# Patient Record
Sex: FEMALE | Race: WHITE | NOT HISPANIC OR LATINO | Employment: FULL TIME | URBAN - METROPOLITAN AREA
[De-identification: names, ages, dates, MRNs, and addresses within clinical notes are randomized per-mention and may not be internally consistent; named-entity substitution may affect disease eponyms.]

---

## 2017-11-01 ENCOUNTER — HOSPITAL ENCOUNTER (OUTPATIENT)
Facility: MEDICAL CENTER | Age: 27
End: 2017-11-01
Attending: NURSE PRACTITIONER
Payer: COMMERCIAL

## 2017-11-01 ENCOUNTER — OFFICE VISIT (OUTPATIENT)
Dept: MEDICAL GROUP | Facility: MEDICAL CENTER | Age: 27
End: 2017-11-01
Payer: COMMERCIAL

## 2017-11-01 VITALS
BODY MASS INDEX: 28.75 KG/M2 | TEMPERATURE: 98.9 F | WEIGHT: 183.2 LBS | HEART RATE: 73 BPM | DIASTOLIC BLOOD PRESSURE: 80 MMHG | SYSTOLIC BLOOD PRESSURE: 118 MMHG | OXYGEN SATURATION: 97 % | HEIGHT: 67 IN

## 2017-11-01 DIAGNOSIS — Z12.4 ROUTINE CERVICAL SMEAR: ICD-10-CM

## 2017-11-01 DIAGNOSIS — E55.9 VITAMIN D DEFICIENCY: ICD-10-CM

## 2017-11-01 DIAGNOSIS — N89.8 VAGINAL DISCHARGE: ICD-10-CM

## 2017-11-01 DIAGNOSIS — Z01.419 PAP SMEAR, LOW-RISK: ICD-10-CM

## 2017-11-01 DIAGNOSIS — F41.9 ANXIETY: ICD-10-CM

## 2017-11-01 DIAGNOSIS — K59.01 SLOW TRANSIT CONSTIPATION: ICD-10-CM

## 2017-11-01 PROCEDURE — 87660 TRICHOMONAS VAGIN DIR PROBE: CPT

## 2017-11-01 PROCEDURE — 99395 PREV VISIT EST AGE 18-39: CPT | Performed by: NURSE PRACTITIONER

## 2017-11-01 PROCEDURE — 87591 N.GONORRHOEAE DNA AMP PROB: CPT

## 2017-11-01 PROCEDURE — 88175 CYTOPATH C/V AUTO FLUID REDO: CPT

## 2017-11-01 PROCEDURE — 87480 CANDIDA DNA DIR PROBE: CPT

## 2017-11-01 PROCEDURE — 87491 CHLMYD TRACH DNA AMP PROBE: CPT

## 2017-11-01 PROCEDURE — 87510 GARDNER VAG DNA DIR PROBE: CPT

## 2017-11-01 NOTE — PROGRESS NOTES
Subjective:      Lee Ann Webber is a 27 y.o. female who presents with Gynecologic Exam            HPI  Seen in f/u for pap smear.  Feeling well.   She has a hx of low vitamin d.  Not on supplement.  She had a anxiety attack last week.  Occurs rarely.  Tried a med in the past and it didn't help.  Feels that she can control w/o meds.   She is working out more.   When last seen she was having rt side pain.  All w/u neg.  Resolved with treating her constipation.   She has freq yeast infections.  So does her mother.  No sx currently.     Patient Active Problem List    Diagnosis Date Noted   • Vitamin D deficiency      Current Outpatient Prescriptions   Medication Sig Dispense Refill   • Levonorgestrel (MIRENA IU) by Intrauterine route.       No current facility-administered medications for this visit.      Allergies   Allergen Reactions   • Nkda [No Known Drug Allergy]          ROS  Review of Systems   Constitutional: Negative.  Negative for fever, chills, weight loss, malaise/fatigue and diaphoresis.   HENT: Negative.  Negative for hearing loss, ear pain, nosebleeds, congestion, sore throat, neck pain, tinnitus and ear discharge.    Eyes: Negative.  Negative for blurred vision, double vision, photophobia, pain, discharge and redness.   Respiratory: Negative.  Negative for cough, hemoptysis, sputum production, shortness of breath, wheezing and stridor.    Cardiovascular: Negative.  Negative for chest pain, palpitations, orthopnea, claudication, leg swelling and PND.   Gastrointestinal: Negative.  Negative for heartburn, nausea, vomiting, abdominal pain, diarrhea, blood in stool and melena. + chronic constipation.    Genitourinary: Negative.  Negative for dysuria, urgency, frequency, incontinence, hematuria and flank pain.   Musculoskeletal: Negative.  Negative for myalgias, back pain, joint pain and falls.   Skin: Negative.  Negative for itching and rash.   Neurological: Negative.  Negative for dizziness, tingling,  "tremors, sensory change, speech change, focal weakness, seizures, loss of consciousness, weakness and headaches.   Endo/Heme/Allergies: Negative.  Negative for environmental allergies and polydipsia. Does not bruise/bleed easily.   Psychiatric/Behavioral: Negative.  Negative for depression, suicidal ideas, hallucinations, memory loss and substance abuse. The patient does not have insomnia.    All other systems reviewed and are negative.           Objective:     /80   Pulse 73   Temp 37.2 °C (98.9 °F)   Ht 1.702 m (5' 7\")   Wt 83.1 kg (183 lb 3.2 oz)   SpO2 97%   BMI 28.69 kg/m²      Physical Exam      Physical Exam   Vitals reviewed.  Constitutional: oriented to person, place, and time. appears well-developed and well-nourished. No distress.   HENT: Head: Normocephalic and atraumatic. Bilateral tympanic membranes wnl w/o bulging.  Right Ear: External ear normal. Left Ear: External ear normal. Nose: Nose normal.  Mouth/Throat: Oropharynx is clear and moist. No oropharyngeal exudate. grace tm wnl. Eyes: Conjunctivae and EOM are normal. Pupils are equal, round, and reactive to light. Right eye exhibits no discharge. Left eye exhibits no discharge. No scleral icterus.    Neck: Normal range of motion. Neck supple. No JVD present.   Cardiovascular: Normal rate, regular rhythm, normal heart sounds and intact distal pulses.  Exam reveals no gallop and no friction rub.  No murmur heard.  No carotid bruits   Pulmonary/Chest: Effort normal and breath sounds normal. No stridor. No respiratory distress. no wheezes or rales. exhibits no tenderness.   Abdominal: Soft. Bowel sounds are normal. exhibits no distension and no mass. No tenderness. no rebound and no guarding.   Musculoskeletal: Normal range of motion. exhibits no edema or tenderness.  grace pedal pulses 2+.  Lymphadenopathy:  no cervical or supraclavicular adenopathy.   Neurological: alert and oriented to person, place, and time. has normal reflexes. displays " normal reflexes. No cranial nerve deficit. exhibits normal muscle tone. Coordination normal.   Skin: Skin is warm and dry. No rash noted. no diaphoresis. No erythema. No pallor.   Psychiatric: normal mood and affect. behavior is normal.   SUBJECTIVE: 27 y.o. female for annual routine pap and checkup.  Gyn History:   Last Pap:   Contraception: mirena  H/O Abnormal Pap none  H/O STI none  Current partner: monogamous  Lmp: 17  ROS:  Menses rarely d/t mirena.  no excessive cramping or bleeding.  No analgesics required during menses.  No pelvic pain, vaginal discharge or dyspareunia.  No breast tenderness, mass, nipple discharge, changes in size or contour, or abnormal cyclic discomfort.   Breast Exam:Performed with instruction during examination. Breasts equal size and shape.  No axillary lymphadenopathy, no skin changes, no dominant masses. No nipple retraction  Pelvic Exam - Sure Path Pap and affirm obtained and specimen sent to lab. Normal external genitalia with no lesions. Normal vaginal mucosa with normal rugation. Cervix has no visible lesions. No cervical motion tenderness. Uterus is normal sized with no masses. No adnexal tenderness or enlargement appreciated.  Mod amt white cottage cheese dg noted                      Assessment/Plan:     1. Pap smear, low-risk  THINPREP RFLX HPV ASCUS W/CTNG    f/u with pt with test results and yearly.  planning on trying to get pg in .  will f/u with gyn for dc IUD and start prenatal mvi   2. Routine cervical smear  THINPREP RFLX HPV ASCUS W/CTNG   3. Vaginal discharge  VAGINAL PATHOGENS DNA PANEL    affirm done.  hx of recurrent yeast infection   4. Vitamin D deficiency      take d3 2000 units daily   5. Anxiety      occas sx.  no tx needed   6. Slow transit constipation      controlled with otc products     7.  F/u yearly

## 2017-11-02 ENCOUNTER — TELEPHONE (OUTPATIENT)
Dept: MEDICAL GROUP | Facility: MEDICAL CENTER | Age: 27
End: 2017-11-02

## 2017-11-02 DIAGNOSIS — B37.9 CANDIDA INFECTION: ICD-10-CM

## 2017-11-02 DIAGNOSIS — N76.0 GARDNERELLA VAGINALIS INFECTION: ICD-10-CM

## 2017-11-02 DIAGNOSIS — B96.89 GARDNERELLA VAGINALIS INFECTION: ICD-10-CM

## 2017-11-02 LAB
C TRACH DNA GENITAL QL NAA+PROBE: NEGATIVE
CANDIDA DNA VAG QL PROBE+SIG AMP: POSITIVE
CYTOLOGY REG CYTOL: NORMAL
G VAGINALIS DNA VAG QL PROBE+SIG AMP: POSITIVE
N GONORRHOEA DNA GENITAL QL NAA+PROBE: NEGATIVE
SPECIMEN SOURCE: NORMAL
T VAGINALIS DNA VAG QL PROBE+SIG AMP: NEGATIVE

## 2017-11-02 RX ORDER — METRONIDAZOLE 500 MG/1
500 TABLET ORAL 3 TIMES DAILY
Qty: 30 TAB | Refills: 0 | Status: SHIPPED | OUTPATIENT
Start: 2017-11-02 | End: 2018-01-17

## 2017-11-03 NOTE — TELEPHONE ENCOUNTER
Please let pt know that the affirm shows both yeast infection and gardnerella.  i will send in 1 week of flagyl.  That should treat both issues.

## 2017-11-06 ENCOUNTER — TELEPHONE (OUTPATIENT)
Dept: MEDICAL GROUP | Facility: MEDICAL CENTER | Age: 27
End: 2017-11-06

## 2017-11-06 DIAGNOSIS — R87.619 ABNORMAL CERVICAL PAPANICOLAOU SMEAR, UNSPECIFIED ABNORMAL PAP FINDING: ICD-10-CM

## 2017-11-06 NOTE — TELEPHONE ENCOUNTER
----- Message from Tyler Macario M.D. sent at 11/4/2017  2:21 PM PDT -----  Please notify meg's patient that her Pap smear does show some abnormal cells, I would recommend that she follow-up with a gynecologist.  Let us know if she needs a referral  She does also have evidence of a yeast infection and bacterial infection.  We will need to send antibiotics to her pharmacy.  If she is in agreement let me know

## 2018-01-17 ENCOUNTER — OFFICE VISIT (OUTPATIENT)
Dept: MEDICAL GROUP | Facility: CLINIC | Age: 28
End: 2018-01-17
Payer: COMMERCIAL

## 2018-01-17 VITALS
RESPIRATION RATE: 16 BRPM | SYSTOLIC BLOOD PRESSURE: 128 MMHG | HEART RATE: 62 BPM | TEMPERATURE: 97.9 F | DIASTOLIC BLOOD PRESSURE: 64 MMHG | OXYGEN SATURATION: 96 %

## 2018-01-17 DIAGNOSIS — J02.9 SORE THROAT: ICD-10-CM

## 2018-01-17 DIAGNOSIS — J40 BRONCHITIS: ICD-10-CM

## 2018-01-17 LAB
INT CON NEG: NEGATIVE
INT CON POS: POSITIVE
S PYO AG THROAT QL: NEGATIVE

## 2018-01-17 PROCEDURE — 87880 STREP A ASSAY W/OPTIC: CPT | Performed by: NURSE PRACTITIONER

## 2018-01-17 PROCEDURE — 99212 OFFICE O/P EST SF 10 MIN: CPT | Performed by: NURSE PRACTITIONER

## 2018-01-17 RX ORDER — CODEINE PHOSPHATE AND GUAIFENESIN 10; 100 MG/5ML; MG/5ML
5-10 SOLUTION ORAL NIGHTLY PRN
Qty: 100 ML | Refills: 0 | Status: SHIPPED | OUTPATIENT
Start: 2018-01-17 | End: 2018-01-27

## 2018-01-17 ASSESSMENT — ENCOUNTER SYMPTOMS
SINUS PAIN: 0
SORE THROAT: 1
FEVER: 0
SHORTNESS OF BREATH: 0
COUGH: 1
CHILLS: 0
SPUTUM PRODUCTION: 0
WHEEZING: 0
MYALGIAS: 0

## 2018-01-17 NOTE — PROGRESS NOTES
Chief Complaint   Patient presents with   • URI     dry cough/ congestion x 4 days        HISTORY OF PRESENT ILLNESS: Patient is a 27 y.o. female established patient who presents today due to 4 days hx of dry coughing but chest congested. Pt starts with sore throat and she feels that has resolved. However, she is taking OTC cold medicine and not sure if that has tylenol in there or not. Coughing is worsening at night time and she is unable to sleep.     Pt denied fever, chills, no trouble breathing, wheezing, no trouble swallowing. No earache or sinus pressure. No muscle soreness.    Non smoker. No known hx of lung disease or immuno compromised.    Patient Active Problem List    Diagnosis Date Noted   • Vitamin D deficiency        Allergies:Nkda [no known drug allergy]    Current Outpatient Prescriptions   Medication Sig Dispense Refill   • guaifenesin-codeine (ROBITUSSIN AC) Solution oral solution Take 5-10 mL by mouth at bedtime as needed for up to 10 days. 100 mL 0   • Levonorgestrel (MIRENA IU) by Intrauterine route.       No current facility-administered medications for this visit.        Social History   Substance Use Topics   • Smoking status: Never Smoker   • Smokeless tobacco: Never Used   • Alcohol use 0.6 oz/week     1 Glasses of wine per week       Family Status   Relation Status   • Mother Alive   • Father Alive   • Maternal Grandmother Alive   • Maternal Grandfather Alive   • Paternal Grandmother    • Paternal Grandfather      Family History   Problem Relation Age of Onset   • Thyroid Mother    • Cancer Father      skin   • Cancer Maternal Grandmother      breast   • Heart Disease Maternal Grandfather 70     mi   • Cancer Paternal Grandmother      lung in smoker         ROS:  Review of Systems   Constitutional: Negative for chills and fever.   HENT: Positive for congestion and sore throat (mild). Negative for sinus pain.    Respiratory: Positive for cough. Negative for sputum  production, shortness of breath and wheezing.    Musculoskeletal: Negative for myalgias.        Objective:     Blood pressure 128/64, pulse 62, temperature 36.6 °C (97.9 °F), resp. rate 16, SpO2 96 %.     Physical Exam:  Physical Exam   Constitutional: She is oriented to person, place, and time and well-developed, well-nourished, and in no distress.   HENT:   Head: Normocephalic and atraumatic.   Mouth/Throat: Posterior oropharyngeal edema (tonsil is a little bit swelling and red but no drainage or white patches) and posterior oropharyngeal erythema present. No oropharyngeal exudate or tonsillar abscesses.   Eyes: Conjunctivae are normal.   Neck: Neck supple. No JVD present.   Cardiovascular: Normal rate and regular rhythm.    Pulmonary/Chest: Effort normal and breath sounds normal. No respiratory distress. She has no wheezes. She has no rales.   Musculoskeletal: Normal range of motion. She exhibits no edema.   Neurological: She is alert and oriented to person, place, and time.   Skin: Skin is warm. No erythema.   Vitals reviewed.        Assessment/Plan:  Less likely strep throat but cannot totally exclude out without doing strep test since pt might have two things/infections going on at the same time.     1. Sore throat  - POCT Rapid Strep A: negative    2. Bronchitis  Most likely virus, supportive care for now. Pt to call us back on Friday if she is still not feeling better. Will consider to cover with abx.   - guaifenesin-codeine (ROBITUSSIN AC) Solution oral solution; Take 5-10 mL by mouth at bedtime as needed for up to 10 days.  Dispense: 100 mL; Refill: 0    Differential diagnoses and indications for immediate follow-up discussed with patient.    Instructed to return to clinic or nearest emergency department for any change in condition, further concerns, or worsening of symptoms.    The patient demonstrated a good understanding and agreed with the treatment plan.

## 2018-01-18 NOTE — PATIENT INSTRUCTIONS

## 2018-01-19 ENCOUNTER — TELEPHONE (OUTPATIENT)
Dept: MEDICAL GROUP | Facility: CLINIC | Age: 28
End: 2018-01-19

## 2018-01-19 DIAGNOSIS — J40 BRONCHITIS: ICD-10-CM

## 2018-01-19 RX ORDER — AZITHROMYCIN 250 MG/1
TABLET, FILM COATED ORAL
Qty: 6 TAB | Refills: 0 | Status: SHIPPED | OUTPATIENT
Start: 2018-01-19 | End: 2019-08-21

## 2018-01-19 NOTE — TELEPHONE ENCOUNTER
1. Caller Name: Lee Ann Webber                      Call Back Number: [phone] (home)     2. Message: pt called she states not getting better wants to get antibiotics, please send to [facility] on [address] thank you     3. Patient approves office to leave a detailed MyChart message: yes

## 2018-02-04 ENCOUNTER — TELEPHONE (OUTPATIENT)
Dept: MEDICAL GROUP | Facility: MEDICAL CENTER | Age: 28
End: 2018-02-04

## 2018-02-05 NOTE — TELEPHONE ENCOUNTER
Gave pt info. Pt states she has not made apt with gyn. Pt thinks she was only referred due to an infection that she states is under control.

## 2018-02-05 NOTE — TELEPHONE ENCOUNTER
She did have an infection so that is ok however the pap smear was very abn.  i am  Not sure that it was d/t the infection.  She needs to see gyn.

## 2018-04-04 ENCOUNTER — GYNECOLOGY VISIT (OUTPATIENT)
Dept: OBGYN | Facility: MEDICAL CENTER | Age: 28
End: 2018-04-04
Payer: COMMERCIAL

## 2018-04-04 VITALS
WEIGHT: 180 LBS | HEIGHT: 67 IN | BODY MASS INDEX: 28.25 KG/M2 | SYSTOLIC BLOOD PRESSURE: 120 MMHG | DIASTOLIC BLOOD PRESSURE: 80 MMHG

## 2018-04-04 DIAGNOSIS — R87.613 HGSIL ON PAP SMEAR OF CERVIX: ICD-10-CM

## 2018-04-04 DIAGNOSIS — L70.9 ACNE, UNSPECIFIED ACNE TYPE: ICD-10-CM

## 2018-04-04 DIAGNOSIS — E28.8 HYPERANDROGENISM: ICD-10-CM

## 2018-04-04 PROCEDURE — 99203 OFFICE O/P NEW LOW 30 MIN: CPT | Performed by: OBSTETRICS & GYNECOLOGY

## 2018-04-05 ENCOUNTER — TELEPHONE (OUTPATIENT)
Dept: OBGYN | Facility: MEDICAL CENTER | Age: 28
End: 2018-04-05

## 2018-04-05 DIAGNOSIS — R87.613 HGSIL (HIGH GRADE SQUAMOUS INTRAEPITHELIAL LESION) ON PAP SMEAR OF CERVIX: ICD-10-CM

## 2018-04-05 NOTE — PROGRESS NOTES
"Subjective:      Lee Ann Webber is a 28 y.o. female who presents with New Patient (Abnormal pap/HGSIL REF BY PCP)        CC: abnormal pap smear    HPI: 29 yo  using Mirena IUD for contraception presents for consultation regarding abnormal pap smear.  Pap returned HGSIL on .  No hx of previous cervical dysplasia, only \"infections on pap smear.  Using Mirena for contraception, no menses.  Thinking about trying to conceive by IVF, because she want to choose the gender.  She has lost 50 lbs by working out twice a day.  She complains of severe cystic acne which started 6 months ago.  Denies abnormal hair growth.      ROS      Objective:     /80   Ht 1.702 m (5' 7\")   Wt 81.6 kg (180 lb)   Breastfeeding? No   BMI 28.19 kg/m²      Physical Exam  GENERAL: Alert, in no apparent distress  PSYCHIATRIC: Appropriate affect, intact insight and judgement.  NECK:  Nontender, no masses.  No Thyromegaly or nodules. No lymphadenopathy.  RESPIRATORY: Normal respiratory effort.  Lungs clear to auscultation.   CARDIOVASCULAR: RRR, no murmur, gallop, or rub.  ABDOMEN: Soft, nontender, nondistended.  No palpable masses.  No rebound or guarding.  No inguinal lymphadenopathy.  No hepatosplenomegaly.  No hernias.  BACK: No CVA tenderness  EXTREMITIES: No edema  SKIN: No rash. Severe cystic acne on face.       Pap smear  - HGSIL     Assessment/Plan:     1. HGSIL on Pap smear of cervix  Discussed need for colposcopy to r/o cervical cancer, and natural progression of dysplasia to cervical cancer.  She will likely need treatment with LEEP procedure. Referral placed for colposcopy.    2. Acne, unspecified acne type  Offered trial of benzoyl peroxide or cleocin solution.  She declines, requests hormones to be checked.   - TESTOSTERONE F&T FEMALES/CHILD; Future  - DHEA SULFATE; Future  - DHEA; Future  - FSH; Future  - LUTEINIZING HORMONE SERUM (LH); Future    3. Hyperandrogenism  Severe acne, but no hirusuitism. No signs " of insulin resistance with recent weight loss.   - TESTOSTERONE F&T FEMALES/CHILD; Future  - DHEA SULFATE; Future  - DHEA; Future  - FSH; Future  - LUTEINIZING HORMONE SERUM (LH); Future    4. Preconception counseling - recommend folic acid supplementation and removal of IUD one month before starting IVF process.    F/U for colposcopy next available

## 2018-04-05 NOTE — TELEPHONE ENCOUNTER
----- Message from Keyshawn Mukherjee sent at 4/5/2018  9:26 AM PDT -----  Regarding: Referral Request  Contact: [phone]  Pt saw Dr. Schaeffer yesterday. Requested a referral to Dr. Maria.    Called pt back, she would like a referral to Dr Maria's office for a second option in regards to her Colposcopy/LEEP.  Pt states that she already has an apt scheduled but she does need the referral to be placed. Informed pt that referral will be placed today.     Referral placed today in [facility].     Informed Dr Schaeffer about pt request, she does not feel that referral need to be placed. But since pt is requesting referral order has been placed.

## 2018-04-12 ENCOUNTER — HOSPITAL ENCOUNTER (OUTPATIENT)
Dept: LAB | Facility: MEDICAL CENTER | Age: 28
End: 2018-04-12
Attending: OBSTETRICS & GYNECOLOGY
Payer: COMMERCIAL

## 2018-04-12 DIAGNOSIS — E28.8 HYPERANDROGENISM: ICD-10-CM

## 2018-04-12 DIAGNOSIS — L70.9 ACNE, UNSPECIFIED ACNE TYPE: ICD-10-CM

## 2018-04-12 LAB
DHEA-S SERPL-MCNC: 195 UG/DL (ref 98.8–340)
FSH SERPL-ACNC: 2.2 MIU/ML
LH SERPL-ACNC: 7 IU/L

## 2018-04-12 PROCEDURE — 84403 ASSAY OF TOTAL TESTOSTERONE: CPT

## 2018-04-12 PROCEDURE — 36415 COLL VENOUS BLD VENIPUNCTURE: CPT

## 2018-04-12 PROCEDURE — 83002 ASSAY OF GONADOTROPIN (LH): CPT

## 2018-04-12 PROCEDURE — 82626 DEHYDROEPIANDROSTERONE: CPT

## 2018-04-12 PROCEDURE — 83001 ASSAY OF GONADOTROPIN (FSH): CPT

## 2018-04-12 PROCEDURE — 84270 ASSAY OF SEX HORMONE GLOBUL: CPT

## 2018-04-12 PROCEDURE — 82627 DEHYDROEPIANDROSTERONE: CPT

## 2018-04-15 LAB
DHEA SERPL-MCNC: 4.55 NG/ML (ref 1.33–7.78)
SHBG SERPL-SCNC: 42 NMOL/L (ref 30–135)
TESTOST FREE SERPL-MCNC: 4.2 PG/ML (ref 0.8–7.4)
TESTOST SERPL-MCNC: 29 NG/DL (ref 9–55)

## 2018-05-15 ENCOUNTER — OFFICE VISIT (OUTPATIENT)
Dept: URGENT CARE | Facility: CLINIC | Age: 28
End: 2018-05-15
Payer: COMMERCIAL

## 2018-05-15 VITALS
HEART RATE: 94 BPM | RESPIRATION RATE: 16 BRPM | OXYGEN SATURATION: 99 % | HEIGHT: 67 IN | DIASTOLIC BLOOD PRESSURE: 62 MMHG | BODY MASS INDEX: 28.3 KG/M2 | TEMPERATURE: 98.7 F | WEIGHT: 180.34 LBS | SYSTOLIC BLOOD PRESSURE: 128 MMHG

## 2018-05-15 DIAGNOSIS — R10.32 LLQ PAIN: ICD-10-CM

## 2018-05-15 LAB
APPEARANCE UR: NORMAL
BILIRUB UR STRIP-MCNC: NEGATIVE MG/DL
COLOR UR AUTO: YELLOW
GLUCOSE UR STRIP.AUTO-MCNC: NEGATIVE MG/DL
INT CON NEG: NORMAL
INT CON POS: NORMAL
KETONES UR STRIP.AUTO-MCNC: NEGATIVE MG/DL
LEUKOCYTE ESTERASE UR QL STRIP.AUTO: NORMAL
NITRITE UR QL STRIP.AUTO: NEGATIVE
PH UR STRIP.AUTO: 7.5 [PH] (ref 5–8)
POC URINE PREGNANCY TEST: NEGATIVE
PROT UR QL STRIP: 30 MG/DL
RBC UR QL AUTO: NORMAL
SP GR UR STRIP.AUTO: 1.01
UROBILINOGEN UR STRIP-MCNC: NEGATIVE MG/DL

## 2018-05-15 PROCEDURE — 81002 URINALYSIS NONAUTO W/O SCOPE: CPT | Performed by: FAMILY MEDICINE

## 2018-05-15 PROCEDURE — 81025 URINE PREGNANCY TEST: CPT | Performed by: FAMILY MEDICINE

## 2018-05-15 PROCEDURE — 99214 OFFICE O/P EST MOD 30 MIN: CPT | Performed by: FAMILY MEDICINE

## 2018-05-15 RX ORDER — IBUPROFEN 800 MG/1
800 TABLET ORAL EVERY 8 HOURS PRN
Qty: 30 TAB | Refills: 0 | Status: SHIPPED | OUTPATIENT
Start: 2018-05-15 | End: 2019-08-21

## 2018-05-15 NOTE — PROGRESS NOTES
Subjective:     Chief Complaint   Patient presents with   • LLQ Pain     2 years on and off                 Abdominal Pain  This is a new problem. The current episode started 2 YRS AGO.   She has been evaluated extensively for this in the past by multiple specialists - Gyn, GI and she stopped seeing them last year since all testing was negative and she was fustrated that no underlying cause was found.    Pain significantly worse today - sharp, stabbing, RLQ pain.   Nothing seems to make the pain better or worse.   She has hx of  chronic constipation, but normal BMs lately.         Pertinent negatives include no belching, constipation, diarrhea, dysuria, fever, hematochezia, hematuria, nausea or sore throat.          Social History   Substance Use Topics   • Smoking status: Never Smoker   • Smokeless tobacco: Never Used   • Alcohol use 0.6 oz/week     1 Glasses of wine per week           Current Outpatient Prescriptions on File Prior to Visit   Medication Sig Dispense Refill   • Levonorgestrel (MIRENA IU) by Intrauterine route.     • azithromycin (ZITHROMAX) 250 MG Tab As directed 6 Tab 0     No current facility-administered medications on file prior to visit.        Family History   Problem Relation Age of Onset   • Thyroid Mother    • Cancer Father      skin   • Cancer Maternal Grandmother      breast   • Heart Disease Maternal Grandfather 70     mi   • Cancer Paternal Grandmother      lung in smoker         Allergies   Allergen Reactions   • Nkda [No Known Drug Allergy]          Review of Systems   Constitutional: Negative for fever.   HENT: Negative for sore throat.    Gastrointestinal: Positive for abdominal pain. Negative for nausea, diarrhea, constipation and hematochezia.   Genitourinary: Negative for dysuria and hematuria.   Neurological: denies dizziness, confusion, disorientation.   No extremity weakness or numbness  All other systems reviewed and are negative.         Objective:     Blood pressure 128/62,  "pulse 94, temperature 37.1 °C (98.7 °F), resp. rate 16, height 1.702 m (5' 7\"), weight 81.8 kg (180 lb 5.4 oz), SpO2 99 %, not currently breastfeeding.      Physical Exam   Constitutional: pt appears well-developed. No distress.   HENT:   Nose: No nasal discharge.   Mouth/Throat: Mucous membranes are moist. Oropharynx is clear.   Eyes: Conjunctivae and EOM are normal. Pupils are equal, round, and reactive to light. Right eye exhibits no discharge. Left eye exhibits no discharge.   Neck: Neck supple.   Cardiovascular: Normal rate, regular rhythm, S1 normal and S2 normal.    Pulmonary/Chest: Effort normal and breath sounds normal. There is normal air entry. No respiratory distress.   Abdominal: Soft. There is tenderness in the RLQ area. bowel sounds are present.   No liver or spleen enlargement .  No rebound and no guarding.   There is no pain over McBurney's point  Lymphadenopathy:     Pt has no  adenopathy.   Neurological: pt is alert and orientated x3 . No cranial nerve deficit.   Skin: Skin is warm and moist. No petechiae and no rash noted.   not diaphoretic. No jaundice.   Nursing note and vitals reviewed.         Lab Results   Component Value Date/Time    POCCOLOR yellow 05/15/2018 04:40 PM    POCAPPEAR cloudy 05/15/2018 04:40 PM    POCLEUKEST moderate 05/15/2018 04:40 PM    POCNITRITE negative 05/15/2018 04:40 PM    POCUROBILIGE negative 05/15/2018 04:40 PM    POCPROTEIN 30 05/15/2018 04:40 PM    POCURPH 7.5 05/15/2018 04:40 PM    POCBLOOD trace 05/15/2018 04:40 PM    POCSPGRV 1.010 05/15/2018 04:40 PM    POCKETONES negative 05/15/2018 04:40 PM    POCBILIRUBIN negative 05/15/2018 04:40 PM    POCGLUCUA negative 05/15/2018 04:40 PM      Urine HCG negative.      Assessment/Plan:          1. LLQ pain  UA was significant for LE, trace blood - will send for culture, but given duration of her pain, and lack of urinary symptoms, I don't think that this represents UTI, but will send for cx      CT abd/pelvis ordered. "         - ibuprofen (MOTRIN) 800 MG Tab; Take 1 Tab by mouth every 8 hours as needed.  Dispense: 30 Tab; Refill: 0  - CT-ABDOMEN-PELVIS WITH; Future  - POCT Pregnancy  - POCT Urinalysis

## 2019-08-21 ENCOUNTER — OFFICE VISIT (OUTPATIENT)
Dept: MEDICAL GROUP | Facility: MEDICAL CENTER | Age: 29
End: 2019-08-21
Payer: COMMERCIAL

## 2019-08-21 VITALS
HEART RATE: 89 BPM | SYSTOLIC BLOOD PRESSURE: 110 MMHG | BODY MASS INDEX: 29.19 KG/M2 | TEMPERATURE: 98 F | HEIGHT: 67 IN | DIASTOLIC BLOOD PRESSURE: 80 MMHG | OXYGEN SATURATION: 99 % | WEIGHT: 186 LBS

## 2019-08-21 DIAGNOSIS — R19.4 CHANGE IN BOWEL HABIT: ICD-10-CM

## 2019-08-21 DIAGNOSIS — T78.40XA ALLERGIC REACTION, INITIAL ENCOUNTER: ICD-10-CM

## 2019-08-21 DIAGNOSIS — R43.2 ALTERED TASTE: ICD-10-CM

## 2019-08-21 DIAGNOSIS — R19.4 BOWEL HABIT CHANGES: ICD-10-CM

## 2019-08-21 DIAGNOSIS — Z91.018 FOOD ALLERGY: ICD-10-CM

## 2019-08-21 DIAGNOSIS — K92.1 HEMATOCHEZIA: ICD-10-CM

## 2019-08-21 DIAGNOSIS — Z13.220 SCREENING FOR HYPERLIPIDEMIA: ICD-10-CM

## 2019-08-21 DIAGNOSIS — R63.5 WEIGHT GAIN: ICD-10-CM

## 2019-08-21 DIAGNOSIS — E55.9 VITAMIN D DEFICIENCY: ICD-10-CM

## 2019-08-21 PROCEDURE — 99214 OFFICE O/P EST MOD 30 MIN: CPT | Performed by: NURSE PRACTITIONER

## 2019-08-21 RX ORDER — EPINEPHRINE 0.3 MG/.3ML
0.3 INJECTION SUBCUTANEOUS ONCE
Qty: 0.3 ML | Refills: 0 | Status: SHIPPED | OUTPATIENT
Start: 2019-08-21 | End: 2019-08-21

## 2019-08-21 ASSESSMENT — PATIENT HEALTH QUESTIONNAIRE - PHQ9: CLINICAL INTERPRETATION OF PHQ2 SCORE: 0

## 2019-08-21 NOTE — PROGRESS NOTES
Subjective:     Lee Ann Webber is a 29 y.o. female who presents with change in taste.    HPI:   Seen in f/u for change in taste buds.  She doesn't eat meat.  Over the last several weeks she has noted nausea and diarrhea with chicken and fish both.  She will immediately get sick.  In the past red meat has always made her sick.  She will get angioedema wiht eating onions.  It is getting worse.  Sudden onset of taste change.  She will having bloating with eating na.  When she eats sugar she will have heart racing.  No FH of DM.    She has chronic diarrhea on mg++. i f she doesn't take mg+ she will have constipation.  She is also having occas black tarry stools.  No FH of crohns and ulcerative.  Continues to have LLQ pain with constipation.  No colonoscopy done.    She has been exercising regularly.  She is eating very healthy.  Still can't loose weight.  Her last lab showed sl low vitamin d.  She is on d otc daily.       Patient Active Problem List    Diagnosis Date Noted   • Altered taste 08/21/2019   • Weight gain finding 08/21/2019   • Food allergy 08/21/2019   • Bowel habit changes 08/21/2019   • Hematochezia 08/21/2019   • Vitamin D deficiency        Current medicines (including changes today)  Current Outpatient Medications   Medication Sig Dispense Refill   • EPINEPHrine (EPIPEN 2-AMADOR) 0.3 MG/0.3ML Solution Auto-injector solution for injection 0.3 mL by Intramuscular route Once for 1 dose. 0.3 mL 0   • Levonorgestrel (MIRENA IU) by Intrauterine route.       No current facility-administered medications for this visit.        Allergies   Allergen Reactions   • Nkda [No Known Drug Allergy]        ROS  Constitutional: Negative. Negative for fever, chills, weight loss, malaise/fatigue and diaphoresis.   HENT: Negative. Negative for hearing loss, ear pain, nosebleeds, congestion, sore throat, neck pain, tinnitus and ear discharge.   Respiratory: Negative. Negative for cough, hemoptysis, sputum production, shortness  "of breath, wheezing and stridor.   Cardiovascular: Negative. Negative for chest pain, palpitations, orthopnea, claudication, leg swelling and PND.   Gastrointestinal: Denies nausea, vomiting, diarrhea, constipation, heartburn, melena or hematochezia.  Genitourinary: Denies dysuria, hematuria, urinary incontinence, frequency or urgency.        Objective:     /80 (BP Location: Right arm, Patient Position: Sitting)   Pulse 89   Temp 36.7 °C (98 °F) (Temporal)   Ht 1.702 m (5' 7\")   Wt 84.4 kg (186 lb)   SpO2 99%  Body mass index is 29.13 kg/m².    Physical Exam:  Vitals reviewed.  Constitutional: Oriented to person, place, and time. appears well-developed and well-nourished. No distress.   Cardiovascular: Normal rate, regular rhythm, normal heart sounds and intact distal pulses. Exam reveals no gallop and no friction rub. No murmur heard. No carotid bruits.   Pulmonary/Chest: Effort normal and breath sounds normal. No stridor. No respiratory distress. no wheezes or rales. exhibits no tenderness.   Musculoskeletal: Normal range of motion. exhibits no edema. garce pedal pulses 2+.  Lymphadenopathy: No cervical or supraclavicular adenopathy.   Neurological: Alert and oriented to person, place, and time. exhibits normal muscle tone.  Skin: Skin is warm and dry. No diaphoresis.   Psychiatric: Normal mood and affect. Behavior is normal.      Assessment and Plan:     The following treatment plan was discussed:    1. Change in bowel habit  CBC WITH DIFFERENTIAL    Comp Metabolic Panel    HEMOGLOBIN A1C    REFERRAL TO GASTROENTEROLOGY    new onset with change in taste.  refer gi and allergy for angioedema sx.     2. Allergic reaction, initial encounter  EPINEPHrine (EPIPEN 2-AMADOR) 0.3 MG/0.3ML Solution Auto-injector solution for injection    REFERRAL TO ALLERGY   3. Weight gain  Comp Metabolic Panel    HEMOGLOBIN A1C    TSH    FREE THYROXINE    Eating healthy but cannot loose wt.  Check thyroid lab.    4. Vitamin D " deficiency  VITAMIN D,25 HYDROXY    Taking otc supplement.  Last d was mildly dec.  Check lab and f/u for review 1 mo     5. Screening for hyperlipidemia  Lipid Profile   6. Altered taste  CBC WITH DIFFERENTIAL    Comp Metabolic Panel    HEMOGLOBIN A1C    VITAMIN B12    FOLATE    WESTERGREN SED RATE    HELENA REFLEXIVE PROFILE    New with sudden onset.  Angioedema sx after eating onions.  Refer allergy.  Do lab and f/u 1 mo for review     7. Food allergy      Angioedema sx.  Refer allergy.  Epipen for pt use if sx reoccur   8. Bowel habit changes      Refer GI.  Having n/v with foods.  Black tarry stools.  Diarrhea and constipation   9. Hematochezia      Having diarrhea and constipation.  Also bloating after eating na.  Some black tarry stools.   refer GI for eval         Followup: Return in about 4 weeks (around 9/18/2019).

## 2019-09-06 ENCOUNTER — HOSPITAL ENCOUNTER (OUTPATIENT)
Dept: LAB | Facility: MEDICAL CENTER | Age: 29
End: 2019-09-06
Attending: NURSE PRACTITIONER
Payer: COMMERCIAL

## 2019-09-06 DIAGNOSIS — R63.5 WEIGHT GAIN: ICD-10-CM

## 2019-09-06 DIAGNOSIS — R43.2 ALTERED TASTE: ICD-10-CM

## 2019-09-06 DIAGNOSIS — Z13.220 SCREENING FOR HYPERLIPIDEMIA: ICD-10-CM

## 2019-09-06 DIAGNOSIS — R19.4 CHANGE IN BOWEL HABIT: ICD-10-CM

## 2019-09-06 DIAGNOSIS — E55.9 VITAMIN D DEFICIENCY: ICD-10-CM

## 2019-09-06 LAB
25(OH)D3 SERPL-MCNC: 46 NG/ML (ref 30–100)
ALBUMIN SERPL BCP-MCNC: 4.3 G/DL (ref 3.2–4.9)
ALBUMIN/GLOB SERPL: 1.7 G/DL
ALP SERPL-CCNC: 36 U/L (ref 30–99)
ALT SERPL-CCNC: 20 U/L (ref 2–50)
ANION GAP SERPL CALC-SCNC: 10 MMOL/L (ref 0–11.9)
AST SERPL-CCNC: 24 U/L (ref 12–45)
BASOPHILS # BLD AUTO: 0.9 % (ref 0–1.8)
BASOPHILS # BLD: 0.05 K/UL (ref 0–0.12)
BILIRUB SERPL-MCNC: 0.5 MG/DL (ref 0.1–1.5)
BUN SERPL-MCNC: 12 MG/DL (ref 8–22)
CALCIUM SERPL-MCNC: 9.7 MG/DL (ref 8.5–10.5)
CHLORIDE SERPL-SCNC: 104 MMOL/L (ref 96–112)
CHOLEST SERPL-MCNC: 170 MG/DL (ref 100–199)
CO2 SERPL-SCNC: 24 MMOL/L (ref 20–33)
CREAT SERPL-MCNC: 0.75 MG/DL (ref 0.5–1.4)
EOSINOPHIL # BLD AUTO: 0.1 K/UL (ref 0–0.51)
EOSINOPHIL NFR BLD: 1.8 % (ref 0–6.9)
ERYTHROCYTE [DISTWIDTH] IN BLOOD BY AUTOMATED COUNT: 44.6 FL (ref 35.9–50)
ERYTHROCYTE [SEDIMENTATION RATE] IN BLOOD BY WESTERGREN METHOD: 3 MM/HOUR (ref 0–20)
FOLATE SERPL-MCNC: 15.2 NG/ML
GLOBULIN SER CALC-MCNC: 2.6 G/DL (ref 1.9–3.5)
GLUCOSE SERPL-MCNC: 82 MG/DL (ref 65–99)
HCT VFR BLD AUTO: 43.3 % (ref 37–47)
HDLC SERPL-MCNC: 51 MG/DL
HGB BLD-MCNC: 13.9 G/DL (ref 12–16)
IMM GRANULOCYTES # BLD AUTO: 0.02 K/UL (ref 0–0.11)
IMM GRANULOCYTES NFR BLD AUTO: 0.4 % (ref 0–0.9)
LDLC SERPL CALC-MCNC: 106 MG/DL
LYMPHOCYTES # BLD AUTO: 2.16 K/UL (ref 1–4.8)
LYMPHOCYTES NFR BLD: 38.9 % (ref 22–41)
MCH RBC QN AUTO: 28.5 PG (ref 27–33)
MCHC RBC AUTO-ENTMCNC: 32.1 G/DL (ref 33.6–35)
MCV RBC AUTO: 88.7 FL (ref 81.4–97.8)
MONOCYTES # BLD AUTO: 0.43 K/UL (ref 0–0.85)
MONOCYTES NFR BLD AUTO: 7.7 % (ref 0–13.4)
NEUTROPHILS # BLD AUTO: 2.79 K/UL (ref 2–7.15)
NEUTROPHILS NFR BLD: 50.3 % (ref 44–72)
NRBC # BLD AUTO: 0 K/UL
NRBC BLD-RTO: 0 /100 WBC
PLATELET # BLD AUTO: 233 K/UL (ref 164–446)
PMV BLD AUTO: 10 FL (ref 9–12.9)
POTASSIUM SERPL-SCNC: 4.2 MMOL/L (ref 3.6–5.5)
PROT SERPL-MCNC: 6.9 G/DL (ref 6–8.2)
RBC # BLD AUTO: 4.88 M/UL (ref 4.2–5.4)
SODIUM SERPL-SCNC: 138 MMOL/L (ref 135–145)
T4 FREE SERPL-MCNC: 0.87 NG/DL (ref 0.53–1.43)
TRIGL SERPL-MCNC: 63 MG/DL (ref 0–149)
TSH SERPL DL<=0.005 MIU/L-ACNC: 2.11 UIU/ML (ref 0.38–5.33)
VIT B12 SERPL-MCNC: 866 PG/ML (ref 211–911)
WBC # BLD AUTO: 5.6 K/UL (ref 4.8–10.8)

## 2019-09-06 PROCEDURE — 82306 VITAMIN D 25 HYDROXY: CPT

## 2019-09-06 PROCEDURE — 85025 COMPLETE CBC W/AUTO DIFF WBC: CPT

## 2019-09-06 PROCEDURE — 84439 ASSAY OF FREE THYROXINE: CPT

## 2019-09-06 PROCEDURE — 84443 ASSAY THYROID STIM HORMONE: CPT

## 2019-09-06 PROCEDURE — 82746 ASSAY OF FOLIC ACID SERUM: CPT

## 2019-09-06 PROCEDURE — 80053 COMPREHEN METABOLIC PANEL: CPT

## 2019-09-06 PROCEDURE — 82607 VITAMIN B-12: CPT

## 2019-09-06 PROCEDURE — 85652 RBC SED RATE AUTOMATED: CPT

## 2019-09-06 PROCEDURE — 83036 HEMOGLOBIN GLYCOSYLATED A1C: CPT

## 2019-09-06 PROCEDURE — 80061 LIPID PANEL: CPT

## 2019-09-06 PROCEDURE — 36415 COLL VENOUS BLD VENIPUNCTURE: CPT

## 2019-09-06 PROCEDURE — 86039 ANTINUCLEAR ANTIBODIES (ANA): CPT

## 2019-09-06 PROCEDURE — 86038 ANTINUCLEAR ANTIBODIES: CPT

## 2019-09-07 LAB
EST. AVERAGE GLUCOSE BLD GHB EST-MCNC: 100 MG/DL
HBA1C MFR BLD: 5.1 % (ref 0–5.6)
NUCLEAR IGG SER QL IA: DETECTED

## 2019-09-08 LAB
ANA INTERPRETIVE COMMENT Q5143: NORMAL
ANTINUCLEAR ANTIBODY (ANA), HEP-2, IGG Q5142: NORMAL

## 2019-09-18 ENCOUNTER — OFFICE VISIT (OUTPATIENT)
Dept: MEDICAL GROUP | Facility: MEDICAL CENTER | Age: 29
End: 2019-09-18
Payer: COMMERCIAL

## 2019-09-18 VITALS
HEIGHT: 67 IN | TEMPERATURE: 97.4 F | HEART RATE: 67 BPM | OXYGEN SATURATION: 99 % | BODY MASS INDEX: 29.19 KG/M2 | WEIGHT: 186 LBS | SYSTOLIC BLOOD PRESSURE: 102 MMHG | DIASTOLIC BLOOD PRESSURE: 62 MMHG

## 2019-09-18 DIAGNOSIS — K58.8 OTHER IRRITABLE BOWEL SYNDROME: ICD-10-CM

## 2019-09-18 DIAGNOSIS — M54.40 CHRONIC BILATERAL LOW BACK PAIN WITH SCIATICA, SCIATICA LATERALITY UNSPECIFIED: ICD-10-CM

## 2019-09-18 DIAGNOSIS — R76.8 POSITIVE ANA (ANTINUCLEAR ANTIBODY): ICD-10-CM

## 2019-09-18 DIAGNOSIS — G89.29 CHRONIC BILATERAL LOW BACK PAIN WITH SCIATICA, SCIATICA LATERALITY UNSPECIFIED: ICD-10-CM

## 2019-09-18 PROCEDURE — 99214 OFFICE O/P EST MOD 30 MIN: CPT | Performed by: NURSE PRACTITIONER

## 2019-09-18 RX ORDER — IBUPROFEN 800 MG/1
800 TABLET ORAL EVERY 8 HOURS PRN
Qty: 20 TAB | Refills: 3 | Status: SHIPPED | OUTPATIENT
Start: 2019-09-18 | End: 2020-06-11

## 2019-10-22 ENCOUNTER — HOSPITAL ENCOUNTER (OUTPATIENT)
Dept: LAB | Facility: MEDICAL CENTER | Age: 29
End: 2019-10-22
Attending: NURSE PRACTITIONER
Payer: COMMERCIAL

## 2019-10-22 DIAGNOSIS — R76.8 POSITIVE ANA (ANTINUCLEAR ANTIBODY): ICD-10-CM

## 2019-10-22 LAB — RHEUMATOID FACT SER IA-ACNC: <10 IU/ML (ref 0–14)

## 2019-10-22 PROCEDURE — 86431 RHEUMATOID FACTOR QUANT: CPT

## 2019-10-22 PROCEDURE — 36415 COLL VENOUS BLD VENIPUNCTURE: CPT

## 2019-10-22 PROCEDURE — 86200 CCP ANTIBODY: CPT

## 2019-10-24 ENCOUNTER — TELEPHONE (OUTPATIENT)
Dept: MEDICAL GROUP | Facility: MEDICAL CENTER | Age: 29
End: 2019-10-24

## 2019-10-24 LAB — CCP IGG SERPL-ACNC: 5 UNITS (ref 0–19)

## 2020-03-11 ENCOUNTER — HOSPITAL ENCOUNTER (OUTPATIENT)
Dept: LAB | Facility: MEDICAL CENTER | Age: 30
End: 2020-03-11
Attending: OBSTETRICS & GYNECOLOGY
Payer: COMMERCIAL

## 2020-03-11 LAB
HBV SURFACE AB SERPL IA-ACNC: 352 MIU/ML (ref 0–10)
HBV SURFACE AG SER QL: NORMAL
HCV AB SER QL: NORMAL
HIV 1+2 AB+HIV1 P24 AG SERPL QL IA: NORMAL
TREPONEMA PALLIDUM IGG+IGM AB [PRESENCE] IN SERUM OR PLASMA BY IMMUNOASSAY: NORMAL

## 2020-03-11 PROCEDURE — 86780 TREPONEMA PALLIDUM: CPT

## 2020-03-11 PROCEDURE — 87389 HIV-1 AG W/HIV-1&-2 AB AG IA: CPT

## 2020-03-11 PROCEDURE — 86803 HEPATITIS C AB TEST: CPT

## 2020-03-11 PROCEDURE — 87340 HEPATITIS B SURFACE AG IA: CPT

## 2020-03-11 PROCEDURE — 86706 HEP B SURFACE ANTIBODY: CPT

## 2020-03-11 PROCEDURE — 36415 COLL VENOUS BLD VENIPUNCTURE: CPT

## 2020-03-26 ENCOUNTER — HOSPITAL ENCOUNTER (OUTPATIENT)
Facility: MEDICAL CENTER | Age: 30
End: 2020-03-26
Attending: OBSTETRICS & GYNECOLOGY
Payer: COMMERCIAL

## 2020-03-26 PROCEDURE — 87491 CHLMYD TRACH DNA AMP PROBE: CPT

## 2020-03-26 PROCEDURE — 87591 N.GONORRHOEAE DNA AMP PROB: CPT

## 2020-03-27 LAB
C TRACH DNA SPEC QL NAA+PROBE: NEGATIVE
N GONORRHOEA DNA SPEC QL NAA+PROBE: NEGATIVE
SPECIMEN SOURCE: NORMAL

## 2020-06-11 DIAGNOSIS — G89.29 CHRONIC BILATERAL LOW BACK PAIN WITH SCIATICA, SCIATICA LATERALITY UNSPECIFIED: ICD-10-CM

## 2020-06-11 DIAGNOSIS — M54.40 CHRONIC BILATERAL LOW BACK PAIN WITH SCIATICA, SCIATICA LATERALITY UNSPECIFIED: ICD-10-CM

## 2020-06-11 RX ORDER — IBUPROFEN 800 MG/1
TABLET ORAL
Qty: 20 TAB | Refills: 3 | Status: ON HOLD | OUTPATIENT
Start: 2020-06-11 | End: 2021-01-02 | Stop reason: CLARIF

## 2020-06-23 ENCOUNTER — HOSPITAL ENCOUNTER (OUTPATIENT)
Dept: LAB | Facility: MEDICAL CENTER | Age: 30
End: 2020-06-23
Attending: OBSTETRICS & GYNECOLOGY
Payer: COMMERCIAL

## 2020-06-23 LAB
PROLACTIN SERPL-MCNC: 18.3 NG/ML (ref 2.8–26)
TSH SERPL DL<=0.005 MIU/L-ACNC: 1.71 UIU/ML (ref 0.38–5.33)

## 2020-06-23 PROCEDURE — 84146 ASSAY OF PROLACTIN: CPT

## 2020-06-23 PROCEDURE — 84443 ASSAY THYROID STIM HORMONE: CPT

## 2020-07-13 ENCOUNTER — HOSPITAL ENCOUNTER (OUTPATIENT)
Dept: LAB | Facility: MEDICAL CENTER | Age: 30
End: 2020-07-13
Attending: OBSTETRICS & GYNECOLOGY
Payer: COMMERCIAL

## 2020-07-13 LAB
ESTRADIOL SERPL-MCNC: 730 PG/ML
PROGEST SERPL-MCNC: 44.9 NG/ML

## 2020-07-13 PROCEDURE — 82670 ASSAY OF TOTAL ESTRADIOL: CPT

## 2020-07-13 PROCEDURE — 84144 ASSAY OF PROGESTERONE: CPT

## 2020-07-13 PROCEDURE — 36415 COLL VENOUS BLD VENIPUNCTURE: CPT

## 2020-07-16 ENCOUNTER — HOSPITAL ENCOUNTER (OUTPATIENT)
Dept: LAB | Facility: MEDICAL CENTER | Age: 30
End: 2020-07-16
Attending: OBSTETRICS & GYNECOLOGY
Payer: COMMERCIAL

## 2020-07-16 LAB
B-HCG SERPL-ACNC: 187 MIU/ML (ref 0–5)
ESTRADIOL SERPL-MCNC: 913 PG/ML
PROGEST SERPL-MCNC: 39 NG/ML

## 2020-07-16 PROCEDURE — 84702 CHORIONIC GONADOTROPIN TEST: CPT

## 2020-07-16 PROCEDURE — 84144 ASSAY OF PROGESTERONE: CPT

## 2020-07-16 PROCEDURE — 36415 COLL VENOUS BLD VENIPUNCTURE: CPT

## 2020-07-16 PROCEDURE — 87086 URINE CULTURE/COLONY COUNT: CPT

## 2020-07-16 PROCEDURE — 82670 ASSAY OF TOTAL ESTRADIOL: CPT

## 2020-07-18 LAB
BACTERIA UR CULT: NORMAL
SIGNIFICANT IND 70042: NORMAL
SITE SITE: NORMAL
SOURCE SOURCE: NORMAL

## 2020-07-20 ENCOUNTER — HOSPITAL ENCOUNTER (OUTPATIENT)
Dept: LAB | Facility: MEDICAL CENTER | Age: 30
End: 2020-07-20
Attending: OBSTETRICS & GYNECOLOGY
Payer: COMMERCIAL

## 2020-07-20 LAB
B-HCG SERPL-ACNC: 1647 MIU/ML (ref 0–5)
ESTRADIOL SERPL-MCNC: 1083 PG/ML
PROGEST SERPL-MCNC: 46.1 NG/ML

## 2020-07-20 PROCEDURE — 84144 ASSAY OF PROGESTERONE: CPT

## 2020-07-20 PROCEDURE — 36415 COLL VENOUS BLD VENIPUNCTURE: CPT

## 2020-07-20 PROCEDURE — 82670 ASSAY OF TOTAL ESTRADIOL: CPT

## 2020-07-20 PROCEDURE — 84702 CHORIONIC GONADOTROPIN TEST: CPT

## 2020-09-24 ENCOUNTER — HOSPITAL ENCOUNTER (OUTPATIENT)
Dept: LAB | Facility: MEDICAL CENTER | Age: 30
End: 2020-09-24
Attending: OBSTETRICS & GYNECOLOGY
Payer: COMMERCIAL

## 2020-09-24 LAB
ABO GROUP BLD: NORMAL
BASOPHILS # BLD AUTO: 0.2 % (ref 0–1.8)
BASOPHILS # BLD: 0.02 K/UL (ref 0–0.12)
BLD GP AB SCN SERPL QL: NORMAL
EOSINOPHIL # BLD AUTO: 0.06 K/UL (ref 0–0.51)
EOSINOPHIL NFR BLD: 0.6 % (ref 0–6.9)
ERYTHROCYTE [DISTWIDTH] IN BLOOD BY AUTOMATED COUNT: 44.4 FL (ref 35.9–50)
HBV SURFACE AG SER QL: ABNORMAL
HCT VFR BLD AUTO: 41.6 % (ref 37–47)
HCV AB SER QL: NORMAL
HGB BLD-MCNC: 14 G/DL (ref 12–16)
HIV 1+2 AB+HIV1 P24 AG SERPL QL IA: NORMAL
IMM GRANULOCYTES # BLD AUTO: 0.04 K/UL (ref 0–0.11)
IMM GRANULOCYTES NFR BLD AUTO: 0.4 % (ref 0–0.9)
LYMPHOCYTES # BLD AUTO: 1.62 K/UL (ref 1–4.8)
LYMPHOCYTES NFR BLD: 14.9 % (ref 22–41)
MCH RBC QN AUTO: 30.1 PG (ref 27–33)
MCHC RBC AUTO-ENTMCNC: 33.7 G/DL (ref 33.6–35)
MCV RBC AUTO: 89.5 FL (ref 81.4–97.8)
MONOCYTES # BLD AUTO: 0.5 K/UL (ref 0–0.85)
MONOCYTES NFR BLD AUTO: 4.6 % (ref 0–13.4)
NEUTROPHILS # BLD AUTO: 8.66 K/UL (ref 2–7.15)
NEUTROPHILS NFR BLD: 79.3 % (ref 44–72)
NRBC # BLD AUTO: 0 K/UL
NRBC BLD-RTO: 0 /100 WBC
PLATELET # BLD AUTO: 228 K/UL (ref 164–446)
PMV BLD AUTO: 10.7 FL (ref 9–12.9)
RBC # BLD AUTO: 4.65 M/UL (ref 4.2–5.4)
RH BLD: NORMAL
RUBV AB SER QL: 364 IU/ML
TREPONEMA PALLIDUM IGG+IGM AB [PRESENCE] IN SERUM OR PLASMA BY IMMUNOASSAY: ABNORMAL
WBC # BLD AUTO: 10.9 K/UL (ref 4.8–10.8)

## 2020-09-24 PROCEDURE — 87086 URINE CULTURE/COLONY COUNT: CPT

## 2020-09-24 PROCEDURE — 36415 COLL VENOUS BLD VENIPUNCTURE: CPT

## 2020-09-24 PROCEDURE — 86780 TREPONEMA PALLIDUM: CPT

## 2020-09-24 PROCEDURE — 86787 VARICELLA-ZOSTER ANTIBODY: CPT

## 2020-09-24 PROCEDURE — 87389 HIV-1 AG W/HIV-1&-2 AB AG IA: CPT

## 2020-09-24 PROCEDURE — 86900 BLOOD TYPING SEROLOGIC ABO: CPT

## 2020-09-24 PROCEDURE — 87340 HEPATITIS B SURFACE AG IA: CPT

## 2020-09-24 PROCEDURE — 86901 BLOOD TYPING SEROLOGIC RH(D): CPT

## 2020-09-24 PROCEDURE — 85025 COMPLETE CBC W/AUTO DIFF WBC: CPT

## 2020-09-24 PROCEDURE — 86762 RUBELLA ANTIBODY: CPT

## 2020-09-24 PROCEDURE — 86850 RBC ANTIBODY SCREEN: CPT

## 2020-09-24 PROCEDURE — 86803 HEPATITIS C AB TEST: CPT

## 2020-09-25 LAB — VZV IGG SER IA-ACNC: 0.75

## 2020-09-26 LAB
BACTERIA UR CULT: NORMAL
SIGNIFICANT IND 70042: NORMAL
SITE SITE: NORMAL
SOURCE SOURCE: NORMAL

## 2021-01-02 ENCOUNTER — HOSPITAL ENCOUNTER (OUTPATIENT)
Facility: MEDICAL CENTER | Age: 31
End: 2021-01-02
Attending: OBSTETRICS & GYNECOLOGY | Admitting: OBSTETRICS & GYNECOLOGY
Payer: COMMERCIAL

## 2021-01-02 ENCOUNTER — APPOINTMENT (OUTPATIENT)
Dept: OBGYN | Facility: MEDICAL CENTER | Age: 31
End: 2021-01-02
Attending: OBSTETRICS & GYNECOLOGY
Payer: COMMERCIAL

## 2021-01-02 VITALS
TEMPERATURE: 98.5 F | RESPIRATION RATE: 16 BRPM | HEIGHT: 67 IN | OXYGEN SATURATION: 96 % | DIASTOLIC BLOOD PRESSURE: 59 MMHG | BODY MASS INDEX: 28.88 KG/M2 | SYSTOLIC BLOOD PRESSURE: 105 MMHG | WEIGHT: 184 LBS | HEART RATE: 74 BPM

## 2021-01-02 PROCEDURE — 59025 FETAL NON-STRESS TEST: CPT

## 2021-01-02 ASSESSMENT — FIBROSIS 4 INDEX: FIB4 SCORE: 0.66

## 2021-01-02 NOTE — PROGRESS NOTES
30 y.o. G1 @ 28.2 IVF, velamentous cord insertion with Shortened cervix here to LDA1 for scheduled NST. EFM & Dalzell applied. VSS. Reactive NSt obtained. Report to Dr. Tucker, strip reviewed, Discharge order received. Discharge teaching performed. Discharged to home, ambulatory.

## 2021-01-03 NOTE — PROGRESS NOTES
DATE OF SERVICE:  2021     HISTORY OF PRESENT ILLNESS:  This is a 30-year-old  2, para 0 at 28   weeks and 2 days, who presented to the hospital for routine fetal   surveillance.  This pregnancy has been complicated by in vitro fertilization,   fetal growth restriction, known velamentous cord insertion.  There was   initially concern for possible diaphragmatic hernia or abnormal fetal   echocardiogram; however, both of these issues have been ruled out at this   point.  Fetal surveillance shows reactive 140s and reassuring tracing for   gestational age with no decelerations present.  Tocometer shows occasional   irritability.  Overall, fetal surveillance is reassuring for gestational age   and she will be discharged to home for followup in the office.        ______________________________  MD BOB MARROQUIN/MANISH/HERMAN    DD:  2021 11:24  DT:  2021 11:51    Job#:  [id number]

## 2021-01-22 ENCOUNTER — HOSPITAL ENCOUNTER (OUTPATIENT)
Dept: HOSPITAL 8 - RAD | Age: 31
Discharge: HOME | End: 2021-01-22
Attending: OBSTETRICS & GYNECOLOGY
Payer: COMMERCIAL

## 2021-01-22 DIAGNOSIS — N13.30: ICD-10-CM

## 2021-01-22 DIAGNOSIS — O99.891: ICD-10-CM

## 2021-01-22 DIAGNOSIS — J98.4: ICD-10-CM

## 2021-01-22 DIAGNOSIS — O28.3: Primary | ICD-10-CM

## 2021-01-22 DIAGNOSIS — K44.9: ICD-10-CM

## 2021-01-22 PROCEDURE — 72195 MRI PELVIS W/O DYE: CPT

## 2021-03-12 NOTE — H&P
DATE OF ADMISSION:  2021     HISTORY OF PRESENT ILLNESS:  This is a 30-year-old G1, P0 whose estimated date   of delivery is 2021.  At that point, she will be 39 weeks.  She is   scheduled for a primary  delivery secondary to breech presentation and   congenital diaphragmatic hernia.  The patient is currently without   complaints.  She reports good fetal movement. No contractions, vaginal   bleeding or loss of fluid.     REVIEW OF SYSTEMS:  Negative for nausea, vomiting, chest pain, shortness of   breath or loss of taste or smell.     PAST MEDICAL HISTORY:  None.     PAST SURGICAL HISTORY:  None.     MEDICATIONS:  Vitamin D and prenatal vitamins.     ALLERGIES:  No known drug allergies.     SOCIAL HISTORY:  The patient denies tobacco, ethanol or drugs.     OBSTETRICAL HISTORY:  This is her first pregnancy.  She conceived via IVF.    Pregnancy again is complicated by congenital diaphragmatic hernia, breech   presentation.     PHYSICAL EXAMINATION:    GENERAL:  She is a well-developed, well-nourished female in no apparent   distress.  VITAL SIGNS:  Weight is 191 pounds. Blood pressure 122/78.  HEART:  Regular rate and rhythm.  LUNGS:  Clear to auscultation bilaterally.  ABDOMEN:  Gravid and soft.  EXTREMITIES:  Show no clubbing, cyanosis or edema.  PELVIC:  Fetal heart tones are documented in the 130s.     PERTINENT PRENATAL LABORATORY DATA:  Her blood type is O positive, antibody   screen negative, RPR nonreactive, rubella immune, HIV negative, hepatitis B   negative.  One-hour Glucola normal.  Group B strep test is negative.     ASSESSMENT AND PLAN:  A 30-year-old G1, P0 with intrauterine gestation at 39   weeks.  1.  Fetus is in breech presentation.  The patient will require  for   that.  2.  Pregnancy is complicated by congenital diaphragmatic hernia.  The patient   has seen Dr. Shaylee Lux who is aware that this patient is scheduled for   surgery on this day.  She will be evaluating  the baby after delivery.  3.  Shortened cervix throughout pregnancy.  4.  Velamentous cord insertion.  All the patient's preimplantation genetics   were normal.  The patient will proceed with  as scheduled.  We   discussed the risks of surgery, which can include infection; bleeding; scar;   damage to bowel, bladder, ureters; emergency blood transfusion; emergency   hysterectomy.  The patient voiced understanding of the risks as described and   agrees to proceed as scheduled.        ______________________________  MD SCOOBY Rajput/VIVEK/HERMAN    DD:  2021 12:41  DT:  2021 13:36    Job#:  [id number]

## 2021-03-15 ENCOUNTER — HOSPITAL ENCOUNTER (OUTPATIENT)
Dept: OBGYN | Facility: MEDICAL CENTER | Age: 31
End: 2021-03-15
Attending: OBSTETRICS & GYNECOLOGY
Payer: COMMERCIAL

## 2021-03-15 LAB
SARS-COV-2 RNA RESP QL NAA+PROBE: NOTDETECTED
SPECIMEN SOURCE: NORMAL

## 2021-03-15 PROCEDURE — U0003 INFECTIOUS AGENT DETECTION BY NUCLEIC ACID (DNA OR RNA); SEVERE ACUTE RESPIRATORY SYNDROME CORONAVIRUS 2 (SARS-COV-2) (CORONAVIRUS DISEASE [COVID-19]), AMPLIFIED PROBE TECHNIQUE, MAKING USE OF HIGH THROUGHPUT TECHNOLOGIES AS DESCRIBED BY CMS-2020-01-R: HCPCS

## 2021-03-15 PROCEDURE — U0005 INFEC AGEN DETEC AMPLI PROBE: HCPCS

## 2021-03-15 NOTE — PROGRESS NOTES
Patient comes in for covid screening prior to scheduled c/s on 3/18.  Self isolation instructions given and reviewed.

## 2021-03-17 ENCOUNTER — PRE-ADMISSION TESTING (OUTPATIENT)
Dept: ADMISSIONS | Facility: MEDICAL CENTER | Age: 31
End: 2021-03-17
Attending: OBSTETRICS & GYNECOLOGY
Payer: COMMERCIAL

## 2021-03-17 ENCOUNTER — ANESTHESIA EVENT (OUTPATIENT)
Dept: OBGYN | Facility: MEDICAL CENTER | Age: 31
End: 2021-03-17
Payer: COMMERCIAL

## 2021-03-17 RX ORDER — ASPIRIN 81 MG/1
81 TABLET, CHEWABLE ORAL DAILY
Status: ON HOLD | COMMUNITY
End: 2021-03-19

## 2021-03-17 RX ORDER — MAGNESIUM GLUCONATE 27 MG(500)
500 TABLET ORAL DAILY
COMMUNITY
End: 2022-07-27

## 2021-03-18 ENCOUNTER — ANESTHESIA (OUTPATIENT)
Dept: OBGYN | Facility: MEDICAL CENTER | Age: 31
End: 2021-03-18
Payer: COMMERCIAL

## 2021-03-18 ENCOUNTER — HOSPITAL ENCOUNTER (INPATIENT)
Facility: MEDICAL CENTER | Age: 31
LOS: 1 days | End: 2021-03-19
Attending: OBSTETRICS & GYNECOLOGY | Admitting: OBSTETRICS & GYNECOLOGY
Payer: COMMERCIAL

## 2021-03-18 DIAGNOSIS — G89.18 POSTOPERATIVE PAIN: ICD-10-CM

## 2021-03-18 LAB
BASOPHILS # BLD AUTO: 0.2 % (ref 0–1.8)
BASOPHILS # BLD: 0.02 K/UL (ref 0–0.12)
BLD GP AB INVEST PLASRBC-IMP: ABNORMAL
BLD GP AB SCN SERPL QL: ABNORMAL
EOSINOPHIL # BLD AUTO: 0.04 K/UL (ref 0–0.51)
EOSINOPHIL NFR BLD: 0.4 % (ref 0–6.9)
ERYTHROCYTE [DISTWIDTH] IN BLOOD BY AUTOMATED COUNT: 41.3 FL (ref 35.9–50)
ERYTHROCYTE [DISTWIDTH] IN BLOOD BY AUTOMATED COUNT: 42.9 FL (ref 35.9–50)
HCT VFR BLD AUTO: 30.7 % (ref 37–47)
HCT VFR BLD AUTO: 31.5 % (ref 37–47)
HGB BLD-MCNC: 10.4 G/DL (ref 12–16)
HGB BLD-MCNC: 10.4 G/DL (ref 12–16)
HOLDING TUBE BB 8507: NORMAL
IMM GRANULOCYTES # BLD AUTO: 0.1 K/UL (ref 0–0.11)
IMM GRANULOCYTES NFR BLD AUTO: 1.1 % (ref 0–0.9)
LYMPHOCYTES # BLD AUTO: 1.43 K/UL (ref 1–4.8)
LYMPHOCYTES NFR BLD: 15.8 % (ref 22–41)
MCH RBC QN AUTO: 29.2 PG (ref 27–33)
MCH RBC QN AUTO: 29.5 PG (ref 27–33)
MCHC RBC AUTO-ENTMCNC: 33 G/DL (ref 33.6–35)
MCHC RBC AUTO-ENTMCNC: 33.9 G/DL (ref 33.6–35)
MCV RBC AUTO: 87.2 FL (ref 81.4–97.8)
MCV RBC AUTO: 88.5 FL (ref 81.4–97.8)
MONOCYTES # BLD AUTO: 0.75 K/UL (ref 0–0.85)
MONOCYTES NFR BLD AUTO: 8.3 % (ref 0–13.4)
NEUTROPHILS # BLD AUTO: 6.73 K/UL (ref 2–7.15)
NEUTROPHILS NFR BLD: 74.2 % (ref 44–72)
NRBC # BLD AUTO: 0 K/UL
NRBC BLD-RTO: 0 /100 WBC
PLATELET # BLD AUTO: 170 K/UL (ref 164–446)
PLATELET # BLD AUTO: 95 K/UL (ref 164–446)
PMV BLD AUTO: 10.4 FL (ref 9–12.9)
PMV BLD AUTO: 11.2 FL (ref 9–12.9)
RBC # BLD AUTO: 3.52 M/UL (ref 4.2–5.4)
RBC # BLD AUTO: 3.56 M/UL (ref 4.2–5.4)
WBC # BLD AUTO: 16.8 K/UL (ref 4.8–10.8)
WBC # BLD AUTO: 9.1 K/UL (ref 4.8–10.8)

## 2021-03-18 PROCEDURE — 700111 HCHG RX REV CODE 636 W/ 250 OVERRIDE (IP): Performed by: ANESTHESIOLOGY

## 2021-03-18 PROCEDURE — 160041 HCHG SURGERY MINUTES - EA ADDL 1 MIN LEVEL 4: Performed by: OBSTETRICS & GYNECOLOGY

## 2021-03-18 PROCEDURE — 160035 HCHG PACU - 1ST 60 MINS PHASE I: Performed by: OBSTETRICS & GYNECOLOGY

## 2021-03-18 PROCEDURE — 85025 COMPLETE CBC W/AUTO DIFF WBC: CPT

## 2021-03-18 PROCEDURE — 86870 RBC ANTIBODY IDENTIFICATION: CPT

## 2021-03-18 PROCEDURE — 700102 HCHG RX REV CODE 250 W/ 637 OVERRIDE(OP)

## 2021-03-18 PROCEDURE — A9270 NON-COVERED ITEM OR SERVICE: HCPCS

## 2021-03-18 PROCEDURE — A9270 NON-COVERED ITEM OR SERVICE: HCPCS | Performed by: OBSTETRICS & GYNECOLOGY

## 2021-03-18 PROCEDURE — 160029 HCHG SURGERY MINUTES - 1ST 30 MINS LEVEL 4: Performed by: OBSTETRICS & GYNECOLOGY

## 2021-03-18 PROCEDURE — 36415 COLL VENOUS BLD VENIPUNCTURE: CPT

## 2021-03-18 PROCEDURE — A9270 NON-COVERED ITEM OR SERVICE: HCPCS | Performed by: ANESTHESIOLOGY

## 2021-03-18 PROCEDURE — 85027 COMPLETE CBC AUTOMATED: CPT

## 2021-03-18 PROCEDURE — 160002 HCHG RECOVERY MINUTES (STAT): Performed by: OBSTETRICS & GYNECOLOGY

## 2021-03-18 PROCEDURE — 700105 HCHG RX REV CODE 258: Performed by: ANESTHESIOLOGY

## 2021-03-18 PROCEDURE — 160009 HCHG ANES TIME/MIN: Performed by: OBSTETRICS & GYNECOLOGY

## 2021-03-18 PROCEDURE — 86850 RBC ANTIBODY SCREEN: CPT

## 2021-03-18 PROCEDURE — 160048 HCHG OR STATISTICAL LEVEL 1-5: Performed by: OBSTETRICS & GYNECOLOGY

## 2021-03-18 PROCEDURE — 700111 HCHG RX REV CODE 636 W/ 250 OVERRIDE (IP)

## 2021-03-18 PROCEDURE — 700102 HCHG RX REV CODE 250 W/ 637 OVERRIDE(OP): Performed by: ANESTHESIOLOGY

## 2021-03-18 PROCEDURE — 770002 HCHG ROOM/CARE - OB PRIVATE (112)

## 2021-03-18 PROCEDURE — 700101 HCHG RX REV CODE 250: Performed by: ANESTHESIOLOGY

## 2021-03-18 PROCEDURE — 700102 HCHG RX REV CODE 250 W/ 637 OVERRIDE(OP): Performed by: OBSTETRICS & GYNECOLOGY

## 2021-03-18 PROCEDURE — 700111 HCHG RX REV CODE 636 W/ 250 OVERRIDE (IP): Performed by: OBSTETRICS & GYNECOLOGY

## 2021-03-18 RX ORDER — ONDANSETRON 2 MG/ML
4 INJECTION INTRAMUSCULAR; INTRAVENOUS EVERY 6 HOURS PRN
Status: DISCONTINUED | OUTPATIENT
Start: 2021-03-18 | End: 2021-03-19

## 2021-03-18 RX ORDER — SODIUM CHLORIDE, SODIUM GLUCONATE, SODIUM ACETATE, POTASSIUM CHLORIDE AND MAGNESIUM CHLORIDE 526; 502; 368; 37; 30 MG/100ML; MG/100ML; MG/100ML; MG/100ML; MG/100ML
INJECTION, SOLUTION INTRAVENOUS
Status: DISCONTINUED | OUTPATIENT
Start: 2021-03-18 | End: 2021-03-18 | Stop reason: SURG

## 2021-03-18 RX ORDER — METOCLOPRAMIDE HYDROCHLORIDE 5 MG/ML
10 INJECTION INTRAMUSCULAR; INTRAVENOUS ONCE
Status: COMPLETED | OUTPATIENT
Start: 2021-03-18 | End: 2021-03-18

## 2021-03-18 RX ORDER — MISOPROSTOL 200 UG/1
600 TABLET ORAL
Status: DISCONTINUED | OUTPATIENT
Start: 2021-03-18 | End: 2021-03-19 | Stop reason: HOSPADM

## 2021-03-18 RX ORDER — DOCUSATE SODIUM 100 MG/1
100 CAPSULE, LIQUID FILLED ORAL 2 TIMES DAILY PRN
Status: DISCONTINUED | OUTPATIENT
Start: 2021-03-18 | End: 2021-03-19 | Stop reason: HOSPADM

## 2021-03-18 RX ORDER — DIPHENHYDRAMINE HYDROCHLORIDE 50 MG/ML
12.5 INJECTION INTRAMUSCULAR; INTRAVENOUS EVERY 6 HOURS PRN
Status: DISCONTINUED | OUTPATIENT
Start: 2021-03-18 | End: 2021-03-19

## 2021-03-18 RX ORDER — ONDANSETRON 2 MG/ML
4 INJECTION INTRAMUSCULAR; INTRAVENOUS
Status: DISCONTINUED | OUTPATIENT
Start: 2021-03-18 | End: 2021-03-18 | Stop reason: HOSPADM

## 2021-03-18 RX ORDER — HALOPERIDOL 5 MG/ML
1 INJECTION INTRAMUSCULAR
Status: DISCONTINUED | OUTPATIENT
Start: 2021-03-18 | End: 2021-03-18 | Stop reason: HOSPADM

## 2021-03-18 RX ORDER — SODIUM CHLORIDE, SODIUM GLUCONATE, SODIUM ACETATE, POTASSIUM CHLORIDE AND MAGNESIUM CHLORIDE 526; 502; 368; 37; 30 MG/100ML; MG/100ML; MG/100ML; MG/100ML; MG/100ML
1500 INJECTION, SOLUTION INTRAVENOUS ONCE
Status: COMPLETED | OUTPATIENT
Start: 2021-03-18 | End: 2021-03-18

## 2021-03-18 RX ORDER — DEXAMETHASONE SODIUM PHOSPHATE 4 MG/ML
INJECTION, SOLUTION INTRA-ARTICULAR; INTRALESIONAL; INTRAMUSCULAR; INTRAVENOUS; SOFT TISSUE PRN
Status: DISCONTINUED | OUTPATIENT
Start: 2021-03-18 | End: 2021-03-18 | Stop reason: SURG

## 2021-03-18 RX ORDER — DIPHENHYDRAMINE HYDROCHLORIDE 50 MG/ML
25 INJECTION INTRAMUSCULAR; INTRAVENOUS EVERY 6 HOURS PRN
Status: DISCONTINUED | OUTPATIENT
Start: 2021-03-18 | End: 2021-03-19

## 2021-03-18 RX ORDER — METOCLOPRAMIDE HYDROCHLORIDE 5 MG/ML
INJECTION INTRAMUSCULAR; INTRAVENOUS
Status: COMPLETED
Start: 2021-03-18 | End: 2021-03-18

## 2021-03-18 RX ORDER — KETOROLAC TROMETHAMINE 30 MG/ML
30 INJECTION, SOLUTION INTRAMUSCULAR; INTRAVENOUS EVERY 6 HOURS
Status: DISCONTINUED | OUTPATIENT
Start: 2021-03-18 | End: 2021-03-19 | Stop reason: HOSPADM

## 2021-03-18 RX ORDER — HYDROMORPHONE HYDROCHLORIDE 1 MG/ML
0.1 INJECTION, SOLUTION INTRAMUSCULAR; INTRAVENOUS; SUBCUTANEOUS
Status: DISCONTINUED | OUTPATIENT
Start: 2021-03-18 | End: 2021-03-18 | Stop reason: HOSPADM

## 2021-03-18 RX ORDER — ACETAMINOPHEN 500 MG
1000 TABLET ORAL EVERY 6 HOURS
Status: COMPLETED | OUTPATIENT
Start: 2021-03-18 | End: 2021-03-19

## 2021-03-18 RX ORDER — METHYLERGONOVINE MALEATE 0.2 MG/ML
0.2 INJECTION INTRAVENOUS
Status: DISCONTINUED | OUTPATIENT
Start: 2021-03-18 | End: 2021-03-19 | Stop reason: HOSPADM

## 2021-03-18 RX ORDER — KETOROLAC TROMETHAMINE 30 MG/ML
INJECTION, SOLUTION INTRAMUSCULAR; INTRAVENOUS PRN
Status: DISCONTINUED | OUTPATIENT
Start: 2021-03-18 | End: 2021-03-18 | Stop reason: SURG

## 2021-03-18 RX ORDER — MEPERIDINE HYDROCHLORIDE 25 MG/ML
6.25 INJECTION INTRAMUSCULAR; INTRAVENOUS; SUBCUTANEOUS
Status: DISCONTINUED | OUTPATIENT
Start: 2021-03-18 | End: 2021-03-18 | Stop reason: HOSPADM

## 2021-03-18 RX ORDER — OXYTOCIN 10 [USP'U]/ML
INJECTION, SOLUTION INTRAMUSCULAR; INTRAVENOUS PRN
Status: DISCONTINUED | OUTPATIENT
Start: 2021-03-18 | End: 2021-03-18 | Stop reason: SURG

## 2021-03-18 RX ORDER — SODIUM CHLORIDE, SODIUM GLUCONATE, SODIUM ACETATE, POTASSIUM CHLORIDE AND MAGNESIUM CHLORIDE 526; 502; 368; 37; 30 MG/100ML; MG/100ML; MG/100ML; MG/100ML; MG/100ML
500 INJECTION, SOLUTION INTRAVENOUS CONTINUOUS
Status: DISCONTINUED | OUTPATIENT
Start: 2021-03-18 | End: 2021-03-18

## 2021-03-18 RX ORDER — OXYCODONE HYDROCHLORIDE 10 MG/1
10 TABLET ORAL EVERY 4 HOURS PRN
Status: DISCONTINUED | OUTPATIENT
Start: 2021-03-18 | End: 2021-03-19

## 2021-03-18 RX ORDER — HYDROMORPHONE HYDROCHLORIDE 1 MG/ML
0.2 INJECTION, SOLUTION INTRAMUSCULAR; INTRAVENOUS; SUBCUTANEOUS
Status: DISCONTINUED | OUTPATIENT
Start: 2021-03-18 | End: 2021-03-19

## 2021-03-18 RX ORDER — CITRIC ACID/SODIUM CITRATE 334-500MG
30 SOLUTION, ORAL ORAL ONCE
Status: COMPLETED | OUTPATIENT
Start: 2021-03-18 | End: 2021-03-18

## 2021-03-18 RX ORDER — CARBOPROST TROMETHAMINE 250 UG/ML
250 INJECTION, SOLUTION INTRAMUSCULAR
Status: DISCONTINUED | OUTPATIENT
Start: 2021-03-18 | End: 2021-03-19 | Stop reason: HOSPADM

## 2021-03-18 RX ORDER — BUPIVACAINE HYDROCHLORIDE 7.5 MG/ML
INJECTION, SOLUTION INTRASPINAL
Status: COMPLETED | OUTPATIENT
Start: 2021-03-18 | End: 2021-03-18

## 2021-03-18 RX ORDER — OXYCODONE HCL 5 MG/5 ML
5 SOLUTION, ORAL ORAL
Status: DISCONTINUED | OUTPATIENT
Start: 2021-03-18 | End: 2021-03-18 | Stop reason: HOSPADM

## 2021-03-18 RX ORDER — MORPHINE SULFATE 0.5 MG/ML
INJECTION, SOLUTION EPIDURAL; INTRATHECAL; INTRAVENOUS
Status: COMPLETED | OUTPATIENT
Start: 2021-03-18 | End: 2021-03-18

## 2021-03-18 RX ORDER — HYDROMORPHONE HYDROCHLORIDE 1 MG/ML
0.4 INJECTION, SOLUTION INTRAMUSCULAR; INTRAVENOUS; SUBCUTANEOUS
Status: DISCONTINUED | OUTPATIENT
Start: 2021-03-18 | End: 2021-03-18 | Stop reason: HOSPADM

## 2021-03-18 RX ORDER — OXYCODONE HCL 5 MG/5 ML
10 SOLUTION, ORAL ORAL
Status: DISCONTINUED | OUTPATIENT
Start: 2021-03-18 | End: 2021-03-18 | Stop reason: HOSPADM

## 2021-03-18 RX ORDER — CEFAZOLIN SODIUM 1 G/3ML
INJECTION, POWDER, FOR SOLUTION INTRAMUSCULAR; INTRAVENOUS PRN
Status: DISCONTINUED | OUTPATIENT
Start: 2021-03-18 | End: 2021-03-18 | Stop reason: SURG

## 2021-03-18 RX ORDER — SODIUM CHLORIDE, SODIUM LACTATE, POTASSIUM CHLORIDE, CALCIUM CHLORIDE 600; 310; 30; 20 MG/100ML; MG/100ML; MG/100ML; MG/100ML
INJECTION, SOLUTION INTRAVENOUS PRN
Status: DISCONTINUED | OUTPATIENT
Start: 2021-03-18 | End: 2021-03-19 | Stop reason: HOSPADM

## 2021-03-18 RX ORDER — HYDROMORPHONE HYDROCHLORIDE 1 MG/ML
0.4 INJECTION, SOLUTION INTRAMUSCULAR; INTRAVENOUS; SUBCUTANEOUS
Status: DISCONTINUED | OUTPATIENT
Start: 2021-03-18 | End: 2021-03-19

## 2021-03-18 RX ORDER — ONDANSETRON 2 MG/ML
INJECTION INTRAMUSCULAR; INTRAVENOUS PRN
Status: DISCONTINUED | OUTPATIENT
Start: 2021-03-18 | End: 2021-03-18 | Stop reason: SURG

## 2021-03-18 RX ORDER — OXYCODONE HYDROCHLORIDE 5 MG/1
5 TABLET ORAL EVERY 4 HOURS PRN
Status: DISCONTINUED | OUTPATIENT
Start: 2021-03-18 | End: 2021-03-19

## 2021-03-18 RX ORDER — CITRIC ACID/SODIUM CITRATE 334-500MG
SOLUTION, ORAL ORAL
Status: COMPLETED
Start: 2021-03-18 | End: 2021-03-18

## 2021-03-18 RX ORDER — PHENYLEPHRINE HCL IN 0.9% NACL 0.5 MG/5ML
SYRINGE (ML) INTRAVENOUS PRN
Status: DISCONTINUED | OUTPATIENT
Start: 2021-03-18 | End: 2021-03-18 | Stop reason: SURG

## 2021-03-18 RX ORDER — HYDROMORPHONE HYDROCHLORIDE 1 MG/ML
0.2 INJECTION, SOLUTION INTRAMUSCULAR; INTRAVENOUS; SUBCUTANEOUS
Status: DISCONTINUED | OUTPATIENT
Start: 2021-03-18 | End: 2021-03-18 | Stop reason: HOSPADM

## 2021-03-18 RX ADMIN — SODIUM CHLORIDE, SODIUM GLUCONATE, SODIUM ACETATE, POTASSIUM CHLORIDE AND MAGNESIUM CHLORIDE: 526; 502; 368; 37; 30 INJECTION, SOLUTION INTRAVENOUS at 07:39

## 2021-03-18 RX ADMIN — PHENYLEPHRINE HYDROCHLORIDE 30 MCG/MIN: 10 INJECTION INTRAVENOUS at 07:46

## 2021-03-18 RX ADMIN — Medication 30 ML: at 07:05

## 2021-03-18 RX ADMIN — METOCLOPRAMIDE 10 MG: 5 INJECTION, SOLUTION INTRAMUSCULAR; INTRAVENOUS at 07:05

## 2021-03-18 RX ADMIN — FENTANYL CITRATE 10 MCG: 50 INJECTION, SOLUTION INTRAMUSCULAR; INTRAVENOUS at 07:46

## 2021-03-18 RX ADMIN — OXYTOCIN 20 UNITS: 10 INJECTION, SOLUTION INTRAMUSCULAR; INTRAVENOUS at 08:08

## 2021-03-18 RX ADMIN — ACETAMINOPHEN 1000 MG: 500 TABLET, FILM COATED ORAL at 10:59

## 2021-03-18 RX ADMIN — DEXAMETHASONE SODIUM PHOSPHATE 8 MG: 4 INJECTION, SOLUTION INTRA-ARTICULAR; INTRALESIONAL; INTRAMUSCULAR; INTRAVENOUS; SOFT TISSUE at 08:08

## 2021-03-18 RX ADMIN — KETOROLAC TROMETHAMINE 30 MG: 30 INJECTION, SOLUTION INTRAMUSCULAR at 23:29

## 2021-03-18 RX ADMIN — MORPHINE SULFATE 150 MCG: 0.5 INJECTION, SOLUTION EPIDURAL; INTRATHECAL; INTRAVENOUS at 07:46

## 2021-03-18 RX ADMIN — KETOROLAC TROMETHAMINE 30 MG: 30 INJECTION, SOLUTION INTRAMUSCULAR at 14:34

## 2021-03-18 RX ADMIN — ACETAMINOPHEN 1000 MG: 500 TABLET, FILM COATED ORAL at 16:58

## 2021-03-18 RX ADMIN — FAMOTIDINE 20 MG: 10 INJECTION INTRAVENOUS at 07:05

## 2021-03-18 RX ADMIN — METOCLOPRAMIDE HYDROCHLORIDE 10 MG: 5 INJECTION INTRAMUSCULAR; INTRAVENOUS at 07:05

## 2021-03-18 RX ADMIN — ACETAMINOPHEN 1000 MG: 500 TABLET, FILM COATED ORAL at 22:42

## 2021-03-18 RX ADMIN — KETOROLAC TROMETHAMINE 30 MG: 30 INJECTION, SOLUTION INTRAMUSCULAR at 08:28

## 2021-03-18 RX ADMIN — EPHEDRINE SULFATE 5 MG: 50 INJECTION, SOLUTION INTRAVENOUS at 07:57

## 2021-03-18 RX ADMIN — Medication 100 MCG: at 07:51

## 2021-03-18 RX ADMIN — ONDANSETRON 4 MG: 2 INJECTION INTRAMUSCULAR; INTRAVENOUS at 07:54

## 2021-03-18 RX ADMIN — SODIUM CHLORIDE, SODIUM GLUCONATE, SODIUM ACETATE, POTASSIUM CHLORIDE AND MAGNESIUM CHLORIDE 1500 ML: 526; 502; 368; 37; 30 INJECTION, SOLUTION INTRAVENOUS at 07:32

## 2021-03-18 RX ADMIN — DOCUSATE SODIUM 100 MG: 100 CAPSULE, LIQUID FILLED ORAL at 23:29

## 2021-03-18 RX ADMIN — SODIUM CITRATE AND CITRIC ACID MONOHYDRATE 30 ML: 500; 334 SOLUTION ORAL at 07:05

## 2021-03-18 RX ADMIN — OXYTOCIN 125 ML/HR: 10 INJECTION, SOLUTION INTRAMUSCULAR; INTRAVENOUS at 08:56

## 2021-03-18 RX ADMIN — BUPIVACAINE HYDROCHLORIDE IN DEXTROSE 1.6 ML: 7.5 INJECTION, SOLUTION SUBARACHNOID at 07:46

## 2021-03-18 RX ADMIN — CEFAZOLIN 2 G: 330 INJECTION, POWDER, FOR SOLUTION INTRAMUSCULAR; INTRAVENOUS at 07:51

## 2021-03-18 ASSESSMENT — PATIENT HEALTH QUESTIONNAIRE - PHQ9
1. LITTLE INTEREST OR PLEASURE IN DOING THINGS: NOT AT ALL
2. FEELING DOWN, DEPRESSED, IRRITABLE, OR HOPELESS: NOT AT ALL
SUM OF ALL RESPONSES TO PHQ9 QUESTIONS 1 AND 2: 0

## 2021-03-18 ASSESSMENT — PAIN DESCRIPTION - PAIN TYPE
TYPE: SURGICAL PAIN

## 2021-03-18 ASSESSMENT — FIBROSIS 4 INDEX: FIB4 SCORE: 0.66

## 2021-03-18 NOTE — ANESTHESIA PROCEDURE NOTES
Spinal Block    Date/Time: 3/18/2021 7:46 AM  Performed by: Bella Orourke M.D.  Authorized by: Bella Orourke M.D.     Patient Location:  [facility]  Start Time:  3/18/2021 7:46 AM  End Time:  3/18/2021 7:49 AM  Reason for Block: primary anesthetic    patient identified, IV checked, site marked, risks and benefits discussed, surgical consent, monitors and equipment checked, pre-op evaluation and timeout performed    Patient Position:  Sitting  Prep: ChloraPrep, patient draped and sterile technique    Monitoring:  Blood pressure, continuous pulse oximetry and heart rate  Approach:  Midline  Location:  L2-3  Injection Technique:  Single-shot  Skin infiltration:  Lidocaine  Strength:  1%  Dose:  3ml  Needle Type:  Pencan  Needle Gauge:  25 G  CSF flowing pre/post injection:  Yes  Sensory Level:  T4  Events: paresthesia-transient/resolved     History of scoliosis.  Transient left-sided paresthesia, resolved with redirection. Pt tolerated procedure well

## 2021-03-18 NOTE — OP REPORT
Operative procedure note    Preoperative diagnosis intrauterine pregnancy at 39 weeks, breech presentation, in vitro fertilization, gestational thrombocytopenia, congenital diaphragmatic hernia    Postoperative diagnosis: Same    Surgeon Dr. Fuentes  Assistant Dr. Morillo    Anesthesia Dr. Ferrell/spinal    Estimated blood loss 500 cc    Intraoperative findings female infant in breech presentation Apgar 2, 2 7, normal uterus tubes, ovaries with bilateral theca lutein cyst.    Procedure in detail  After informed consent had been obtained the patient was taken to the operating room where spinal anesthesia was then found to be adequate.  She was prepped and draped in usual sterile fashion  A Pfannenstiel skin incision was made carried down to the underlying layer of fascia with a knife the fascia was then nicked in the midline elevated and the fascial incision extended laterally on both sides with Pizarro scissors.  The rectus muscles were then dissected off the posterior aspect of the fascia both superiorly and inferiorly there were then  in the midline the peritoneum was elevated between 2 hemostats and entered sharply with Metzenbaum scissors the peritoneal incision then extended superiorly and inferiorly with good visualization of bowel bladder.  The vesicular uterine peritoneum was identified.  A bladder flap was created.  The uterus was then incised in a low transverse fashion and extended bluntly.  The infant female was delivered through the incision without difficulty in breech presentation the breech was delivered without complication with the rest the body to follow the arms were swept across the face the head flexed and delivered.  After 30 seconds the cord was then clamped x2 and cut the infant female was then handed to awaiting NICU staff.  The placenta which showed a velamentous cord insertion was then delivered manually.  The uterus was then cleared of all clots and debris with a moist lap sponge  uterine incision then closed with a #1 chromic in a running locked fashion.  The abdomen was then cleared of all clots and debris with a moist lap sponge hemostasis was found to be adequate at the hysterotomy site.  The Beltran O retractor was then removed.  The peritoneum was then closed with a 0 Vicryl in a running fashion.  The muscles were reapproximated with a Vicryl stitch the fascia closed with 0 Vicryl in a running fashion the subcutaneous layer was checked for hemostasis which was noted to be adequate reapproximated with a 2-0 plain.  The skin was closed in a subcuticular fashion with a 4-0 Vicryl.  Sponge lap needle counts were all correct x2 and the patient is taken to the recovery room in good condition.

## 2021-03-18 NOTE — PROGRESS NOTES
Received patient to room S-326 via w/c.  Report received from GERALDO Dowd and assumed care of patient.  She was oriented to room, call light. Discussed that patient will call RN if she has increasing pain. Plan of care discussed.  Encouraged to call with any needs.

## 2021-03-18 NOTE — ANESTHESIA TIME REPORT
Anesthesia Start and Stop Event Times     Date Time Event    3/18/2021 0730 Ready for Procedure     0739 Anesthesia Start     0837 Anesthesia Stop        Responsible Staff  03/18/21    Name Role Begin End    Bella Orourke M.D. Anesth 0739 0837        Preop Diagnosis (Free Text):  Pre-op Diagnosis     BREECH        Preop Diagnosis (Codes):    Post op Diagnosis  Pregnancy  breech    Premium Reason  Non-Premium    Comments:

## 2021-03-18 NOTE — ANESTHESIA POSTPROCEDURE EVALUATION
Patient: Brunilda Webber    Procedure Summary     Date: 21 Room / Location: [facility] OR 01 / SURGERY LABOR AND DELIVERY    Anesthesia Start: 739 Anesthesia Stop:     Procedure:  SECTION, PRIMARY Diagnosis: (BREECH)    Surgeons: Esperanza Fuentes M.D. Responsible Provider: Bella Orourke M.D.    Anesthesia Type: spinal ASA Status: 2          Final Anesthesia Type: spinal  Last vitals  BP 99/54     Temp   96.9   Pulse   75   Resp   16   SpO2   95%     Anesthesia Post Evaluation    Patient location during evaluation: PACU  Patient participation: complete - patient participated  Level of consciousness: awake and alert    Airway patency: patent  Anesthetic complications: no  Cardiovascular status: hemodynamically stable  Respiratory status: acceptable  Hydration status: euvolemic    PONV: none    patient able to participate, but full recovery from regional anesthesia has not occurred and is not expected within the stipulated timeframe for the completion of the evaluation      No complications documented.

## 2021-03-18 NOTE — PROGRESS NOTES
0553: Pt presents to L&D for scheduled C/S for breech presentation and congenital diaphragmatic hernia. EFM/Margaret applied. Pt prepped for surgery.     0700: Report to Nitza CHOW. POC discussed.

## 2021-03-18 NOTE — CARE PLAN
Problem: Communication  Goal: The ability to communicate needs accurately and effectively will improve  Outcome: PROGRESSING AS EXPECTED     Problem: Safety  Goal: Will remain free from injury  Outcome: PROGRESSING AS EXPECTED  Goal: Will remain free from falls  Outcome: PROGRESSING AS EXPECTED     Problem: Infection  Goal: Will remain free from infection  Outcome: PROGRESSING AS EXPECTED     Problem: Altered physiologic condition related to postoperative  delivery  Goal: Patient physiologically stable as evidenced by normal lochia, palpable uterine involution and vital signs within normal limits  Outcome: PROGRESSING AS EXPECTED     Problem: Potential for postpartum infection related to surgical incision, compromised uterine condition, urinary tract or respiratory compromise  Goal: Patient will be afebrile and free from signs and symptoms of infection  Outcome: PROGRESSING AS EXPECTED

## 2021-03-18 NOTE — PROGRESS NOTES
Patient out of bed with assistance and transferred to wheelchair.  Patient taken to NICU via wheelchair to see infant prior to infant's transfer to [facility].  Patient tolerated well.

## 2021-03-18 NOTE — PROGRESS NOTES
0739- In OR. NICU aware and will do pick-up due to fetal anomaly.  0807- Delivery of viable female, apgars 2/2/7 Baby taken to NICU for care.   0834- Pt in PACU. BP 89/41. Pt asymptomatic and denies any pain.  0840- BP 79/40. Dr Orourke at bedside and IV ephedrine given. Pt asymptomatic. Order to bolus in 250 ml of her Pitocin bag.   0842- BP 99/54  0923-Second 250 ml bolus with pitocin due to BP 89/48 per Dr Orourke's order.   0939- Dr Orourke called and made aware of pt BP 90/55 and pt is asymptomatic. Orders to allow pt to transfer up to PPU.   0947- Pt transferred to PPU via gurney with side rails up. Report given to GERALDO Redman.

## 2021-03-18 NOTE — ANESTHESIA PREPROCEDURE EVALUATION
31 yo  at 39-0 for primary  due to breech presentation complicated by congenital diaphragmatic hernia. History of scoliosis.     Relevant Problems   No relevant active problems       Physical Exam    Airway   Mallampati: II  TM distance: >3 FB  Neck ROM: full       Cardiovascular - normal exam  Rhythm: regular  Rate: normal  (-) murmur     Dental - normal exam           Pulmonary - normal exam  Breath sounds clear to auscultation     Abdominal    Neurological - normal exam                 Anesthesia Plan    ASA 2       Plan - spinal   Neuraxial block will be primary anesthetic                Postoperative Plan: Postoperative administration of opioids is intended.    Pertinent diagnostic labs and testing reviewed    Informed Consent:    Anesthetic plan and risks discussed with patient and spouse.    Use of blood products discussed with: patient and spouse whom consented to blood products.

## 2021-03-18 NOTE — OR SURGEON
Immediate Post OP Note    PreOp Diagnosis: IUP 39 weeks, Breech, congenital diaphragmatic hernia    PostOp Diagnosis: same    Procedure(s):   SECTION, PRIMARY    Surgeon(s):  LARA Rajput M.D.    Anesthesiologist/Type of Anesthesia:  Coger/spinal    Surgical Staff:  * No surgical staff found *    Specimens removed if any:  * No specimens in log *    Estimated Blood Loss: 500 cc    Findings: female presentation breech APGAR   Uterus Normal, Tubes normal Abnormal - theca lutein cysts      Complications: none        3/18/2021 7:39 AM Esperanza Fuentes M.D.

## 2021-03-18 NOTE — DISCHARGE PLANNING
LSW completed [facility] with both, MOB's and baby's medical information and had MOB sign.  LSW contacted imaging and requested discs to be sent.  Baby's transport time is 4:30pm-5:00pm.

## 2021-03-19 ENCOUNTER — PHARMACY VISIT (OUTPATIENT)
Dept: PHARMACY | Facility: MEDICAL CENTER | Age: 31
End: 2021-03-19
Payer: MEDICARE

## 2021-03-19 VITALS
HEIGHT: 67 IN | SYSTOLIC BLOOD PRESSURE: 104 MMHG | DIASTOLIC BLOOD PRESSURE: 64 MMHG | WEIGHT: 192 LBS | BODY MASS INDEX: 30.13 KG/M2 | HEART RATE: 74 BPM | RESPIRATION RATE: 18 BRPM | TEMPERATURE: 97.6 F | OXYGEN SATURATION: 96 %

## 2021-03-19 PROBLEM — G89.18 POSTOPERATIVE PAIN: Status: ACTIVE | Noted: 2021-03-19

## 2021-03-19 LAB
BASOPHILS # BLD AUTO: 0.3 % (ref 0–1.8)
BASOPHILS # BLD: 0.05 K/UL (ref 0–0.12)
EOSINOPHIL # BLD AUTO: 0.06 K/UL (ref 0–0.51)
EOSINOPHIL NFR BLD: 0.4 % (ref 0–6.9)
ERYTHROCYTE [DISTWIDTH] IN BLOOD BY AUTOMATED COUNT: 41.6 FL (ref 35.9–50)
HCT VFR BLD AUTO: 30.4 % (ref 37–47)
HGB BLD-MCNC: 10.3 G/DL (ref 12–16)
IMM GRANULOCYTES # BLD AUTO: 0.13 K/UL (ref 0–0.11)
IMM GRANULOCYTES NFR BLD AUTO: 0.9 % (ref 0–0.9)
LYMPHOCYTES # BLD AUTO: 2.77 K/UL (ref 1–4.8)
LYMPHOCYTES NFR BLD: 18.9 % (ref 22–41)
MCH RBC QN AUTO: 29.5 PG (ref 27–33)
MCHC RBC AUTO-ENTMCNC: 33.9 G/DL (ref 33.6–35)
MCV RBC AUTO: 87.1 FL (ref 81.4–97.8)
MONOCYTES # BLD AUTO: 1.27 K/UL (ref 0–0.85)
MONOCYTES NFR BLD AUTO: 8.6 % (ref 0–13.4)
NEUTROPHILS # BLD AUTO: 10.41 K/UL (ref 2–7.15)
NEUTROPHILS NFR BLD: 70.9 % (ref 44–72)
NRBC # BLD AUTO: 0 K/UL
NRBC BLD-RTO: 0 /100 WBC
PLATELET # BLD AUTO: 168 K/UL (ref 164–446)
PMV BLD AUTO: 10 FL (ref 9–12.9)
RBC # BLD AUTO: 3.49 M/UL (ref 4.2–5.4)
WBC # BLD AUTO: 14.7 K/UL (ref 4.8–10.8)

## 2021-03-19 PROCEDURE — A9270 NON-COVERED ITEM OR SERVICE: HCPCS | Performed by: OBSTETRICS & GYNECOLOGY

## 2021-03-19 PROCEDURE — 700102 HCHG RX REV CODE 250 W/ 637 OVERRIDE(OP): Performed by: OBSTETRICS & GYNECOLOGY

## 2021-03-19 PROCEDURE — 700102 HCHG RX REV CODE 250 W/ 637 OVERRIDE(OP): Performed by: ANESTHESIOLOGY

## 2021-03-19 PROCEDURE — RXMED WILLOW AMBULATORY MEDICATION CHARGE: Performed by: OBSTETRICS & GYNECOLOGY

## 2021-03-19 PROCEDURE — 700111 HCHG RX REV CODE 636 W/ 250 OVERRIDE (IP): Performed by: ANESTHESIOLOGY

## 2021-03-19 PROCEDURE — A9270 NON-COVERED ITEM OR SERVICE: HCPCS | Performed by: ANESTHESIOLOGY

## 2021-03-19 PROCEDURE — 36415 COLL VENOUS BLD VENIPUNCTURE: CPT

## 2021-03-19 PROCEDURE — 85025 COMPLETE CBC W/AUTO DIFF WBC: CPT

## 2021-03-19 RX ORDER — ONDANSETRON 4 MG/1
4 TABLET, ORALLY DISINTEGRATING ORAL EVERY 6 HOURS PRN
Status: DISCONTINUED | OUTPATIENT
Start: 2021-03-19 | End: 2021-03-19 | Stop reason: HOSPADM

## 2021-03-19 RX ORDER — OXYCODONE HYDROCHLORIDE 5 MG/1
5 TABLET ORAL EVERY 6 HOURS PRN
Qty: 20 TABLET | Refills: 0 | Status: SHIPPED | OUTPATIENT
Start: 2021-03-19 | End: 2021-03-24

## 2021-03-19 RX ORDER — IBUPROFEN 600 MG/1
600 TABLET ORAL EVERY 6 HOURS PRN
Status: DISCONTINUED | OUTPATIENT
Start: 2021-03-19 | End: 2021-03-19 | Stop reason: HOSPADM

## 2021-03-19 RX ORDER — IBUPROFEN 600 MG/1
600 TABLET ORAL EVERY 6 HOURS PRN
Qty: 30 TABLET | Refills: 0 | Status: SHIPPED | OUTPATIENT
Start: 2021-03-19 | End: 2022-07-27

## 2021-03-19 RX ORDER — OXYCODONE HYDROCHLORIDE 5 MG/1
5 TABLET ORAL EVERY 4 HOURS PRN
Status: DISCONTINUED | OUTPATIENT
Start: 2021-03-19 | End: 2021-03-19 | Stop reason: HOSPADM

## 2021-03-19 RX ORDER — ONDANSETRON 2 MG/ML
4 INJECTION INTRAMUSCULAR; INTRAVENOUS EVERY 6 HOURS PRN
Status: DISCONTINUED | OUTPATIENT
Start: 2021-03-19 | End: 2021-03-19 | Stop reason: HOSPADM

## 2021-03-19 RX ORDER — MORPHINE SULFATE 10 MG/ML
4 INJECTION, SOLUTION INTRAMUSCULAR; INTRAVENOUS
Status: DISCONTINUED | OUTPATIENT
Start: 2021-03-19 | End: 2021-03-19 | Stop reason: HOSPADM

## 2021-03-19 RX ORDER — OXYCODONE HYDROCHLORIDE 10 MG/1
10 TABLET ORAL EVERY 4 HOURS PRN
Status: DISCONTINUED | OUTPATIENT
Start: 2021-03-19 | End: 2021-03-19 | Stop reason: HOSPADM

## 2021-03-19 RX ADMIN — ACETAMINOPHEN 1000 MG: 500 TABLET, FILM COATED ORAL at 05:31

## 2021-03-19 RX ADMIN — KETOROLAC TROMETHAMINE 30 MG: 30 INJECTION, SOLUTION INTRAMUSCULAR at 05:32

## 2021-03-19 RX ADMIN — IBUPROFEN 600 MG: 600 TABLET, FILM COATED ORAL at 11:41

## 2021-03-19 RX ADMIN — OXYCODONE 5 MG: 5 TABLET ORAL at 11:41

## 2021-03-19 ASSESSMENT — PAIN DESCRIPTION - PAIN TYPE
TYPE: SURGICAL PAIN
TYPE: SURGICAL PAIN
TYPE: ACUTE PAIN

## 2021-03-19 NOTE — PROGRESS NOTES
Patient discharged home with family. All personal belongings collected. Discharge instructions discussed. Medications reviewed; meds-to-beds delivered. Follow up appointments to be scheduled by patient at earliest convenience. Patient escorted off unit via wheelchair with all personal belongings and family without incident.

## 2021-03-19 NOTE — PROGRESS NOTES
Assumed care of patient at bedside report from NOC RN. Updated on POC. Patient currently A & O x 4; on room air; up self; without complaints of acute pain. Per report, shultz removed at 0545, discussed need to void by 1145. Call light within reach. Fall precautions in place. Bed locked and in lowest position. All questions answered. No other needs indicated at this time.

## 2021-03-19 NOTE — CARE PLAN
Problem: Altered physiologic condition related to postoperative  delivery  Goal: Patient physiologically stable as evidenced by normal lochia, palpable uterine involution and vital signs within normal limits  Outcome: PROGRESSING AS EXPECTED  Note: Fundus firm. Lochia light. Vital signs WDL.       Problem: Alteration in comfort related to surgical incision and/or after birth pains  Goal: Patient is able to ambulate, care for self and infant with acceptable pain level  Outcome: PROGRESSING AS EXPECTED  Note: Pt able to ambulate, care for self. Pt states her pain is controlled with scheduled pain medication. Pt's pain reassessed throughout this shift.

## 2021-03-19 NOTE — DISCHARGE INSTRUCTIONS
PATIENT DISCHARGE EDUCATION INSTRUCTION SHEET  REASONS TO CALL YOUR OBSTETRICIAN  · Persistent fever, shaking, chills (Temperature higher than 100.4) may indicate you have an infection  · Heavy bleeding: soaking more than 1 pad per hour; Passing clots an egg-sized clot or bigger may mean you have an postpartum hemorrhage  · Foul odor from vagina or bad smelling or discolored discharge or blood  · Breast infection (Mastitis symptoms); breast pain, chills, fever, redness or red streaks, may feel flu like symptoms  · Urinary pain, burning or frequency  · Incision that is not healing, increased redness, swelling, tenderness or pain, or any pus from episiotomy or  site may mean you have an infection  · Redness, swelling, warmth, or painful to touch in the calf area of your leg may mean you have a blood clot  · Severe or intensified depression, thoughts or feelings of wanting to hurt yourself or someone else   · Pain in chest, obstructed breathing or shortness of breath (trouble catching your breath) may mean you are having a postpartum complication. Call your provider immediately   · Headache that does not get better, even after taking medicine, a bad headache with vision changes or pain in the upper right area of your belly may mean you have high blood pressure or post birth preeclampsia. Call your provider immediately    HAND WASHING  All family and friends should wash their hands:  · Before and after holding the baby  · Before feeding the baby  · After using the restroom or changing the baby's diaper    WOUND CARE  Ask your physician for additional care instructions. In general:  ·  Incision:  · May shower and pat incision dry   · Keep the incision clean and dry  · There should not be any opening or pus from the incision  · Continue to walk at home 3 times a day   · Do NOT lift anything heavier than your baby (over 10 pounds)  · Encourage family to help participate in care of the  to allow  rest and mom time to heal  · Episiotomy/Laceration  · May use santiago-spray bottle, witch hazel pads and dermaplast spray for comfort  · Use santiago-spray bottle after urinating to cleanse perineal area  · To prevent burning during urination spray santiago-water bottle on labial area   · Pat perineal area dry until episiotomy/laceration is healed  · Continue to use santiago-bottle until bleeding stops as needed  · If have a 2nd degree laceration or greater, a Sitz bath can offer relief from soreness, burning, and inflammation   · Sitz Bath   · Sit in 6 inches of warm water and soak laceration as needed until the laceration heals    VAGINAL CARE AND BLEEDING  · Nothing inside vagina for 6 weeks:   · No sexual intercourse, tampons or douching  · Bleeding may continue for 2-4 weeks. Amount and color may vary  · Soaking 1 pad or more in an hour for several hours is considered heavy bleeding  · Passing large egg sized blood clots can be concerning  · If you feel like you have heavy bleeding or are having increasing amount of blood clots call your Obstetrician immediately  · If you begin feeling faint upon standing, feeling sick to your stomach, have clammy skin, a really fast heartbeat, have chills, start feeling confused, dizzy, sleepy or weak, or feeling like you're going to faint call your Obstetrician immediately    HYPERTENSION   Preeclampsia or gestational hypertension are types of high blood pressure that only pregnant women can get. It is important for you to be aware of symptoms to seek early intervention and treatment. If you have any of these symptoms immediately call your Obstetrician    · Vision changes or blurred vision   · Severe headache or pain that is unrelieved with medication and will not go away  · Persistent pain in upper abdomen or shoulder   · Increased swelling of face, feet, or hands  · Difficulty breathing or shortness of breath at rest  · Urinating less than usual    URINATION AND BOWEL MOVEMENTS  · Eating  "more fiber (bran cereal, fruits, and vegetables) and drinking plenty of fluids will help to avoid constipation  · Urinary frequency and urgency after childbirth is normal  · If you experience any urinary pain, burning or frequency call your provider    BIRTH CONTROL  · It is possible to become pregnant at any time after delivery and while breastfeeding  · Plan to discuss a method of birth control with your physician at your post delivery follow up visit    POSTPARTUM BLUES  During the first few days after birth, you may experience a sense of the \"blues\" which may include impatience, irritability or even crying. These feelings come and go quickly. However, as many as 1 in 10 women experience emotional symptoms known as postpartum depression.     POSTPARTUM DEPRESSION    May start as early as the second or third day after delivery or take several weeks or months to develop. Symptoms of \"blues\" are present, but are more intense: Crying spells; loss of appetite; feelings of hopelessness or loss of control; fear of touching the baby; over concern or no concern at all about the baby; little or no concern about your own appearance/caring for yourself; and/or inability to sleep or excessive sleeping. Contact your Obstetrician if you are experiencing any of these symptoms     PREVENTING SHAKEN BABY  If you are angry or stressed, PUT THE BABY IN THE CRIB, step away, take some deep breaths, and wait until you are calm to care for the baby. DO NOT SHAKE THE BABY. You are not alone, call a supporter for help.  · [facility] 24/7 crisis call line (938-169-0102) or (1-243.817.6439)  · You can also text them, text \"ANSWER\" (108764)      "

## 2021-03-19 NOTE — DISCHARGE PLANNING
Meds-to-Beds: Discharge prescription orders listed below delivered to patient's bedside. GERALDO Chopra notified. Patient counseled. Patient elected to have co-payment billed to patient account. Patient presented valid photo ID.     Brunilda Webber   Home Medication Instructions TIMMY:[id number]    Printed on:03/19/21 1233   Medication Information                      ibuprofen (MOTRIN) 600 MG Tab  Take 1 tablet by mouth every 6 hours as needed.             oxyCODONE immediate-release (ROXICODONE) 5 MG Tab  Take 1 tablet by mouth every 6 hours as needed for up to 5 days.               Tiffanie Salvador, PharmD

## 2021-03-19 NOTE — CARE PLAN
Problem: Communication  Goal: The ability to communicate needs accurately and effectively will improve  Outcome: PROGRESSING AS EXPECTED  Intervention: Educate patient and significant other/support system about the plan of care, procedures, treatments, medications and allow for questions  Note: Discussed daily POC. Discussed MD rounding. Pt asked appropriate questions. Pt verbalized understanding.       Problem: Urinary Elimination:  Goal: Ability to reestablish a normal urinary elimination pattern will improve  Outcome: PROGRESSING AS EXPECTED  Intervention: Assess and monitor for signs and symptoms of urinary retention  Note: Pt voided within 6 hour window after catheter removal. Encouraged voiding schedule and oral fluids intake

## 2021-03-19 NOTE — PROGRESS NOTES
2030- Assessment completed. WDL. Vital signs stable. Patient progressing according to plan of care. Patient up and ambulating. Patient claims to have good pain relief with scheduled pain medications. Pt states she will call when she needs prn pain medication. Pt denies any other issues at this time.Pt encouraged to call for any needs.     2300-Dr. Morillo was notified of pt's increase platelet count.

## 2021-03-19 NOTE — PROGRESS NOTES
Received message from lab of increase platelets to 170. Primary Rn Ashlie notified of lab call and platelets.

## 2021-03-19 NOTE — DISCHARGE SUMMARY
Discharge Summary:      Brunilda Webber    Admit Date:   3/18/2021  Discharge Date:  3/19/2021     Admitting diagnosis:  Indication for care in labor or delivery [O75.9]  Discharge Diagnosis: Status post  for breech.  Pregnancy Complications: BAby with [facility] and transferred to [facility]   Tubal Ligation:  no        History:  Past Medical History:   Diagnosis Date   • Vitamin D deficiency      OB History    Para Term  AB Living   2 1 1   1 1   SAB TAB Ectopic Molar Multiple Live Births           0 1      # Outcome Date GA Lbr Wild/2nd Weight Sex Delivery Anes PTL Lv   2 Term 21 39w0d   F CS-LTranv Spinal N KEESHA   1 AB                 Nkda [no known drug allergy] and Onion  Patient Active Problem List    Diagnosis Date Noted   • Postoperative pain 2021   • Altered taste 2019   • Weight gain finding 2019   • Food allergy 2019   • Bowel habit changes 2019   • Hematochezia 2019   • Vitamin D deficiency         Hospital Course:   30 y.o. , now para 1, was admitted with the above mentioned diagnosis, underwent Primary  breech,  for unknown reason. Patient postpartum course was unremarkable, with progressive advancement in diet , ambulation and toleration of oral analgesia. Patient without complaints today and desires discharge.      Vitals:    21 0000 21 0100 21 0200 21 0600   BP:   103/49 104/64   Pulse: 80 73 83 74   Resp: 18 18 18 18   Temp:   36.9 °C (98.4 °F) 36.4 °C (97.6 °F)   TempSrc:   Temporal Temporal   SpO2: 98% 97% 98% 96%   Weight:       Height:           Current Facility-Administered Medications   Medication Dose   • LR infusion     • PRN oxytocin (PITOCIN) (20 Units/1000 mL) PRN for excessive uterine bleeding - See Admin Instr  125-999 mL/hr   • miSOPROStol (CYTOTEC) tablet 600 mcg  600 mcg   • methylergonovine (METHERGINE) injection 0.2 mg  0.2 mg   • carboPROST (HEMABATE) injection 250 mcg  250  mcg   • docusate sodium (COLACE) capsule 100 mg  100 mg   • ketorolac (TORADOL) injection 30 mg  30 mg   • oxyCODONE immediate-release (ROXICODONE) tablet 5 mg  5 mg   • oxyCODONE immediate release (ROXICODONE) tablet 10 mg  10 mg   • HYDROmorphone (Dilaudid) injection 0.2 mg  0.2 mg   • HYDROmorphone (Dilaudid) injection 0.4 mg  0.4 mg   • ePHEDrine injection 10 mg  10 mg   • ondansetron (ZOFRAN) syringe/vial injection 4 mg  4 mg   • diphenhydrAMINE (BENADRYL) injection 12.5 mg  12.5 mg   • naloxone (NARCAN) 0.4 mg in NS 1,000 mL infusion  0.25 mcg/kg/hr   • diphenhydrAMINE (BENADRYL) injection 12.5 mg  12.5 mg    Or   • diphenhydrAMINE (BENADRYL) injection 25 mg  25 mg    Or   • naloxone (NARCAN) 0.4 mg in NS 1,000 mL infusion  0.4 mg   • oxytocin (PITOCIN) infusion (for postpartum)  2,000 mL/hr    Followed by   • oxytocin (PITOCIN) infusion (for postpartum)   mL/hr       Exam:  Breast Exam: negative  Abdomen: Abdomen soft, non-tender. BS normal. No masses,  No organomegaly  Fundus Non Tender: yes  Incision: draining clear fluid  Perineum: perineum intact  Extremity: extremities, peripheral pulses and reflexes normal     Labs:  Recent Labs     03/18/21  0630 03/18/21  2019 03/19/21  0556   WBC 9.1 16.8* 14.7*   RBC 3.56* 3.52* 3.49*   HEMOGLOBIN 10.4* 10.4* 10.3*   HEMATOCRIT 31.5* 30.7* 30.4*   MCV 88.5 87.2 87.1   MCH 29.2 29.5 29.5   MCHC 33.0* 33.9 33.9   RDW 42.9 41.3 41.6   PLATELETCT 95* 170 168   MPV 11.2 10.4 10.0        Activity:   Discharge to home  Pelvic Rest x 6 weeks    Assessment:  Course complicated by baby transferred to NICU in [place]; pt requesting early discharge  Discharge Assessment: Other; ok for d/c if able to urinate, eat, and ambulate     Follow up: .2w with Dr. Fuentes.      Discharge Meds:   Current Outpatient Medications   Medication Sig Dispense Refill   • oxyCODONE immediate-release (ROXICODONE) 5 MG Tab Take 1 tablet by mouth every 6 hours as needed for up to 5 days. 20 tablet  0   • ibuprofen (MOTRIN) 600 MG Tab Take 1 tablet by mouth every 6 hours as needed. 30 tablet 0       Mary Ann Morillo M.D.

## 2021-03-19 NOTE — PROGRESS NOTES
POD#1    S/ pain controlled, less vaginal bleeding this am, has not been able to urinate yet, eating and ambulating some    O/  Vitals:    03/19/21 0000 03/19/21 0100 03/19/21 0200 03/19/21 0600   BP:   103/49 104/64   Pulse: 80 73 83 74   Resp: 18 18 18 18   Temp:   36.9 °C (98.4 °F) 36.4 °C (97.6 °F)   TempSrc:   Temporal Temporal   SpO2: 98% 97% 98% 96%   Weight:       Height:         Recent Labs     03/18/21  0630 03/18/21 2019 03/19/21  0556   WBC 9.1 16.8* 14.7*   RBC 3.56* 3.52* 3.49*   HEMOGLOBIN 10.4* 10.4* 10.3*   HEMATOCRIT 31.5* 30.7* 30.4*   MCV 88.5 87.2 87.1   MCH 29.2 29.5 29.5   RDW 42.9 41.3 41.6   PLATELETCT 95* 170 168   MPV 11.2 10.4 10.0   NEUTSPOLYS 74.20*  --  70.90   LYMPHOCYTES 15.80*  --  18.90*   MONOCYTES 8.30  --  8.60   EOSINOPHILS 0.40  --  0.40   BASOPHILS 0.20  --  0.30     gen-nad  abd-soft ff below umb  inc-dressing intact  ext-no edema    A&P/POD#1 s/p Primary LTCS for beech, baby with [facility] and transferred last night to [facility], pt is anxious to leave today but has not yet urinated, also has not needed any other pain meds; Patient's sister will be driving her to [place] today if possible and reports she won't let her do anything. Ok for d/c if can urinate and control pain with PO pain meds. GAve precautions.

## 2021-09-15 ENCOUNTER — HOSPITAL ENCOUNTER (OUTPATIENT)
Dept: LAB | Facility: MEDICAL CENTER | Age: 31
End: 2021-09-15
Attending: FAMILY MEDICINE
Payer: COMMERCIAL

## 2021-09-15 LAB
APPEARANCE UR: CLEAR
BACTERIA #/AREA URNS HPF: ABNORMAL /HPF
BASOPHILS # BLD AUTO: 0.9 % (ref 0–1.8)
BASOPHILS # BLD: 0.06 K/UL (ref 0–0.12)
BILIRUB UR QL STRIP.AUTO: NEGATIVE
COLOR UR: YELLOW
EOSINOPHIL # BLD AUTO: 0.04 K/UL (ref 0–0.51)
EOSINOPHIL NFR BLD: 0.6 % (ref 0–6.9)
EPI CELLS #/AREA URNS HPF: ABNORMAL /HPF
ERYTHROCYTE [DISTWIDTH] IN BLOOD BY AUTOMATED COUNT: 46 FL (ref 35.9–50)
GLUCOSE UR STRIP.AUTO-MCNC: NEGATIVE MG/DL
HCT VFR BLD AUTO: 41.8 % (ref 37–47)
HGB BLD-MCNC: 13.6 G/DL (ref 12–16)
HYALINE CASTS #/AREA URNS LPF: ABNORMAL /LPF
IMM GRANULOCYTES # BLD AUTO: 0.03 K/UL (ref 0–0.11)
IMM GRANULOCYTES NFR BLD AUTO: 0.4 % (ref 0–0.9)
KETONES UR STRIP.AUTO-MCNC: NEGATIVE MG/DL
LEUKOCYTE ESTERASE UR QL STRIP.AUTO: ABNORMAL
LYMPHOCYTES # BLD AUTO: 2 K/UL (ref 1–4.8)
LYMPHOCYTES NFR BLD: 29.5 % (ref 22–41)
MCH RBC QN AUTO: 27.1 PG (ref 27–33)
MCHC RBC AUTO-ENTMCNC: 32.5 G/DL (ref 33.6–35)
MCV RBC AUTO: 83.4 FL (ref 81.4–97.8)
MICRO URNS: ABNORMAL
MONOCYTES # BLD AUTO: 0.57 K/UL (ref 0–0.85)
MONOCYTES NFR BLD AUTO: 8.4 % (ref 0–13.4)
NEUTROPHILS # BLD AUTO: 4.08 K/UL (ref 2–7.15)
NEUTROPHILS NFR BLD: 60.2 % (ref 44–72)
NITRITE UR QL STRIP.AUTO: NEGATIVE
NRBC # BLD AUTO: 0 K/UL
NRBC BLD-RTO: 0 /100 WBC
PH UR STRIP.AUTO: 7.5 [PH] (ref 5–8)
PLATELET # BLD AUTO: 225 K/UL (ref 164–446)
PMV BLD AUTO: 9.8 FL (ref 9–12.9)
PROT UR QL STRIP: NEGATIVE MG/DL
RBC # BLD AUTO: 5.01 M/UL (ref 4.2–5.4)
RBC # URNS HPF: ABNORMAL /HPF
RBC UR QL AUTO: NEGATIVE
SP GR UR STRIP.AUTO: 1.02
UROBILINOGEN UR STRIP.AUTO-MCNC: 0.2 MG/DL
WBC # BLD AUTO: 6.8 K/UL (ref 4.8–10.8)
WBC #/AREA URNS HPF: ABNORMAL /HPF

## 2021-09-15 PROCEDURE — 85025 COMPLETE CBC W/AUTO DIFF WBC: CPT

## 2021-09-15 PROCEDURE — 81001 URINALYSIS AUTO W/SCOPE: CPT

## 2021-09-15 PROCEDURE — 36415 COLL VENOUS BLD VENIPUNCTURE: CPT

## 2022-05-31 ENCOUNTER — HOSPITAL ENCOUNTER (OUTPATIENT)
Dept: LAB | Facility: MEDICAL CENTER | Age: 32
End: 2022-05-31
Attending: OBSTETRICS & GYNECOLOGY
Payer: COMMERCIAL

## 2022-05-31 LAB
HBV SURFACE AB SERPL IA-ACNC: 313 MIU/ML (ref 0–10)
HBV SURFACE AG SER QL: NORMAL
HCV AB SER QL: NORMAL
HIV 1+2 AB+HIV1 P24 AG SERPL QL IA: NORMAL
PROLACTIN SERPL-MCNC: 11.5 NG/ML (ref 2.8–26)
T PALLIDUM AB SER QL IA: NORMAL
TSH SERPL DL<=0.005 MIU/L-ACNC: 1.15 UIU/ML (ref 0.38–5.33)

## 2022-05-31 PROCEDURE — 87340 HEPATITIS B SURFACE AG IA: CPT

## 2022-05-31 PROCEDURE — 87591 N.GONORRHOEAE DNA AMP PROB: CPT

## 2022-05-31 PROCEDURE — 36415 COLL VENOUS BLD VENIPUNCTURE: CPT

## 2022-05-31 PROCEDURE — 86706 HEP B SURFACE ANTIBODY: CPT

## 2022-05-31 PROCEDURE — 87491 CHLMYD TRACH DNA AMP PROBE: CPT

## 2022-05-31 PROCEDURE — 84146 ASSAY OF PROLACTIN: CPT

## 2022-05-31 PROCEDURE — 86803 HEPATITIS C AB TEST: CPT

## 2022-05-31 PROCEDURE — 84443 ASSAY THYROID STIM HORMONE: CPT

## 2022-05-31 PROCEDURE — 86780 TREPONEMA PALLIDUM: CPT

## 2022-05-31 PROCEDURE — 87389 HIV-1 AG W/HIV-1&-2 AB AG IA: CPT

## 2022-07-27 ENCOUNTER — APPOINTMENT (OUTPATIENT)
Dept: RADIOLOGY | Facility: MEDICAL CENTER | Age: 32
End: 2022-07-27
Attending: EMERGENCY MEDICINE
Payer: COMMERCIAL

## 2022-07-27 ENCOUNTER — ANESTHESIA (OUTPATIENT)
Dept: SURGERY | Facility: MEDICAL CENTER | Age: 32
End: 2022-07-27
Payer: COMMERCIAL

## 2022-07-27 ENCOUNTER — HOSPITAL ENCOUNTER (EMERGENCY)
Facility: MEDICAL CENTER | Age: 32
End: 2022-07-27
Attending: EMERGENCY MEDICINE
Payer: COMMERCIAL

## 2022-07-27 ENCOUNTER — HOSPITAL ENCOUNTER (OUTPATIENT)
Facility: MEDICAL CENTER | Age: 32
End: 2022-07-28
Attending: EMERGENCY MEDICINE | Admitting: OBSTETRICS & GYNECOLOGY
Payer: COMMERCIAL

## 2022-07-27 ENCOUNTER — ANESTHESIA EVENT (OUTPATIENT)
Dept: SURGERY | Facility: MEDICAL CENTER | Age: 32
End: 2022-07-27
Payer: COMMERCIAL

## 2022-07-27 VITALS
OXYGEN SATURATION: 99 % | HEART RATE: 90 BPM | HEIGHT: 67 IN | TEMPERATURE: 97.7 F | BODY MASS INDEX: 29.1 KG/M2 | SYSTOLIC BLOOD PRESSURE: 113 MMHG | DIASTOLIC BLOOD PRESSURE: 65 MMHG | RESPIRATION RATE: 18 BRPM | WEIGHT: 185.41 LBS

## 2022-07-27 DIAGNOSIS — O00.201 RIGHT OVARIAN PREGNANCY, UNSPECIFIED WHETHER INTRAUTERINE PREGNANCY PRESENT: ICD-10-CM

## 2022-07-27 DIAGNOSIS — K35.33 APPENDICITIS WITH PERITONITIS: Primary | ICD-10-CM

## 2022-07-27 DIAGNOSIS — O00.211: ICD-10-CM

## 2022-07-27 LAB
ALBUMIN SERPL BCP-MCNC: 4 G/DL (ref 3.2–4.9)
ALBUMIN/GLOB SERPL: 1.1 G/DL
ALP SERPL-CCNC: 68 U/L (ref 30–99)
ALT SERPL-CCNC: 25 U/L (ref 2–50)
ANION GAP SERPL CALC-SCNC: 12 MMOL/L (ref 7–16)
APPEARANCE UR: CLEAR
AST SERPL-CCNC: 30 U/L (ref 12–45)
B-HCG SERPL-ACNC: 4293 MIU/ML (ref 0–10)
BASOPHILS # BLD AUTO: 0.4 % (ref 0–1.8)
BASOPHILS # BLD: 0.07 K/UL (ref 0–0.12)
BILIRUB SERPL-MCNC: 0.7 MG/DL (ref 0.1–1.5)
BILIRUB UR QL STRIP.AUTO: ABNORMAL
BUN SERPL-MCNC: 9 MG/DL (ref 8–22)
CALCIUM SERPL-MCNC: 8.8 MG/DL (ref 8.4–10.2)
CHLORIDE SERPL-SCNC: 101 MMOL/L (ref 96–112)
CO2 SERPL-SCNC: 20 MMOL/L (ref 20–33)
COLOR UR: YELLOW
CREAT SERPL-MCNC: 0.61 MG/DL (ref 0.5–1.4)
EOSINOPHIL # BLD AUTO: 0.36 K/UL (ref 0–0.51)
EOSINOPHIL NFR BLD: 2.1 % (ref 0–6.9)
ERYTHROCYTE [DISTWIDTH] IN BLOOD BY AUTOMATED COUNT: 43.7 FL (ref 35.9–50)
GFR SERPLBLD CREATININE-BSD FMLA CKD-EPI: 123 ML/MIN/1.73 M 2
GLOBULIN SER CALC-MCNC: 3.7 G/DL (ref 1.9–3.5)
GLUCOSE SERPL-MCNC: 115 MG/DL (ref 65–99)
GLUCOSE UR STRIP.AUTO-MCNC: NEGATIVE MG/DL
HCT VFR BLD AUTO: 42.4 % (ref 37–47)
HGB BLD-MCNC: 14.5 G/DL (ref 12–16)
IMM GRANULOCYTES # BLD AUTO: 0.12 K/UL (ref 0–0.11)
IMM GRANULOCYTES NFR BLD AUTO: 0.7 % (ref 0–0.9)
KETONES UR STRIP.AUTO-MCNC: >=80 MG/DL
LEUKOCYTE ESTERASE UR QL STRIP.AUTO: NEGATIVE
LYMPHOCYTES # BLD AUTO: 1.7 K/UL (ref 1–4.8)
LYMPHOCYTES NFR BLD: 9.7 % (ref 22–41)
MCH RBC QN AUTO: 28.7 PG (ref 27–33)
MCHC RBC AUTO-ENTMCNC: 34.2 G/DL (ref 33.6–35)
MCV RBC AUTO: 83.8 FL (ref 81.4–97.8)
MICRO URNS: ABNORMAL
MONOCYTES # BLD AUTO: 0.75 K/UL (ref 0–0.85)
MONOCYTES NFR BLD AUTO: 4.3 % (ref 0–13.4)
NEUTROPHILS # BLD AUTO: 14.54 K/UL (ref 2–7.15)
NEUTROPHILS NFR BLD: 82.8 % (ref 44–72)
NITRITE UR QL STRIP.AUTO: NEGATIVE
NRBC # BLD AUTO: 0 K/UL
NRBC BLD-RTO: 0 /100 WBC
NUMBER OF RH DOSES IND 8505RD: NORMAL
PATHOLOGY CONSULT NOTE: NORMAL
PH UR STRIP.AUTO: 6 [PH] (ref 5–8)
PLATELET # BLD AUTO: 232 K/UL (ref 164–446)
PMV BLD AUTO: 9.1 FL (ref 9–12.9)
POTASSIUM SERPL-SCNC: 5 MMOL/L (ref 3.6–5.5)
PROT SERPL-MCNC: 7.7 G/DL (ref 6–8.2)
PROT UR QL STRIP: NEGATIVE MG/DL
RBC # BLD AUTO: 5.06 M/UL (ref 4.2–5.4)
RBC UR QL AUTO: NEGATIVE
RH BLD: NORMAL
SODIUM SERPL-SCNC: 133 MMOL/L (ref 135–145)
SP GR UR STRIP.AUTO: 1.02
WBC # BLD AUTO: 17.5 K/UL (ref 4.8–10.8)

## 2022-07-27 PROCEDURE — G0378 HOSPITAL OBSERVATION PER HR: HCPCS

## 2022-07-27 PROCEDURE — 160048 HCHG OR STATISTICAL LEVEL 1-5: Performed by: OBSTETRICS & GYNECOLOGY

## 2022-07-27 PROCEDURE — 99222 1ST HOSP IP/OBS MODERATE 55: CPT | Mod: 57 | Performed by: SURGERY

## 2022-07-27 PROCEDURE — 86901 BLOOD TYPING SEROLOGIC RH(D): CPT

## 2022-07-27 PROCEDURE — 80053 COMPREHEN METABOLIC PANEL: CPT

## 2022-07-27 PROCEDURE — 160002 HCHG RECOVERY MINUTES (STAT): Performed by: OBSTETRICS & GYNECOLOGY

## 2022-07-27 PROCEDURE — 81003 URINALYSIS AUTO W/O SCOPE: CPT

## 2022-07-27 PROCEDURE — 96374 THER/PROPH/DIAG INJ IV PUSH: CPT

## 2022-07-27 PROCEDURE — 96375 TX/PRO/DX INJ NEW DRUG ADDON: CPT

## 2022-07-27 PROCEDURE — 85025 COMPLETE CBC W/AUTO DIFF WBC: CPT

## 2022-07-27 PROCEDURE — 88304 TISSUE EXAM BY PATHOLOGIST: CPT

## 2022-07-27 PROCEDURE — 99291 CRITICAL CARE FIRST HOUR: CPT

## 2022-07-27 PROCEDURE — 160035 HCHG PACU - 1ST 60 MINS PHASE I: Performed by: OBSTETRICS & GYNECOLOGY

## 2022-07-27 PROCEDURE — 99140 ANES COMP EMERGENCY COND: CPT | Performed by: ANESTHESIOLOGY

## 2022-07-27 PROCEDURE — 88305 TISSUE EXAM BY PATHOLOGIST: CPT

## 2022-07-27 PROCEDURE — 700111 HCHG RX REV CODE 636 W/ 250 OVERRIDE (IP): Performed by: ANESTHESIOLOGY

## 2022-07-27 PROCEDURE — 160009 HCHG ANES TIME/MIN: Performed by: OBSTETRICS & GYNECOLOGY

## 2022-07-27 PROCEDURE — 700101 HCHG RX REV CODE 250: Performed by: ANESTHESIOLOGY

## 2022-07-27 PROCEDURE — 76801 OB US < 14 WKS SINGLE FETUS: CPT

## 2022-07-27 PROCEDURE — 36415 COLL VENOUS BLD VENIPUNCTURE: CPT

## 2022-07-27 PROCEDURE — 160041 HCHG SURGERY MINUTES - EA ADDL 1 MIN LEVEL 4: Performed by: OBSTETRICS & GYNECOLOGY

## 2022-07-27 PROCEDURE — A9270 NON-COVERED ITEM OR SERVICE: HCPCS | Performed by: OBSTETRICS & GYNECOLOGY

## 2022-07-27 PROCEDURE — 160029 HCHG SURGERY MINUTES - 1ST 30 MINS LEVEL 4: Performed by: OBSTETRICS & GYNECOLOGY

## 2022-07-27 PROCEDURE — 700105 HCHG RX REV CODE 258: Performed by: EMERGENCY MEDICINE

## 2022-07-27 PROCEDURE — 700111 HCHG RX REV CODE 636 W/ 250 OVERRIDE (IP): Performed by: EMERGENCY MEDICINE

## 2022-07-27 PROCEDURE — 110371 HCHG SHELL REV 272: Performed by: OBSTETRICS & GYNECOLOGY

## 2022-07-27 PROCEDURE — 00840 ANES IPER PX LOWER ABD NOS: CPT | Performed by: ANESTHESIOLOGY

## 2022-07-27 PROCEDURE — 99285 EMERGENCY DEPT VISIT HI MDM: CPT

## 2022-07-27 PROCEDURE — 700105 HCHG RX REV CODE 258: Performed by: OBSTETRICS & GYNECOLOGY

## 2022-07-27 PROCEDURE — 96376 TX/PRO/DX INJ SAME DRUG ADON: CPT

## 2022-07-27 PROCEDURE — 84702 CHORIONIC GONADOTROPIN TEST: CPT

## 2022-07-27 PROCEDURE — 700102 HCHG RX REV CODE 250 W/ 637 OVERRIDE(OP): Performed by: OBSTETRICS & GYNECOLOGY

## 2022-07-27 PROCEDURE — 700101 HCHG RX REV CODE 250: Performed by: OBSTETRICS & GYNECOLOGY

## 2022-07-27 PROCEDURE — 700105 HCHG RX REV CODE 258: Performed by: ANESTHESIOLOGY

## 2022-07-27 RX ORDER — HALOPERIDOL 5 MG/ML
1 INJECTION INTRAMUSCULAR
Status: DISCONTINUED | OUTPATIENT
Start: 2022-07-27 | End: 2022-07-27 | Stop reason: HOSPADM

## 2022-07-27 RX ORDER — OXYCODONE HYDROCHLORIDE 5 MG/1
5 TABLET ORAL
Status: DISCONTINUED | OUTPATIENT
Start: 2022-07-27 | End: 2022-07-28 | Stop reason: HOSPADM

## 2022-07-27 RX ORDER — HYDROMORPHONE HYDROCHLORIDE 1 MG/ML
0.5 INJECTION, SOLUTION INTRAMUSCULAR; INTRAVENOUS; SUBCUTANEOUS ONCE
Status: COMPLETED | OUTPATIENT
Start: 2022-07-27 | End: 2022-07-27

## 2022-07-27 RX ORDER — EPINEPHRINE 1 MG/ML(1)
AMPUL (ML) INJECTION
Status: DISPENSED
Start: 2022-07-27 | End: 2022-07-28

## 2022-07-27 RX ORDER — BUPIVACAINE HYDROCHLORIDE AND EPINEPHRINE 2.5; 5 MG/ML; UG/ML
INJECTION, SOLUTION EPIDURAL; INFILTRATION; INTRACAUDAL; PERINEURAL
Status: DISCONTINUED | OUTPATIENT
Start: 2022-07-27 | End: 2022-07-27 | Stop reason: HOSPADM

## 2022-07-27 RX ORDER — METOPROLOL TARTRATE 1 MG/ML
1 INJECTION, SOLUTION INTRAVENOUS
Status: DISCONTINUED | OUTPATIENT
Start: 2022-07-27 | End: 2022-07-27 | Stop reason: HOSPADM

## 2022-07-27 RX ORDER — OXYCODONE HCL 5 MG/5 ML
5 SOLUTION, ORAL ORAL
Status: DISCONTINUED | OUTPATIENT
Start: 2022-07-27 | End: 2022-07-27 | Stop reason: HOSPADM

## 2022-07-27 RX ORDER — FOLIC ACID 0.8 MG
2000 TABLET ORAL EVERY EVENING
Status: SHIPPED | COMMUNITY
End: 2022-08-21

## 2022-07-27 RX ORDER — DEXAMETHASONE SODIUM PHOSPHATE 4 MG/ML
INJECTION, SOLUTION INTRA-ARTICULAR; INTRALESIONAL; INTRAMUSCULAR; INTRAVENOUS; SOFT TISSUE PRN
Status: DISCONTINUED | OUTPATIENT
Start: 2022-07-27 | End: 2022-07-27 | Stop reason: SURG

## 2022-07-27 RX ORDER — MORPHINE SULFATE 4 MG/ML
4 INJECTION INTRAVENOUS ONCE
Status: COMPLETED | OUTPATIENT
Start: 2022-07-27 | End: 2022-07-27

## 2022-07-27 RX ORDER — ACETAMINOPHEN 500 MG
1000 TABLET ORAL EVERY 6 HOURS PRN
Status: DISCONTINUED | OUTPATIENT
Start: 2022-08-01 | End: 2022-07-28 | Stop reason: HOSPADM

## 2022-07-27 RX ORDER — SODIUM CHLORIDE, SODIUM LACTATE, POTASSIUM CHLORIDE, CALCIUM CHLORIDE 600; 310; 30; 20 MG/100ML; MG/100ML; MG/100ML; MG/100ML
INJECTION, SOLUTION INTRAVENOUS CONTINUOUS
Status: DISCONTINUED | OUTPATIENT
Start: 2022-07-27 | End: 2022-07-27 | Stop reason: HOSPADM

## 2022-07-27 RX ORDER — PHENYLEPHRINE HCL IN 0.9% NACL 0.5 MG/5ML
SYRINGE (ML) INTRAVENOUS PRN
Status: DISCONTINUED | OUTPATIENT
Start: 2022-07-27 | End: 2022-07-27 | Stop reason: SURG

## 2022-07-27 RX ORDER — HYDROMORPHONE HYDROCHLORIDE 1 MG/ML
0.4 INJECTION, SOLUTION INTRAMUSCULAR; INTRAVENOUS; SUBCUTANEOUS
Status: DISCONTINUED | OUTPATIENT
Start: 2022-07-27 | End: 2022-07-27 | Stop reason: HOSPADM

## 2022-07-27 RX ORDER — HYDROMORPHONE HYDROCHLORIDE 1 MG/ML
0.2 INJECTION, SOLUTION INTRAMUSCULAR; INTRAVENOUS; SUBCUTANEOUS
Status: DISCONTINUED | OUTPATIENT
Start: 2022-07-27 | End: 2022-07-27 | Stop reason: HOSPADM

## 2022-07-27 RX ORDER — ROCURONIUM BROMIDE 10 MG/ML
INJECTION, SOLUTION INTRAVENOUS PRN
Status: DISCONTINUED | OUTPATIENT
Start: 2022-07-27 | End: 2022-07-27 | Stop reason: SURG

## 2022-07-27 RX ORDER — SODIUM CHLORIDE, SODIUM LACTATE, POTASSIUM CHLORIDE, CALCIUM CHLORIDE 600; 310; 30; 20 MG/100ML; MG/100ML; MG/100ML; MG/100ML
1000 INJECTION, SOLUTION INTRAVENOUS ONCE
Status: COMPLETED | OUTPATIENT
Start: 2022-07-27 | End: 2022-07-27

## 2022-07-27 RX ORDER — DEXTROSE MONOHYDRATE, SODIUM CHLORIDE, AND POTASSIUM CHLORIDE 50; 1.49; 4.5 G/1000ML; G/1000ML; G/1000ML
INJECTION, SOLUTION INTRAVENOUS CONTINUOUS
Status: DISCONTINUED | OUTPATIENT
Start: 2022-07-27 | End: 2022-07-28 | Stop reason: HOSPADM

## 2022-07-27 RX ORDER — ONDANSETRON 2 MG/ML
4 INJECTION INTRAMUSCULAR; INTRAVENOUS
Status: DISCONTINUED | OUTPATIENT
Start: 2022-07-27 | End: 2022-07-27 | Stop reason: HOSPADM

## 2022-07-27 RX ORDER — OXYCODONE HYDROCHLORIDE 5 MG/1
2.5 TABLET ORAL
Status: DISCONTINUED | OUTPATIENT
Start: 2022-07-27 | End: 2022-07-28 | Stop reason: HOSPADM

## 2022-07-27 RX ORDER — CEFOTETAN DISODIUM 1 G/10ML
INJECTION, POWDER, FOR SOLUTION INTRAMUSCULAR; INTRAVENOUS PRN
Status: DISCONTINUED | OUTPATIENT
Start: 2022-07-27 | End: 2022-07-27 | Stop reason: SURG

## 2022-07-27 RX ORDER — CEFAZOLIN SODIUM 1 G/3ML
INJECTION, POWDER, FOR SOLUTION INTRAMUSCULAR; INTRAVENOUS PRN
Status: DISCONTINUED | OUTPATIENT
Start: 2022-07-27 | End: 2022-07-27

## 2022-07-27 RX ORDER — SODIUM CHLORIDE, SODIUM LACTATE, POTASSIUM CHLORIDE, CALCIUM CHLORIDE 600; 310; 30; 20 MG/100ML; MG/100ML; MG/100ML; MG/100ML
INJECTION, SOLUTION INTRAVENOUS CONTINUOUS
Status: DISCONTINUED | OUTPATIENT
Start: 2022-07-27 | End: 2022-07-27

## 2022-07-27 RX ORDER — ONDANSETRON 2 MG/ML
4 INJECTION INTRAMUSCULAR; INTRAVENOUS EVERY 6 HOURS PRN
Status: DISCONTINUED | OUTPATIENT
Start: 2022-07-27 | End: 2022-07-28 | Stop reason: HOSPADM

## 2022-07-27 RX ORDER — ONDANSETRON 2 MG/ML
INJECTION INTRAMUSCULAR; INTRAVENOUS PRN
Status: DISCONTINUED | OUTPATIENT
Start: 2022-07-27 | End: 2022-07-27 | Stop reason: SURG

## 2022-07-27 RX ORDER — SODIUM CHLORIDE, SODIUM LACTATE, POTASSIUM CHLORIDE, CALCIUM CHLORIDE 600; 310; 30; 20 MG/100ML; MG/100ML; MG/100ML; MG/100ML
INJECTION, SOLUTION INTRAVENOUS
Status: DISCONTINUED | OUTPATIENT
Start: 2022-07-27 | End: 2022-07-27 | Stop reason: SURG

## 2022-07-27 RX ORDER — BUPIVACAINE HYDROCHLORIDE 2.5 MG/ML
INJECTION, SOLUTION EPIDURAL; INFILTRATION; INTRACAUDAL
Status: DISPENSED
Start: 2022-07-27 | End: 2022-07-28

## 2022-07-27 RX ORDER — MIDAZOLAM HYDROCHLORIDE 1 MG/ML
1 INJECTION INTRAMUSCULAR; INTRAVENOUS
Status: DISCONTINUED | OUTPATIENT
Start: 2022-07-27 | End: 2022-07-27 | Stop reason: HOSPADM

## 2022-07-27 RX ORDER — METRONIDAZOLE 500 MG/1
500 TABLET ORAL EVERY 12 HOURS
Status: DISCONTINUED | OUTPATIENT
Start: 2022-07-27 | End: 2022-07-28

## 2022-07-27 RX ORDER — LIDOCAINE HYDROCHLORIDE 20 MG/ML
INJECTION, SOLUTION EPIDURAL; INFILTRATION; INTRACAUDAL; PERINEURAL PRN
Status: DISCONTINUED | OUTPATIENT
Start: 2022-07-27 | End: 2022-07-27 | Stop reason: SURG

## 2022-07-27 RX ORDER — HYDROMORPHONE HYDROCHLORIDE 1 MG/ML
0.1 INJECTION, SOLUTION INTRAMUSCULAR; INTRAVENOUS; SUBCUTANEOUS
Status: DISCONTINUED | OUTPATIENT
Start: 2022-07-27 | End: 2022-07-27 | Stop reason: HOSPADM

## 2022-07-27 RX ORDER — ALBUTEROL SULFATE 2.5 MG/3ML
2.5 SOLUTION RESPIRATORY (INHALATION)
Status: DISCONTINUED | OUTPATIENT
Start: 2022-07-27 | End: 2022-07-27 | Stop reason: HOSPADM

## 2022-07-27 RX ORDER — KETOROLAC TROMETHAMINE 30 MG/ML
INJECTION, SOLUTION INTRAMUSCULAR; INTRAVENOUS PRN
Status: DISCONTINUED | OUTPATIENT
Start: 2022-07-27 | End: 2022-07-27 | Stop reason: SURG

## 2022-07-27 RX ORDER — MORPHINE SULFATE 4 MG/ML
4 INJECTION INTRAVENOUS
Status: DISCONTINUED | OUTPATIENT
Start: 2022-07-27 | End: 2022-07-27 | Stop reason: HOSPADM

## 2022-07-27 RX ORDER — HYDRALAZINE HYDROCHLORIDE 20 MG/ML
5 INJECTION INTRAMUSCULAR; INTRAVENOUS
Status: DISCONTINUED | OUTPATIENT
Start: 2022-07-27 | End: 2022-07-27 | Stop reason: HOSPADM

## 2022-07-27 RX ORDER — MORPHINE SULFATE 4 MG/ML
2 INJECTION INTRAVENOUS
Status: DISCONTINUED | OUTPATIENT
Start: 2022-07-27 | End: 2022-07-28 | Stop reason: HOSPADM

## 2022-07-27 RX ORDER — MEPERIDINE HYDROCHLORIDE 25 MG/ML
12.5 INJECTION INTRAMUSCULAR; INTRAVENOUS; SUBCUTANEOUS
Status: DISCONTINUED | OUTPATIENT
Start: 2022-07-27 | End: 2022-07-27 | Stop reason: HOSPADM

## 2022-07-27 RX ORDER — CEFOTETAN DISODIUM 1 G/10ML
2 INJECTION, POWDER, FOR SOLUTION INTRAMUSCULAR; INTRAVENOUS 2 TIMES DAILY
Status: COMPLETED | OUTPATIENT
Start: 2022-07-27 | End: 2022-07-27

## 2022-07-27 RX ORDER — OXYCODONE HCL 5 MG/5 ML
10 SOLUTION, ORAL ORAL
Status: DISCONTINUED | OUTPATIENT
Start: 2022-07-27 | End: 2022-07-27 | Stop reason: HOSPADM

## 2022-07-27 RX ORDER — PROGESTERONE 100 MG/1
100 INSERT VAGINAL 2 TIMES DAILY
Status: SHIPPED | COMMUNITY
End: 2022-08-21

## 2022-07-27 RX ORDER — PROGESTERONE 50 MG/ML
75 INJECTION, SOLUTION INTRAMUSCULAR
Status: ON HOLD | COMMUNITY
End: 2022-07-28

## 2022-07-27 RX ORDER — DIPHENHYDRAMINE HYDROCHLORIDE 50 MG/ML
12.5 INJECTION INTRAMUSCULAR; INTRAVENOUS
Status: DISCONTINUED | OUTPATIENT
Start: 2022-07-27 | End: 2022-07-27 | Stop reason: HOSPADM

## 2022-07-27 RX ORDER — SODIUM CHLORIDE 9 MG/ML
1000 INJECTION, SOLUTION INTRAVENOUS ONCE
Status: COMPLETED | OUTPATIENT
Start: 2022-07-27 | End: 2022-07-28

## 2022-07-27 RX ORDER — ONDANSETRON 2 MG/ML
4 INJECTION INTRAMUSCULAR; INTRAVENOUS ONCE
Status: COMPLETED | OUTPATIENT
Start: 2022-07-27 | End: 2022-07-27

## 2022-07-27 RX ORDER — ACETAMINOPHEN 500 MG
1000 TABLET ORAL EVERY 6 HOURS
Status: DISCONTINUED | OUTPATIENT
Start: 2022-07-27 | End: 2022-07-28 | Stop reason: HOSPADM

## 2022-07-27 RX ADMIN — HYDROMORPHONE HYDROCHLORIDE 0.5 MG: 1 INJECTION, SOLUTION INTRAMUSCULAR; INTRAVENOUS; SUBCUTANEOUS at 11:02

## 2022-07-27 RX ADMIN — SUGAMMADEX 200 MG: 100 INJECTION, SOLUTION INTRAVENOUS at 14:23

## 2022-07-27 RX ADMIN — ONDANSETRON 4 MG: 2 INJECTION INTRAMUSCULAR; INTRAVENOUS at 14:10

## 2022-07-27 RX ADMIN — SODIUM CHLORIDE 1000 ML: 9 INJECTION, SOLUTION INTRAVENOUS at 11:23

## 2022-07-27 RX ADMIN — EPHEDRINE SULFATE 5 MG: 50 INJECTION, SOLUTION INTRAVENOUS at 13:27

## 2022-07-27 RX ADMIN — KETOROLAC TROMETHAMINE 30 MG: 30 INJECTION, SOLUTION INTRAMUSCULAR at 14:10

## 2022-07-27 RX ADMIN — Medication 100 MCG: at 13:25

## 2022-07-27 RX ADMIN — FENTANYL CITRATE 50 MCG: 50 INJECTION, SOLUTION INTRAMUSCULAR; INTRAVENOUS at 14:26

## 2022-07-27 RX ADMIN — CEFOTETAN 2 G: 1 INJECTION, POWDER, FOR SOLUTION INTRAMUSCULAR; INTRAVENOUS at 13:47

## 2022-07-27 RX ADMIN — HYDROMORPHONE HYDROCHLORIDE 0.2 MG: 1 INJECTION, SOLUTION INTRAMUSCULAR; INTRAVENOUS; SUBCUTANEOUS at 17:28

## 2022-07-27 RX ADMIN — FENTANYL CITRATE 100 MCG: 50 INJECTION, SOLUTION INTRAMUSCULAR; INTRAVENOUS at 13:30

## 2022-07-27 RX ADMIN — ONDANSETRON 4 MG: 2 INJECTION INTRAMUSCULAR; INTRAVENOUS at 07:29

## 2022-07-27 RX ADMIN — METRONIDAZOLE 500 MG: 500 TABLET ORAL at 18:42

## 2022-07-27 RX ADMIN — PROPOFOL 200 MG: 10 INJECTION, EMULSION INTRAVENOUS at 13:20

## 2022-07-27 RX ADMIN — LIDOCAINE HYDROCHLORIDE 80 MG: 20 INJECTION, SOLUTION EPIDURAL; INFILTRATION; INTRACAUDAL at 13:20

## 2022-07-27 RX ADMIN — ACETAMINOPHEN 1000 MG: 500 TABLET, FILM COATED ORAL at 16:41

## 2022-07-27 RX ADMIN — ONDANSETRON 4 MG: 2 INJECTION INTRAMUSCULAR; INTRAVENOUS at 13:20

## 2022-07-27 RX ADMIN — FENTANYL CITRATE 100 MCG: 50 INJECTION, SOLUTION INTRAMUSCULAR; INTRAVENOUS at 13:20

## 2022-07-27 RX ADMIN — MORPHINE SULFATE 4 MG: 4 INJECTION INTRAVENOUS at 09:59

## 2022-07-27 RX ADMIN — DEXAMETHASONE SODIUM PHOSPHATE 8 MG: 4 INJECTION, SOLUTION INTRA-ARTICULAR; INTRALESIONAL; INTRAMUSCULAR; INTRAVENOUS; SOFT TISSUE at 13:20

## 2022-07-27 RX ADMIN — MORPHINE SULFATE 4 MG: 4 INJECTION INTRAVENOUS at 07:29

## 2022-07-27 RX ADMIN — SODIUM CHLORIDE, POTASSIUM CHLORIDE, SODIUM LACTATE AND CALCIUM CHLORIDE: 600; 310; 30; 20 INJECTION, SOLUTION INTRAVENOUS at 13:15

## 2022-07-27 RX ADMIN — ROCURONIUM BROMIDE 80 MG: 10 INJECTION, SOLUTION INTRAVENOUS at 13:20

## 2022-07-27 RX ADMIN — POTASSIUM CHLORIDE, DEXTROSE MONOHYDRATE AND SODIUM CHLORIDE: 150; 5; 450 INJECTION, SOLUTION INTRAVENOUS at 19:08

## 2022-07-27 RX ADMIN — CEFOTETAN DISODIUM 2 G: 1 INJECTION, POWDER, FOR SOLUTION INTRAMUSCULAR; INTRAVENOUS at 21:54

## 2022-07-27 RX ADMIN — SODIUM CHLORIDE, POTASSIUM CHLORIDE, SODIUM LACTATE AND CALCIUM CHLORIDE 1000 ML: 600; 310; 30; 20 INJECTION, SOLUTION INTRAVENOUS at 07:29

## 2022-07-27 RX ADMIN — SODIUM CHLORIDE, POTASSIUM CHLORIDE, SODIUM LACTATE AND CALCIUM CHLORIDE: 600; 310; 30; 20 INJECTION, SOLUTION INTRAVENOUS at 13:31

## 2022-07-27 ASSESSMENT — PATIENT HEALTH QUESTIONNAIRE - PHQ9
2. FEELING DOWN, DEPRESSED, IRRITABLE, OR HOPELESS: NOT AT ALL
SUM OF ALL RESPONSES TO PHQ9 QUESTIONS 1 AND 2: 0
1. LITTLE INTEREST OR PLEASURE IN DOING THINGS: NOT AT ALL

## 2022-07-27 ASSESSMENT — PAIN DESCRIPTION - PAIN TYPE
TYPE: ACUTE PAIN
TYPE: SURGICAL PAIN

## 2022-07-27 ASSESSMENT — LIFESTYLE VARIABLES
TOTAL SCORE: 0
TOTAL SCORE: 0
HAVE YOU EVER FELT YOU SHOULD CUT DOWN ON YOUR DRINKING: NO
HAVE PEOPLE ANNOYED YOU BY CRITICIZING YOUR DRINKING: NO
EVER FELT BAD OR GUILTY ABOUT YOUR DRINKING: NO
HAVE YOU EVER FELT YOU SHOULD CUT DOWN ON YOUR DRINKING: NO
EVER HAD A DRINK FIRST THING IN THE MORNING TO STEADY YOUR NERVES TO GET RID OF A HANGOVER: NO
TOTAL SCORE: 0
HOW MANY TIMES IN THE PAST YEAR HAVE YOU HAD 5 OR MORE DRINKS IN A DAY: 0
ALCOHOL_USE: NO
TOTAL SCORE: 0
DO YOU DRINK ALCOHOL: NO
HAVE PEOPLE ANNOYED YOU BY CRITICIZING YOUR DRINKING: NO
HAVE YOU EVER FELT YOU SHOULD CUT DOWN ON YOUR DRINKING: NO
TOTAL SCORE: 0
ON A TYPICAL DAY WHEN YOU DRINK ALCOHOL HOW MANY DRINKS DO YOU HAVE: 0
EVER FELT BAD OR GUILTY ABOUT YOUR DRINKING: NO
DO YOU DRINK ALCOHOL: NO
ON A TYPICAL DAY WHEN YOU DRINK ALCOHOL HOW MANY DRINKS DO YOU HAVE: 0
AVERAGE NUMBER OF DAYS PER WEEK YOU HAVE A DRINK CONTAINING ALCOHOL: 0
HOW MANY TIMES IN THE PAST YEAR HAVE YOU HAD 5 OR MORE DRINKS IN A DAY: 0
CONSUMPTION TOTAL: NEGATIVE
AVERAGE NUMBER OF DAYS PER WEEK YOU HAVE A DRINK CONTAINING ALCOHOL: 0
EVER HAD A DRINK FIRST THING IN THE MORNING TO STEADY YOUR NERVES TO GET RID OF A HANGOVER: NO
TOTAL SCORE: 0
CONSUMPTION TOTAL: NEGATIVE

## 2022-07-27 ASSESSMENT — FIBROSIS 4 INDEX
FIB4 SCORE: 0.78
FIB4 SCORE: 0.78

## 2022-07-27 NOTE — OP REPORT
DATE OF SERVICE:  07/27/2022     PREOPERATIVE DIAGNOSIS:  Acute appendicitis and possible tubal pregnancy.     POSTOPERATIVE DIAGNOSIS:  Acute appendicitis and possible tubal pregnancy.     PROCEDURE:  Laparoscopic appendectomy.     SURGEON:  Shaylee Lux MD     ASSISTANT:  Ismael Stevenson MD     ANESTHESIA:  General endotracheal.     ANESTHESIOLOGIST:  Atilio Sandoval DO     INDICATIONS:  The patient is a 32-year-old female who underwent IVF and 5 is   weeks' pregnant.  She has what appeared to be a tubal pregnancy.  She was   brought to the operating room for this.  However, intraoperatively, she was   found by the gynecologist to have acute appendicitis.  I was intraoperatively   consulted and now presenting removing the appendix.     FINDINGS:   Acute appendicitis without evidence of perforation.  An   appendectomy was performed.     DESCRIPTION OF PROCEDURE:  I was contacted while the patient was already   anesthetized and the abdomen was already insufflated. A 5 mm trocar was placed   in right subcostal position.  A 12 mm trocar was carried out through the 11   mm trocar in the suprapubic position.  Appendix was easily identified.  The   mesoappendix was taken down with LigaSure device and then it was amputated   with an Endo-YANA stapler.  It was placed in EndoCatch, brought through the   suprapubic port.  Appendiceal bed was irrigated, hemostasis occurred.  The   procedure was then turned back over to Dr. Stevenson, for which a second   dictation will be provided.        ______________________________  MD KAREN GARCIA/DYLAN/ORQUIDEA    DD:  07/27/2022 14:04  DT:  07/27/2022 15:22    Job#:  [id number]

## 2022-07-27 NOTE — ANESTHESIA PREPROCEDURE EVALUATION
Case: [id number] Date/Time: 07/27/22 1245    Procedures:       SALPINGECTOMY - LAPROSCOPIC - ECTOPIC (Right Abdomen)      PELVISCOPY    Anesthesia type: General    Location: [facility] ROOM 24 / SURGERY SAME DAY [facility]    Surgeons: Ismael Stevenson M.D.        Ectopic Pregnancy  Relevant Problems   No relevant active problems       Physical Exam    Airway   Mallampati: II  TM distance: >3 FB  Neck ROM: full       Cardiovascular - normal exam  Rhythm: regular  Rate: normal  (-) murmur     Dental - normal exam           Pulmonary - normal exam  Breath sounds clear to auscultation     Abdominal    Neurological - normal exam                 Anesthesia Plan    ASA 2- EMERGENT (Ectopic Pregnancy)   ASA physical status emergent criteria: other (comment)    Plan - general       Airway plan will be ETT          Induction: intravenous    Postoperative Plan: Postoperative administration of opioids is intended.    Pertinent diagnostic labs and testing reviewed    Informed Consent:    Anesthetic plan and risks discussed with patient.    Use of blood products discussed with: patient whom consented to blood products.

## 2022-07-27 NOTE — ED NOTES
Med rec updated and complete per med rec tech at [facility]      Med rec completed per patient at bedside, and [facility] in [place], [place] ([phone]) to verify strength and directions of patient's progesterone prescriptions.  Allergies reviewed with patient. NKDA.  No outpatient antibiotics in the last 30 days.  [facility]'s preferred [facility]: [facility] [address].     Per [facility], patient was prescribed progesterone 50 mg/mL oil with directions to inject 1 mL (50 mg) every day; patient states that she was instructed by her doctor to adjust her dose to 1.5 mL (75 mg) every other day.

## 2022-07-27 NOTE — OP REPORT
This version of the note has been redacted during the course of a chart correction case. If you need access to the original text of this version of the note, please contact the Health Information Management department at [phone].

## 2022-07-27 NOTE — ANESTHESIA PROCEDURE NOTES
Airway    Date/Time: 7/27/2022 1:20 PM  Performed by: Atilio Sandoval D.O.  Authorized by: Atilio Sandoval D.O.     Location:  OR  Urgency:  Elective  Indications for Airway Management:  Anesthesia      Spontaneous Ventilation: absent    Sedation Level:  Deep  Preoxygenated: Yes    Patient Position:  Sniffing  Mask Difficulty Assessment:  0 - not attempted  Final Airway Type:  Endotracheal airway  Final Endotracheal Airway:  ETT  Cuffed: Yes    Technique Used for Successful ETT Placement:  Direct laryngoscopy    Insertion Site:  Oral  Blade Type:  Marcela  Laryngoscope Blade/Videolaryngoscope Blade Size:  3  ETT Size (mm):  6.5  Measured from:  Teeth  ETT to Teeth (cm):  22  Placement Verified by: auscultation and capnometry    Cormack-Lehane Classification:  Grade I - full view of glottis  Number of Attempts at Approach:  1

## 2022-07-27 NOTE — OR SURGEON
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    Anesthesiologist/Type of Anesthesia:  Anesthesiologist: Atilio Sandoval D.O./General    Surgical Staff:  Circulator: Gudelia Millard R.N.  Scrub Person: Kemal Syed R.N.    Specimens removed if any:  ID Type Source Tests Collected by Time Destination   A : APPENDIX  Tissue Appendix PATHOLOGY SPECIMEN Ismael Stevenson M.D. 7/27/2022 1355    B : RIGHT FALLOPIAN TUBE  Tissue Fallopian Tube PATHOLOGY SPECIMEN Ismael Stevenson M.D. 7/27/2022 1402        Estimated Blood Loss: <5cc    Findings: Appendix and cecum adherent to anterior abd wall with purulent fluid throughout pelvis. Right tube with small bulge in mid isthmus c/w possible heterotopic. No hemoperitoneum. Bilateral ovaries, liver, GB, stomach all wnl.    Complications: none        7/27/2022 2:41 PM Ismael Stevenson M.D.

## 2022-07-27 NOTE — ED NOTES
Pre-op nurses here to get pt for surgery. All belongings collect, chart provided. Report given to pre-op. Pt transferred to pre-op with monitor in place.

## 2022-07-27 NOTE — ED PROVIDER NOTES
ED Provider Note    CHIEF COMPLAINT  Abdominal pain      HPI  Brunilda Webber is a 32 y.o. female who presents with abdominal pain.  Started 2 days ago.  Located mid and lower right abdomen.  Aching character.  Gradually worsening.  Associated nausea and vomiting.  Has not been able to keep down anything even water over the last 2 days.  Still making urine but has to push hard to get it out and it there is a stinging sensation. No radiation of the back.  No flank pain.  Nothing in particular changes the pain other than worse with movement.   She has had a fever up to 100.  Positive loose stool .  Patient is currently pregnant.  Her last menstrual period was over a year ago.  She had an IUD and has been undergoing IVF with Dr. Wallis.  She had a positive pregnancy test twice at the office.  No confirmatory ultrasound.    REVIEW OF SYSTEMS  As per HPI  All other systems are negative.     PAST MEDICAL HISTORY  Past Medical History:   Diagnosis Date   • Vitamin D deficiency        FAMILY HISTORY  Family History   Problem Relation Age of Onset   • Thyroid Mother    • Cancer Father         skin   • Cancer Maternal Grandmother         breast   • Heart Disease Maternal Grandfather 70        mi   • Cancer Paternal Grandmother         lung in smoker       SOCIAL HISTORY  Social History     Tobacco Use   • Smoking status: Never Smoker   • Smokeless tobacco: Never Used   Vaping Use   • Vaping Use: Never used   Substance Use Topics   • Alcohol use: Not Currently     Alcohol/week: 0.0 oz   • Drug use: No       SURGICAL HISTORY  Past Surgical History:   Procedure Laterality Date   • PRIMARY C SECTION N/A 3/18/2021    Procedure:  SECTION, PRIMARY;  Surgeon: Esperanza Fuentes M.D.;  Location: SURGERY LABOR AND DELIVERY;  Service: Labor and Delivery       CURRENT MEDICATIONS  Home Medications     Reviewed by Carole Wood R.N. (Registered Nurse) on 22 at 0641  Med List Status: Not Addressed   Medication Last Dose  "Status   ibuprofen (MOTRIN) 600 MG Tab  Active   magnesium gluconate (MAG-G) 500 MG tablet  Active   Prenatal MV-Min-Fe Fum-FA-DHA (PRENATAL 1 PO)  Active                ALLERGIES  Allergies   Allergen Reactions   • Nkda [No Known Drug Allergy]    • Onion Rash     Skin rash        PHYSICAL EXAM  VITAL SIGNS: /73   Pulse (!) 102   Temp 37.1 °C (98.7 °F) (Temporal)   Resp 20   Ht 1.702 m (5' 7\")   Wt 84.1 kg (185 lb 6.5 oz)   SpO2 97%   BMI 29.04 kg/m²   Constitutional: Awake and alert.  Appears uncomfortable holding her abdomen.  Tearful.  HENT: Dry mucous membranes  Eyes: Sclera white  Neck: Normal range of motion  Cardiovascular: Mildly elevated heart rate, Normal rhythm  Thorax & Lungs: Normal breath sounds, No respiratory distress, No wheezing, No chest tenderness.   Abdomen: Tender to palpation diffusely with increased tenderness in the right lower quadrant.  Positive guarding  Skin: No rash.   Extremities: Intact, symmetric distal pulses, no edema.  Neurologic: Grossly normal    RADIOLOGY/PROCEDURES  US-OB 1ST TRIMESTER WITH TRANSVAGINAL (COMBO)   Final Result      Very concerning for right extrauterine and intrauterine gestations, the intrauterine gestation measuring 5 weeks 2 days.      Moderate pelvic and right adnexal complicated fluid is concerning for hemorrhage      I discussed the case with Dr. Kraft at 9:24 AM. He confirmed that the patient had in vitro fertilization. This significantly raises the risk for simultaneous intrauterine and extrauterine pregnancy         Imaging is interpreted by radiologist    Labs:   Results for orders placed or performed during the hospital encounter of 07/27/22   CBC WITH DIFFERENTIAL   Result Value Ref Range    WBC 17.5 (H) 4.8 - 10.8 K/uL    RBC 5.06 4.20 - 5.40 M/uL    Hemoglobin 14.5 12.0 - 16.0 g/dL    Hematocrit 42.4 37.0 - 47.0 %    MCV 83.8 81.4 - 97.8 fL    MCH 28.7 27.0 - 33.0 pg    MCHC 34.2 33.6 - 35.0 g/dL    RDW 43.7 35.9 - 50.0 fL    " Platelet Count 232 164 - 446 K/uL    MPV 9.1 9.0 - 12.9 fL    Neutrophils-Polys 82.80 (H) 44.00 - 72.00 %    Lymphocytes 9.70 (L) 22.00 - 41.00 %    Monocytes 4.30 0.00 - 13.40 %    Eosinophils 2.10 0.00 - 6.90 %    Basophils 0.40 0.00 - 1.80 %    Immature Granulocytes 0.70 0.00 - 0.90 %    Nucleated RBC 0.00 /100 WBC    Neutrophils (Absolute) 14.54 (H) 2.00 - 7.15 K/uL    Lymphs (Absolute) 1.70 1.00 - 4.80 K/uL    Monos (Absolute) 0.75 0.00 - 0.85 K/uL    Eos (Absolute) 0.36 0.00 - 0.51 K/uL    Baso (Absolute) 0.07 0.00 - 0.12 K/uL    Immature Granulocytes (abs) 0.12 (H) 0.00 - 0.11 K/uL    NRBC (Absolute) 0.00 K/uL   COMP METABOLIC PANEL   Result Value Ref Range    Sodium 133 (L) 135 - 145 mmol/L    Potassium 5.0 3.6 - 5.5 mmol/L    Chloride 101 96 - 112 mmol/L    Co2 20 20 - 33 mmol/L    Anion Gap 12.0 7.0 - 16.0    Glucose 115 (H) 65 - 99 mg/dL    Bun 9 8 - 22 mg/dL    Creatinine 0.61 0.50 - 1.40 mg/dL    Calcium 8.8 8.4 - 10.2 mg/dL    AST(SGOT) 30 12 - 45 U/L    ALT(SGPT) 25 2 - 50 U/L    Alkaline Phosphatase 68 30 - 99 U/L    Total Bilirubin 0.7 0.1 - 1.5 mg/dL    Albumin 4.0 3.2 - 4.9 g/dL    Total Protein 7.7 6.0 - 8.2 g/dL    Globulin 3.7 (H) 1.9 - 3.5 g/dL    A-G Ratio 1.1 g/dL   URINALYSIS CULTURE, IF INDICATED    Specimen: Urine   Result Value Ref Range    Color Yellow     Character Clear     Specific Gravity 1.025 <1.035    Ph 6.0 5.0 - 8.0    Glucose Negative Negative mg/dL    Ketones >=80 (A) Negative mg/dL    Protein Negative Negative mg/dL    Bilirubin Small (A) Negative    Nitrite Negative Negative    Leukocyte Esterase Negative Negative    Occult Blood Negative Negative    Micro Urine Req see below    RH TYPE FOR RHOGAM FROM E.D.   Result Value Ref Range    Emergency Department Rh Typing POS     Number Of Rh Doses Indicated ZERO    HCG QUANTITATIVE SERUM   Result Value Ref Range    Bhcg 4293.0 (H) 0.0 - 10.0 mIU/mL   ESTIMATED GFR   Result Value Ref Range    GFR (CKD-EPI) 123 >60 mL/min/1.73 m 2        Medications   ondansetron (ZOFRAN) syringe/vial injection 4 mg (has no administration in time range)   morphine 4 MG/ML injection 4 mg (has no administration in time range)   lactated ringers (LR) bolus (has no administration in time range)       Measures:  -Hydration: Patient was given IV fluids because of nausea vomiting.  Oral fluids were not appropriate because of a possible surgical problem.  On recheck the patient was stable    COURSE & MEDICAL DECISION MAKING  Patient presents to the ER with history and physical as above.  History of IVF with lower abdominal pain.  Work-up possible ectopic pregnancy and other surgical process.  Treat pain with morphine and Zofran.  Will hydrate because of decreased oral intake.    Patient is Rh+.  Quantitative hCG 4293.    Pelvic ultrasound returns demonstrating ectopic and IUP.  I spoke with the radiologist about this.  Patient will need to be transferred to [facility] for gynecology availability.  I spoke with Dr. Mc who accepts the case.  Paged Dr. Wallis.  I am told that he is operating today and that the gynecologist on-call for him should be notified.    Discussed findings with the patient.  Ordered additional analgesic.  Patient remains hemodynamically stable.    FINAL IMPRESSION  1.  Ectopic pregnancy with hemorrhage  2.  Intrauterine pregnancy    CRITICAL CARE TIME 35 minutes  There was a very real possibility of deterioration of the patient's condition.  This patient required the highest level of care.  I provided critical care services which included: review of the medical record, treatment orders, ordering and reviewing test results, frequent reevaluation of the patient's condition and response to treatment, as well as discussing the case with appropriate personnel and various consultants. The critical care time associated with the care of this patient is exclusive of any procedures or specific interventions.          This dictation was created  using voice recognition software. The accuracy of the dictation is limited to the abilities of the software.  The nursing notes were reviewed and certain aspects of this information were incorporated into this note.    Electronically signed by: Atilio Kraft M.D., 7/27/2022 6:56 AM

## 2022-07-27 NOTE — ANESTHESIA TIME REPORT
Anesthesia Start and Stop Event Times     Date Time Event    7/27/2022 1248 Ready for Procedure     1315 Anesthesia Start     1437 Anesthesia Stop        Responsible Staff  07/27/22    Name Role Begin End    Atilio Sandoval D.O. Anesth 1315 1437        Overtime Reason:  no overtime (within assigned shift)    Comments:

## 2022-07-27 NOTE — OR NURSING
1434 Patient arrived to PACU from OR. Report received. Patient attached to monitoring. VSS. Patient oxygenating well on 6 L via mask.     1450 Patient tolerating PO liquids without complication.    1515 Patient meets phase two criteria     1757 Report given to Hyun CHOW    1620 Handoff report to April GERALDO.

## 2022-07-27 NOTE — OR NURSING
1620- Report from Marie CHOW, care assumed. Pt resting comfortably in bed. Declines pain and nausea at this time.    1641- 1000mg PO tylenol given for headache    1728- Pt medicated per MAR for pain    1752- Pt transferred to T212 via rney with pt transport. O2 tank 1/2 full. All belongings sent with patient.

## 2022-07-27 NOTE — ED TRIAGE NOTES
"Chief Complaint   Patient presents with   • Abdominal Pain     Pt transferred from [facility] for R ovarian and heterotopic pregnancy. Pt went through IVF and is 5 weeks pregnant. Pt presents with 9/10 R lower abdominal pain.        Pt BIBA as a transfer from [facility] for above complaint. This is the second time pt has received IVF and has no history of miscarriages. Pt hasn't had anything to drink since this 3 am this morning, pt had a sip of water in which she threw up. Pt A+Ox4, GCS 15.     EMS and SM gave a total of 8 mg of morphine prior to arrival.     /62   Pulse 95   Temp 37.6 °C (99.7 °F) (Temporal)   Resp 18   Ht 1.702 m (5' 7\")   Wt 81.6 kg (180 lb)   SpO2 96%       "

## 2022-07-27 NOTE — ED NOTES
Started NS bolus per MAR. Pt reports pain has decreased to a 4/10. Call light in reach, will continue to monitor.

## 2022-07-27 NOTE — ED PROVIDER NOTES
ED Provider Note    ED Provider Note    Primary care provider: CURRY Bartholomew  Means of arrival: EMS  History obtained from: Patient    CHIEF COMPLAINT  Chief Complaint   Patient presents with   • Abdominal Pain     Pt transferred from [facility] for R ovarian and heterotopic pregnancy. Pt went through IVF and is 5 weeks pregnant. Pt presents with 9/10 R lower abdominal pain.      Seen at 10:53 AM.   HPI  Brunilda Webber is a 32 y.o. female -0-0-1 EGA 5 weeks, currently engaged in IVF by Dr. Gilbert who presents to the Emergency Department likely heterotopic pregnancy.  The patient developed gradual onset of now severe right lower quadrant abdominal pain.  She initially thought she had food poisoning.  Symptoms began 72 hours ago with intractable vomiting, diffuse abdominal pain.  Now more focal to the right lower quadrant, worse with moving.  Slightly improved with knees in a flexed position.    She is unaware of any fevers or chills.  She did have 1 loose bowel movement today.  She has not had any vaginal discharge.  Only abdominal surgery in the past was  section and also liposuction.    She has seen Dr. Stevenson from GYN as well as Dr. Gilbert.       REVIEW OF SYSTEMS  See HPI,   Remainder of ROS negative.     PAST MEDICAL HISTORY   has a past medical history of Vitamin D deficiency.    SURGICAL HISTORY   has a past surgical history that includes primary c section (N/A, 3/18/2021).    SOCIAL HISTORY  Social History     Tobacco Use   • Smoking status: Never Smoker   • Smokeless tobacco: Never Used   Vaping Use   • Vaping Use: Never used   Substance Use Topics   • Alcohol use: Not Currently     Alcohol/week: 0.0 oz   • Drug use: No      Social History     Substance and Sexual Activity   Drug Use No       FAMILY HISTORY  Family History   Problem Relation Age of Onset   • Thyroid Mother    • Cancer Father         skin   • Cancer Maternal Grandmother         breast   • Heart Disease Maternal  "Grandfather 70        mi   • Cancer Paternal Grandmother         lung in smoker       CURRENT MEDICATIONS  Reviewed.  See Encounter Summary.     ALLERGIES  Allergies   Allergen Reactions   • Onion Hives and Swelling     Patient states \"throat porter closes\"       PHYSICAL EXAM  VITAL SIGNS: /82   Pulse 84   Temp 37.6 °C (99.7 °F) (Temporal)   Resp 18   Ht 1.702 m (5' 7\")   Wt 81.6 kg (180 lb)   SpO2 97%   BMI 28.19 kg/m²   Constitutional: Awake, alert in no apparent distress.  HENT: Normocephalic, Bilateral external ears normal. Nose normal.   Eyes: Conjunctiva normal, non-icteric, EOMI.    Thorax & Lungs: Easy unlabored respirations, Clear to ascultation bilaterally.  Cardiovascular: Borderline tachycardic, regular rate, Regular rhythm, No murmurs, rubs or gallops. Bilateral pulses symmetrical.   Abdomen:  Soft, diffuse abdominal tenderness with right lower quadrant predominance, voluntary guarding, nondistended, normal active bowel sounds.   :    Skin: Visualized skin is  Dry, No erythema, No rash.   Musculoskeletal:   No cyanosis, clubbing or edema. No leg asymmetry.   Neurologic: Alert, Grossly non-focal.   Psychiatric: Normal affect, Normal mood  Lymphatic:  No cervical LAD        RADIOLOGY  No orders to display         COURSE & MEDICAL DECISION MAKING  Pertinent Labs & Imaging studies reviewed. (See chart for details)    Differential diagnoses include but are not limited to: Concerning for ectopic pregnancy    10:53 AM - Medical record reviewed, patient seen and examined at bedside.  Dr. Stevenson or Dr. Gilbert will be paged at this time.       Decision Making:  This is a pleasant 32 y.o. year old female who presents with right lower quadrant abdominal pain.  Unfortunately the patient appears to have heterotopic pregnancy.  She is Rh+, no indication for RhoGAM.  She is hemodynamically stable at this time.  There is moderate free fluid in the pelvis.  Patient was given adequate analgesia in the " emergency department.  I discussed the case with Dr. Stevenson.  He evaluated the patient and determined the need for laparoscopy.  Patient updated with the plan of care.  Eventual disposition pending.  In all likelihood she will be observed overnight.        FINAL IMPRESSION  1. Right ovarian pregnancy with intrauterine pregnancy

## 2022-07-27 NOTE — ED TRIAGE NOTES
"Chief Complaint   Patient presents with   • Abdominal Pain     33 y/o female presents with a chief complaint of abdominal pain, nausea, vomiting x2 days. Per patient she has not been able to keep down any fluids for the past two days. Patient stated that he pain is on the right side and denied any known aggravating or relieving factors. Patient stated that she has also been having intermittent fevers and chills. Patient stated that this AM she had one episode of diarrhea. Patient stated that she is currently 4 weeks pregnant.    • Nausea   • Vomiting   • Diarrhea   • Fever     /73   Pulse (!) 102   Temp 37.1 °C (98.7 °F) (Temporal)   Resp 20   Ht 1.702 m (5' 7\")   Wt 84.1 kg (185 lb 6.5 oz)   SpO2 97%   BMI 29.04 kg/m²     "

## 2022-07-27 NOTE — ED NOTES
Med rec completed per patient at bedside, and [facility] in [place], [place] ([phone]) to verify strength and directions of patient's progesterone prescriptions.  Allergies reviewed with patient. NKDA.  No outpatient antibiotics in the last 30 days.  Patient's preferred pharmacy: [facility] on [address]    Per [facility], patient was prescribed progesterone 50 mg/mL oil with directions to inject 1 mL (50 mg) every day; patient states that she was instructed by her doctor to adjust her dose to 1.5 mL (75 mg) every other day.

## 2022-07-28 VITALS
RESPIRATION RATE: 18 BRPM | BODY MASS INDEX: 30.1 KG/M2 | TEMPERATURE: 97.4 F | DIASTOLIC BLOOD PRESSURE: 56 MMHG | SYSTOLIC BLOOD PRESSURE: 110 MMHG | HEIGHT: 67 IN | HEART RATE: 67 BPM | OXYGEN SATURATION: 96 % | WEIGHT: 191.8 LBS

## 2022-07-28 LAB
ANION GAP SERPL CALC-SCNC: 9 MMOL/L (ref 7–16)
BASOPHILS # BLD AUTO: 0.2 % (ref 0–1.8)
BASOPHILS # BLD: 0.03 K/UL (ref 0–0.12)
BUN SERPL-MCNC: 5 MG/DL (ref 8–22)
CALCIUM SERPL-MCNC: 7.9 MG/DL (ref 8.5–10.5)
CHLORIDE SERPL-SCNC: 107 MMOL/L (ref 96–112)
CO2 SERPL-SCNC: 21 MMOL/L (ref 20–33)
CREAT SERPL-MCNC: 0.63 MG/DL (ref 0.5–1.4)
EOSINOPHIL # BLD AUTO: 0 K/UL (ref 0–0.51)
EOSINOPHIL NFR BLD: 0 % (ref 0–6.9)
ERYTHROCYTE [DISTWIDTH] IN BLOOD BY AUTOMATED COUNT: 44.4 FL (ref 35.9–50)
GFR SERPLBLD CREATININE-BSD FMLA CKD-EPI: 122 ML/MIN/1.73 M 2
GLUCOSE SERPL-MCNC: 158 MG/DL (ref 65–99)
HCT VFR BLD AUTO: 34.6 % (ref 37–47)
HGB BLD-MCNC: 11.7 G/DL (ref 12–16)
IMM GRANULOCYTES # BLD AUTO: 0.1 K/UL (ref 0–0.11)
IMM GRANULOCYTES NFR BLD AUTO: 0.6 % (ref 0–0.9)
LYMPHOCYTES # BLD AUTO: 1.23 K/UL (ref 1–4.8)
LYMPHOCYTES NFR BLD: 7.8 % (ref 22–41)
MCH RBC QN AUTO: 28.3 PG (ref 27–33)
MCHC RBC AUTO-ENTMCNC: 33.8 G/DL (ref 33.6–35)
MCV RBC AUTO: 83.8 FL (ref 81.4–97.8)
MONOCYTES # BLD AUTO: 0.82 K/UL (ref 0–0.85)
MONOCYTES NFR BLD AUTO: 5.2 % (ref 0–13.4)
NEUTROPHILS # BLD AUTO: 13.52 K/UL (ref 2–7.15)
NEUTROPHILS NFR BLD: 86.2 % (ref 44–72)
NRBC # BLD AUTO: 0 K/UL
NRBC BLD-RTO: 0 /100 WBC
PLATELET # BLD AUTO: 182 K/UL (ref 164–446)
PMV BLD AUTO: 9.3 FL (ref 9–12.9)
POTASSIUM SERPL-SCNC: 3.9 MMOL/L (ref 3.6–5.5)
RBC # BLD AUTO: 4.13 M/UL (ref 4.2–5.4)
SODIUM SERPL-SCNC: 137 MMOL/L (ref 135–145)
WBC # BLD AUTO: 15.7 K/UL (ref 4.8–10.8)

## 2022-07-28 PROCEDURE — G0378 HOSPITAL OBSERVATION PER HR: HCPCS

## 2022-07-28 PROCEDURE — 700102 HCHG RX REV CODE 250 W/ 637 OVERRIDE(OP): Performed by: OBSTETRICS & GYNECOLOGY

## 2022-07-28 PROCEDURE — 96376 TX/PRO/DX INJ SAME DRUG ADON: CPT

## 2022-07-28 PROCEDURE — 80048 BASIC METABOLIC PNL TOTAL CA: CPT

## 2022-07-28 PROCEDURE — 700111 HCHG RX REV CODE 636 W/ 250 OVERRIDE (IP): Performed by: OBSTETRICS & GYNECOLOGY

## 2022-07-28 PROCEDURE — 85025 COMPLETE CBC W/AUTO DIFF WBC: CPT

## 2022-07-28 PROCEDURE — 96375 TX/PRO/DX INJ NEW DRUG ADDON: CPT

## 2022-07-28 PROCEDURE — 99024 POSTOP FOLLOW-UP VISIT: CPT | Performed by: SURGERY

## 2022-07-28 PROCEDURE — A9270 NON-COVERED ITEM OR SERVICE: HCPCS | Performed by: OBSTETRICS & GYNECOLOGY

## 2022-07-28 RX ORDER — AMOXICILLIN AND CLAVULANATE POTASSIUM 500; 125 MG/1; MG/1
1 TABLET, FILM COATED ORAL EVERY 8 HOURS
Qty: 30 TABLET | Refills: 0 | Status: SHIPPED | OUTPATIENT
Start: 2022-07-28 | End: 2022-08-21

## 2022-07-28 RX ORDER — OXYCODONE HYDROCHLORIDE 5 MG/1
2.5 TABLET ORAL
Qty: 15 TABLET | Refills: 0 | Status: SHIPPED | OUTPATIENT
Start: 2022-07-28 | End: 2022-08-02

## 2022-07-28 RX ORDER — CEFOTETAN DISODIUM 1 G/10ML
2 INJECTION, POWDER, FOR SOLUTION INTRAMUSCULAR; INTRAVENOUS 2 TIMES DAILY
Status: DISCONTINUED | OUTPATIENT
Start: 2022-07-28 | End: 2022-07-28

## 2022-07-28 RX ORDER — AMOXICILLIN AND CLAVULANATE POTASSIUM 500; 125 MG/1; MG/1
1 TABLET, FILM COATED ORAL EVERY 8 HOURS
Status: DISCONTINUED | OUTPATIENT
Start: 2022-07-28 | End: 2022-07-28 | Stop reason: HOSPADM

## 2022-07-28 RX ADMIN — ACETAMINOPHEN 1000 MG: 500 TABLET, FILM COATED ORAL at 04:37

## 2022-07-28 RX ADMIN — ACETAMINOPHEN 1000 MG: 500 TABLET, FILM COATED ORAL at 00:12

## 2022-07-28 RX ADMIN — ACETAMINOPHEN 1000 MG: 500 TABLET, FILM COATED ORAL at 11:34

## 2022-07-28 RX ADMIN — ONDANSETRON 4 MG: 2 INJECTION INTRAMUSCULAR; INTRAVENOUS at 00:12

## 2022-07-28 RX ADMIN — CEFTRIAXONE SODIUM 2 G: 10 INJECTION, POWDER, FOR SOLUTION INTRAVENOUS at 08:40

## 2022-07-28 RX ADMIN — METRONIDAZOLE 500 MG: 500 TABLET ORAL at 04:37

## 2022-07-28 RX ADMIN — OXYCODONE 5 MG: 5 TABLET ORAL at 03:24

## 2022-07-28 RX ADMIN — OXYCODONE 5 MG: 5 TABLET ORAL at 00:13

## 2022-07-28 ASSESSMENT — PAIN DESCRIPTION - PAIN TYPE
TYPE: ACUTE PAIN

## 2022-07-28 ASSESSMENT — PAIN SCALES - GENERAL: PAIN_LEVEL: 6

## 2022-07-28 NOTE — CARE PLAN
The patient is Stable - Low risk of patient condition declining or worsening    Shift Goals  Clinical Goals: pain control, monitor  Patient Goals: DC in AM  Family Goals: not present    Progress made toward(s) clinical / shift goals:    Problem: Pain - Standard  Goal: Alleviation of pain or a reduction in pain to the patient’s comfort goal  Outcome: Progressing     Problem: Knowledge Deficit - Standard  Goal: Patient and family/care givers will demonstrate understanding of plan of care, disease process/condition, diagnostic tests and medications  Outcome: Progressing       Patient is not progressing towards the following goals:

## 2022-07-28 NOTE — ANESTHESIA POSTPROCEDURE EVALUATION
Patient: Brunilda Webber    Procedure Summary     Date: 07/27/22 Room / Location: [facility] ROOM 24 / SURGERY SAME DAY [facility]    Anesthesia Start: 1315 Anesthesia Stop: 1437    Procedures:       SALPINGECTOMY - LAPROSCOPIC - ECTOPIC (Right Abdomen)      PELVISCOPY (N/A Abdomen)      APPENDECTOMY, LAPAROSCOPIC (N/A Abdomen) Diagnosis: (APPENDICITIS, RIGHT SIDED SUSPECTED ECTOPIC PREGNANCY)    Surgeons: Ismael Stevenson M.D. Responsible Provider: Atilio Sandoval D.O.    Anesthesia Type: general ASA Status: 2 - Emergent          Final Anesthesia Type: general  Last vitals  BP   Blood Pressure: 110/56    Temp   36.3 °C (97.4 °F)    Pulse   67   Resp   18    SpO2   96 %      Anesthesia Post Evaluation    Patient location during evaluation: PACU  Patient participation: complete - patient participated  Level of consciousness: awake and alert  Pain score: 6    Airway patency: patent  Anesthetic complications: no  Cardiovascular status: hemodynamically stable  Respiratory status: acceptable  Hydration status: euvolemic    PONV: none          No complications documented.     Nurse Pain Score: 7 (NPRS)

## 2022-07-28 NOTE — OP REPORT
DATE OF SERVICE:  07/27/2022     PREOPERATIVE DIAGNOSIS:  Intrauterine pregnancy with suspected right tubal   heterotopic pregnancy with abdominal pain, nausea, vomiting, diarrhea and   fever.     POSTOPERATIVE DIAGNOSES:  1.  Acute appendicitis.  2.  Possible right tubal heterotopic pregnancy.     PROCEDURES:  1.  Laparoscopy with right salpingectomy.  2.  Appendectomy performed by Dr. Shaylee Lux.  3.  Irrigation of the pelvis.     SURGEON:  Ismael Stevenson MD     INTRAOPERATIVE CONSULT:  Shaylee Lux MD     ANESTHESIOLOGIST:  Atilio Sandoval DO     COMPLICATIONS:  None.     ESTIMATED BLOOD LOSS:  Less than 5 mL.     PATHOLOGY SPECIMENS:  1.  Appendix.  2.  Right fallopian tube.     INDICATION FOR PROCEDURE:  This is a 32-year-old female patient of Dr. Andres Gilbert who presented to [facility] with complaints of abdominal pain   along with nausea and vomiting, some slight diarrhea and fever for the last   2-3 days, which was getting progressively worse.  She had laboratory work and   ultrasound performed at [facility], which she did show 17.5 thousand   white count, hematocrit was 42.4, platelets 232 and absolute neutrophils was   14.5.  Her urinalysis was negative.  Complete metabolic panel with no   significant findings, Rh positive and beta hCG of 4293. The patient had   undergone a double embryo transfer on 07/07/2022.  Ultrasound findings   demonstrated a single intrauterine pregnancy approximately 5 weeks 2 days with   a yolk sac.  There was also a moderate amount of complex fluid within the   pelvis and around the right ovary.  Adjacent to the right ovary was a cystic   mass that appears to have a yolk sac present within it and therefore, the   suspicion of a heterotopic pregnancy.  This appeared to be approximately the   same size as the intrauterine pregnancy and did appear on imaging to have   gestational sac with yolk sac present.  Therefore, the decision was made to    proceed to the operating room for suspected right tubal heterotopic pregnancy.    Findings upon entry into the abdomen and pelvis, the cecum and appendix were   stuck to the anterior abdominal wall in the midline.  They are rigid with   A rhiny coat to it.  There was couple of very small specks of   suspected fecal material.  This structure of the cecum and appendix were  bluntly dissected off the anterior abdominal wall.  There was purulent   material all throughout the pelvis and the right side of the abdomen, reaching   up towards the liver.  At this point, we called for intraoperative   consultation.  Dr. Shaylee Lux arrived, she agreed with likely appendicitis   and therefore proceeded to perform her portion of the procedure, which is   the appendectomy and right upper quadrant port placement.  See separate   dictation for her part of the procedure.     OTHER FINDINGS:  Once the appendix was removed as inspecting the pelvis, the   uterus, anterior and posterior cul-de-sacs were normal.  Both ovaries were   normal.  The left tube was normal in appearance and followed out to its   fimbriated end, which appeared normal.  The right tube was followed out to its   fimbriated end and the fimbriated end did appear normal but the mid isthmic   section did have a bulge in it consistent with a possible heterotopic   pregnancy.  There was no blood in the pelvis or bleeding from the end of the   tube.  Due to the fact that the patient was getting pregnant with frozen embryo   transfers, not utilizing the fallopian tube, and complication of the   appendicitis found on the procedure today, the decision was made to continue with   removal of the right fallopian tube as planned and this was described below.    Once it was removed, the stump was hemostatic.  The rest of the pelvis looked   otherwise normal with the exception of the appendicitis findings, the liver   edge, the gallbladder, the stomach were all normal as  well.     DESCRIPTION OF PROCEDURE:  The patient was counseled and consented.  She was advised that the removal of her tube would not affect her ability to get pregnant with frozen embryo transfers and if her left tube was normal she could still achieve pregnancy with a single tube present. She was taken   to the operating room where general anesthesia was introduced.  Her airway was   secured with an endotracheal tube.  She was placed in the supine position and   a timeout was taken, it was noted to be correct; therefore, we proceeded with   the procedure.  The patient was then catheterized, prepped and draped in the   usual sterile fashion.       The infraumbilical fold was infiltrated with 0.25% Marcaine with epinephrine   as well as a second site chosen at the midline, two fingerbreadths above the   symphysis pubis in her old  scar.  Infraumbilical incision was made.    The umbilicus was grasped with towel clips and tented up and the Veress step   needle was then placed without difficulty.  Hanging drop technique was used to   confirm its position.  Opening pressure of the low flow CO2 was negative too.    Once an adequate pneumoperitoneum was accomplished, then the 11 mm trocar   was then placed without difficulty.  Its intraperitoneal position was   confirmed.  The underlying structures were inspected and there was no injury   noted upon inspection to the lower pelvis.  It was immediately noted that   there was purulent fluid all within the pelvis and the cecum and appendix were   stuck to the anterior abdominal wall in the midline.  Therefore, the   suprapubic port site was shifted to the left of midline in order to avoid the   adhesed structure.  The second 11 mm port was then placed under direct   visualization and no injury occurred.  Blunt dissection was used to remove the   tissue off of the anterior abdominal wall.  This appeared to be therefore   related to the appendix.  At this point, the  intraoperative consultation was   requested.  Dr. Shaylee Lux arrived within 10 minutes, she placed the right   upper quadrant port and then proceeded to remove the appendix using the   LigaSure followed by the stapler.  At the conclusion, it was a recommendation   to admit the patient to the hospital and put her on cefotetan and Flagyl   Antibiotics. My procedure could now resume.     The pelvis was thoroughly inspected.  Findings are noted above.  The right   fimbriated end of the fallopian tube was grasped with graspers and the   LigaSure was then used and small sequential bites underneath the mesosalpinx   going to the proximal fallopian tube where it was then transected in its   entirety and removed without difficulty through the suprapubic port.  The base   of the resection was hemostatic; therefore, the pelvis and abdomen were all   thoroughly irrigated with 3 liters of saline and the patient was then taken   out of Trendelenburg and put in reverse Trendelenburg to continue with irrigation.  Once this was performed, the stump of the appendix was   inspected once again, there was no bleeding.  Therefore, the right upper   quadrant port was removed under direct visualization.  There was no bleeding.    The suprapubic port was removed under direct visualization.  There was no   bleeding and then the umbilical port was removed without difficulty.  The   pneumoperitoneum was evacuated.  The suprapubic and umbilical ports were   closed with 0 Vicryl on the fascial suture followed by 4-0 Vicryl subcuticular   stitches and Steri-Strips and Mastisol and Tegaderm.  The right upper   quadrant port simply needed subcuticular stitches, Steri-Strips,   Mastisol and Tegaderm.  The patient was then aroused from anesthesia,   extubated and taken to recovery room in stable condition.        ______________________________  MD ASUNCION KOO/PARUL    DD:  07/27/2022 16:24  DT:  07/27/2022 19:50    Job#:  [id number]

## 2022-07-28 NOTE — PROGRESS NOTES
Assessment completed.  Pt A&Ox4.  Pt denies any pain or nausea at this time. x3 lap sites present, dressing CDI. Pt tolerated full liquid breakfast meal, requesting diet to be advanced to Regular, physician notified.  Pt urinating without any difficulties, reports + flatus.  POC discussed; communication board updated.    Bed in locked, lowest position.  Call light and belongings within reach.

## 2022-07-28 NOTE — CARE PLAN
The patient is Stable - Low risk of patient condition declining or worsening    Shift Goals  Clinical Goals: Monitor shultz output, pain contro  Patient Goals: Rest  Family Goals: not present    Progress made toward(s) clinical / shift goals:      Problem: Knowledge Deficit - Standard  Goal: Patient and family/care givers will demonstrate understanding of plan of care, disease process/condition, diagnostic tests and medications  Outcome: Progressing     Problem: Pain - Standard  Goal: Alleviation of pain or a reduction in pain to the patient’s comfort goal  Outcome: Progressing    Patient is not progressing towards the following goals:

## 2022-07-28 NOTE — PROGRESS NOTES
Assessment completed. Patient is aox4, vital signs stable. Patient reported 2/10 pain to abdomen, declined any pain medications at this time. Surgical dressings to abdomen CDI.  Patient's shultz cathter draining clear yellow urine. Patient updated on plan of care, all needs met at this time. Threaded socks in place. Bed locked and in lowest position. Call light and personal belongings within reach.

## 2022-07-28 NOTE — CONSULTS
DATE OF SERVICE:  2022     INTRAOPERATIVE SURGICAL CONSULTATION     PHYSICIAN REQUESTING CONSULTATION:  Ismael Stevenson MD     REASON FOR CONSULTATION:  The patient is a 32-year-old female who underwent   through IVF and is 5 weeks pregnant.  She started developing abdominal pain   and it was felt that she most likely had a right-sided tubal pregnancy.  She   had an ultrasound, which was concerning for this.  She subsequently was   transferred from [facility] for evaluation.  She was seen by Dr. Stevenson and was taken to the operating room by Dr. Stevenson for evaluation of a   tubal pregnancy.  Intraoperatively, the patient was found to have a probable   acute appendicitis.  I have been asked to see her intraoperatively for this.     PAST MEDICAL HISTORY AND ILLNESSES:  History of vitamin D deficiency.     PAST SURGICAL HISTORY:   as well as IVF.     MEDICATIONS:  Currently are none.     ALLERGIES:  TO ONION.     PHYSICAL EXAM:  GENERAL:  She is intubated.  ABDOMEN:  Prepped.  She has obvious acute appendicitis with questionable   perforation.     IMPRESSION:  A 32-year-old female with acute appendicitis, possible perforated   appendicitis.     PLAN:  For her to undergo laparoscopic appendectomy.        ______________________________  MD KAREN GARCIA/PARUL    DD:  2022 14:03  DT:  2022 18:50    Job#:  [id number]

## 2022-07-28 NOTE — DISCHARGE INSTRUCTIONS
Post Operative Discharge Instructions:    1. DIET: Upon discharge from the hospital you may resume your normal preoperative diet. Depending on how you are feeling and whether you have nausea or not, you may wish to stay with a bland diet for the first few days. However, you can advance this as quickly as you feel ready.    2. ACTIVITIES: After discharge from the hospital, you may resume full routine activities. However, there should be no heavy lifting (greater than 10 pounds) and no strenuous activities until after your follow-up visit. Otherwise, routine activities of daily living are acceptable.    3. DRIVING: You may drive whenever you are off pain medications and are able to perform the activities needed to drive, i.e. turning, bending, twisting, etc.    4. BATHING: You may get the wound wet at any time after leaving the hospital. You may shower, but do not submerge in a bath for at least two weeks. If you have wound dressings, they may come off after 48 hours. If you have skin glue to the wound, this will fall off on its own, do not pick at it. If you have steri strips to the wound, these will fall off on their own, do not pick at them, may trim the edges if needed.    5. BOWEL FUNCTION: A few patients, after this operation, will develop either frequent or loose stools after meals. This usually corrects itself after a few days, to a few weeks. If this occurs, do not worry; it is not unusual and will resolve. Much more common than loose stools, is constipation. The combination of pain medication and decreased activity level can cause constipation in otherwise normal patients. If you feel this is occurring, take a laxative (Milk of Magnesia, Ex-Lax, Senokot, etc.) until the problem has resolved.    6. PAIN MEDICATION: You will be given a prescription for pain medication at discharge. Please take these as directed. It is important to remember not to take medications on an empty stomach as this may cause  nausea. You may also take over the counter acetaminophen and/or NSAIDS (ibuprofen, Aleve, Advil, Motrin) per the package instructions.     7.CALL IF YOU HAVE: (1) Fevers to more than 101F, (2) Unusual chest or leg pain, (3) Drainage or fluid from incision that may be foul smelling, increased tenderness or soreness at the wound or the wound edges are no longer together, redness or swelling at the incision site. Please do not hesitate to call with any other questions.        Discharge Instructions    Discharged to home by car with relative. Discharged via wheelchair, hospital escort: Yes.  Special equipment needed: Not Applicable    Be sure to schedule a follow-up appointment with your primary care doctor or any specialists as instructed.     Discharge Plan:   Diet Plan: Discussed  Activity Level: Discussed  Confirmed Follow up Appointment: Patient to Call and Schedule Appointment  Confirmed Symptoms Management: Discussed  Medication Reconciliation Updated: Yes    I understand that a diet low in cholesterol, fat, and sodium is recommended for good health. Unless I have been given specific instructions below for another diet, I accept this instruction as my diet prescription.   Other diet: Heart healthy     Special Instructions: None    -Is this patient being discharged with medication to prevent blood clots?  No    Is patient discharged on Warfarin / Coumadin?   No

## 2022-07-28 NOTE — CARE PLAN
The patient is Stable - Low risk of patient condition declining or worsening    Shift Goals  Clinical Goals: urinate, ambulate, pain control  Patient Goals: discharge  Family Goals: not present    Progress made toward(s) clinical / shift goals:    Problem: Pain - Standard  Goal: Alleviation of pain or a reduction in pain to the patient’s comfort goal  Outcome: Progressing     Problem: Knowledge Deficit - Standard  Goal: Patient and family/care givers will demonstrate understanding of plan of care, disease process/condition, diagnostic tests and medications  Outcome: Progressing

## 2022-07-28 NOTE — PROGRESS NOTES
"    DATE: 7/28/2022    Post Operative Day  1 laparoscopic appendectomy.    INTERVAL EVENTS:  Doing well. Afebrile. Tolerating diet. Ok to switch to PO abx and DC home.    PHYSICAL EXAMINATION:  Vital Signs: /56   Pulse 67   Temp 36.3 °C (97.4 °F) (Temporal)   Resp 18   Ht 1.702 m (5' 7\")   Wt 87 kg (191 lb 12.8 oz)   SpO2 96%     Alert  Abd soft - Bandages dry    LABORATORY VALUES:   Recent Labs     07/27/22 0722 07/28/22 0217   WBC 17.5* 15.7*   RBC 5.06 4.13*   HEMOGLOBIN 14.5 11.7*   HEMATOCRIT 42.4 34.6*   MCV 83.8 83.8   MCH 28.7 28.3   MCHC 34.2 33.8   RDW 43.7 44.4   PLATELETCT 232 182   MPV 9.1 9.3     Recent Labs     07/27/22 0722 07/28/22 0217   SODIUM 133* 137   POTASSIUM 5.0 3.9   CHLORIDE 101 107   CO2 20 21   GLUCOSE 115* 158*   BUN 9 5*   CREATININE 0.61 0.63   CALCIUM 8.8 7.9*     Recent Labs     07/27/22 0722   ASTSGOT 30   ALTSGPT 25   TBILIRUBIN 0.7   ALKPHOSPHAT 68   GLOBULIN 3.7*            IMAGING:   No orders to display       ASSESSMENT AND PLAN:   * Appendicitis with peritonitis- (present on admission)  Assessment & Plan  ER visit with poss tubal pregnancy  Intraop found acute appy ? perf  IV abx  Lap appy           ____________________________________     Shaylee Lux M.D.    DD: 7/28/2022  11:06 AM  "

## 2022-07-29 NOTE — PROGRESS NOTES
IV dc'd.  Discharge instructions given to patient; patient verbalizes understanding, all questions answered.  Copy of DC summary provided, signed copy in chart.  2 prescriptions electronically sent to pt's pharmacy. Pt states personal belongings are in possession. Pt escorted off unit by this RN without incident.

## 2022-08-21 ENCOUNTER — OFFICE VISIT (OUTPATIENT)
Dept: URGENT CARE | Facility: CLINIC | Age: 32
End: 2022-08-21
Payer: COMMERCIAL

## 2022-08-21 ENCOUNTER — HOSPITAL ENCOUNTER (OUTPATIENT)
Facility: MEDICAL CENTER | Age: 32
End: 2022-08-21
Attending: PHYSICIAN ASSISTANT
Payer: COMMERCIAL

## 2022-08-21 ENCOUNTER — APPOINTMENT (OUTPATIENT)
Dept: RADIOLOGY | Facility: IMAGING CENTER | Age: 32
End: 2022-08-21
Attending: PHYSICIAN ASSISTANT
Payer: COMMERCIAL

## 2022-08-21 VITALS
OXYGEN SATURATION: 98 % | DIASTOLIC BLOOD PRESSURE: 60 MMHG | RESPIRATION RATE: 18 BRPM | HEIGHT: 67 IN | BODY MASS INDEX: 28.72 KG/M2 | WEIGHT: 183 LBS | SYSTOLIC BLOOD PRESSURE: 120 MMHG | HEART RATE: 123 BPM | TEMPERATURE: 100.8 F

## 2022-08-21 DIAGNOSIS — R50.9 FEVER, UNSPECIFIED FEVER CAUSE: ICD-10-CM

## 2022-08-21 DIAGNOSIS — N39.0 COMPLICATED UTI (URINARY TRACT INFECTION): ICD-10-CM

## 2022-08-21 LAB
APPEARANCE UR: CLEAR
BILIRUB UR STRIP-MCNC: NORMAL MG/DL
COLOR UR AUTO: NORMAL
EXTERNAL QUALITY CONTROL: NORMAL
FLUAV+FLUBV AG SPEC QL IA: NEGATIVE
GLUCOSE UR STRIP.AUTO-MCNC: NEGATIVE MG/DL
INT CON NEG: NEGATIVE
INT CON POS: POSITIVE
KETONES UR STRIP.AUTO-MCNC: 160 MG/DL
LEUKOCYTE ESTERASE UR QL STRIP.AUTO: NORMAL
NITRITE UR QL STRIP.AUTO: NEGATIVE
PH UR STRIP.AUTO: 6 [PH] (ref 5–8)
PROT UR QL STRIP: 100 MG/DL
RBC UR QL AUTO: NORMAL
SARS-COV+SARS-COV-2 AG RESP QL IA.RAPID: NEGATIVE
SP GR UR STRIP.AUTO: 1.02
UROBILINOGEN UR STRIP-MCNC: 0.2 MG/DL

## 2022-08-21 PROCEDURE — 71046 X-RAY EXAM CHEST 2 VIEWS: CPT | Mod: TC | Performed by: PHYSICIAN ASSISTANT

## 2022-08-21 PROCEDURE — 87426 SARSCOV CORONAVIRUS AG IA: CPT | Performed by: PHYSICIAN ASSISTANT

## 2022-08-21 PROCEDURE — 81002 URINALYSIS NONAUTO W/O SCOPE: CPT | Performed by: PHYSICIAN ASSISTANT

## 2022-08-21 PROCEDURE — 99214 OFFICE O/P EST MOD 30 MIN: CPT | Performed by: PHYSICIAN ASSISTANT

## 2022-08-21 PROCEDURE — 87086 URINE CULTURE/COLONY COUNT: CPT

## 2022-08-21 PROCEDURE — 87804 INFLUENZA ASSAY W/OPTIC: CPT | Performed by: PHYSICIAN ASSISTANT

## 2022-08-21 RX ORDER — CIPROFLOXACIN 500 MG/1
500 TABLET, FILM COATED ORAL 2 TIMES DAILY
Qty: 14 TABLET | Refills: 0 | Status: ON HOLD | OUTPATIENT
Start: 2022-08-21 | End: 2022-09-01

## 2022-08-21 ASSESSMENT — ENCOUNTER SYMPTOMS
COUGH: 0
CONSTIPATION: 1
FEVER: 1
NAUSEA: 0
DIARRHEA: 0
CHILLS: 1
VOMITING: 0
DIZZINESS: 0
MYALGIAS: 1
SORE THROAT: 0
HEADACHES: 0
SHORTNESS OF BREATH: 0
BLOOD IN STOOL: 0
FLANK PAIN: 0
ABDOMINAL PAIN: 1

## 2022-08-21 ASSESSMENT — FIBROSIS 4 INDEX: FIB4 SCORE: 0.99

## 2022-08-21 NOTE — PROGRESS NOTES
Subjective     Brunilda Webebr is a 32 y.o. female who presents with Body Aches (X5days, Body aches, fatigue, sweats, had surgery 2 wks ago, fever, chills)      HPI:  Patient had appendectomy and salpingectomy on 7/27/2022 for right ectopic pregnancy.  States that she was feeling fine until about 5 days ago when she started to develop body aches, chills, and fatigue.  She states that the symptoms of gotten a little bit worse and then today she has developed fever, lower abdominal pain/pressure, and pain with urinating.  She denies any URI symptoms.      Review of Systems   Constitutional:  Positive for chills, fever and malaise/fatigue.   HENT:  Negative for congestion and sore throat.    Respiratory:  Negative for cough and shortness of breath.    Cardiovascular:  Negative for chest pain.   Gastrointestinal:  Positive for abdominal pain and constipation. Negative for blood in stool, diarrhea, melena, nausea and vomiting.   Genitourinary:  Positive for dysuria. Negative for flank pain, frequency, hematuria and urgency.   Musculoskeletal:  Positive for myalgias.   Neurological:  Negative for dizziness and headaches.         PMH:  has a past medical history of Vitamin D deficiency.  MEDS:   Current Outpatient Medications:     IBUPROFEN PO, Take  by mouth., Disp: , Rfl:     NON SPECIFIED, Birth control pill, Disp: , Rfl:     amoxicillin-clavulanate (AUGMENTIN) 500-125 MG Tab, Take 1 Tablet by mouth every 8 hours. (Patient not taking: Reported on 8/21/2022), Disp: 30 Tablet, Rfl: 0    Magnesium 500 MG Cap, Take 2,000 mg by mouth every evening. 4 capsules = 2,000 mg. (Patient not taking: Reported on 8/21/2022), Disp: , Rfl:     progesterone (ENDOMETRIN) 100 MG vaginal insert, Insert 100 mg into the vagina 2 times a day., Disp: , Rfl:     Prenatal MV-Min-Fe Fum-FA-DHA (PRENATAL 1 PO), Take 1 Tablet by mouth every evening. (Patient not taking: Reported on 8/21/2022), Disp: , Rfl:   ALLERGIES:   Allergies   Allergen  "Reactions    Onion Hives and Swelling     Patient states \"throat porter closes\"     SURGHX:   Past Surgical History:   Procedure Laterality Date    RI LAP,RMV  ADNEXAL STRUCTURE Right 2022    Procedure: SALPINGECTOMY - LAPROSCOPIC - ECTOPIC;  Surgeon: Ismael Stevenson M.D.;  Location: SURGERY SAME DAY [facility];  Service: Gynecology    RI LAP,DIAGNOSTIC ABDOMEN N/A 2022    Procedure: PELVISCOPY;  Surgeon: Ismael Stevenson M.D.;  Location: SURGERY SAME DAY [facility];  Service: Gynecology    RI LAP,APPENDECTOMY N/A 2022    Procedure: APPENDECTOMY, LAPAROSCOPIC;  Surgeon: Ismael Stevenson M.D.;  Location: SURGERY SAME DAY [facility];  Service: Gynecology    PRIMARY C SECTION N/A 3/18/2021    Procedure:  SECTION, PRIMARY;  Surgeon: Esperanza Fuentes M.D.;  Location: SURGERY LABOR AND DELIVERY;  Service: Labor and Delivery     SOCHX:  reports that she has never smoked. She has never used smokeless tobacco. She reports that she does not currently use alcohol. She reports that she does not use drugs.  FH: Family history was reviewed, no pertinent findings to report      Objective     /60 (BP Location: Left arm, Patient Position: Sitting, BP Cuff Size: Adult)   Pulse (!) 123   Temp (!) 38.2 °C (100.8 °F) (Oral)   Resp 18   Ht 1.702 m (5' 7\")   Wt 83 kg (183 lb)   SpO2 98%   BMI 28.66 kg/m²      Physical Exam  Constitutional:       Appearance: She is well-developed.   HENT:      Head: Normocephalic and atraumatic.      Right Ear: External ear normal.      Left Ear: External ear normal.   Eyes:      Conjunctiva/sclera: Conjunctivae normal.      Pupils: Pupils are equal, round, and reactive to light.   Cardiovascular:      Rate and Rhythm: Regular rhythm. Tachycardia present.      Heart sounds: Normal heart sounds. No murmur heard.  Pulmonary:      Effort: Pulmonary effort is normal.      Breath sounds: Normal breath sounds. No wheezing.   Abdominal:      General: Abdomen is flat. Bowel sounds " are normal.      Palpations: Abdomen is soft.      Tenderness: There is abdominal tenderness in the right lower quadrant, suprapubic area and left lower quadrant. There is no right CVA tenderness or left CVA tenderness.      Comments: Diffuse lower abdominal tenderness worse in the suprapubic/left lower quadrant   Musculoskeletal:      Cervical back: Normal range of motion.   Lymphadenopathy:      Cervical: No cervical adenopathy.   Skin:     General: Skin is warm and dry.      Capillary Refill: Capillary refill takes less than 2 seconds.   Neurological:      Mental Status: She is alert and oriented to person, place, and time.   Psychiatric:         Behavior: Behavior normal.         Judgment: Judgment normal.        POCT Urinalysis  Lab Results   Component Value Date/Time    POCCOLOR [place] 08/21/2022 01:19 PM    POCAPPEAR Clear 08/21/2022 01:19 PM    POCLEUKEST Small 08/21/2022 01:19 PM    POCNITRITE Negative 08/21/2022 01:19 PM    POCUROBILIGE 0.2 08/21/2022 01:19 PM    POCPROTEIN 100 08/21/2022 01:19 PM    POCURPH 6.0 08/21/2022 01:19 PM    POCBLOOD Large 08/21/2022 01:19 PM    POCSPGRV 1.025 08/21/2022 01:19 PM    POCKETONES 160 08/21/2022 01:19 PM    POCBILIRUBIN Small 08/21/2022 01:19 PM    POCGLUCUA Negative 08/21/2022 01:19 PM          POCT Influenza A/B - Negative    POCT SARS-COV Antigen JOSE MIGUEL (Symptomatic only) - Negative      DX-CHEST-2 VIEWS  FINDINGS:     The heart is not enlarged. No focal consolidation, pleural effusion or pneumothorax is identified.  Costophrenic angles are clear. No free intraperitoneal air is identified.        IMPRESSION:     No acute cardiopulmonary process is identified.      *X-rays were reviewed and interpreted independently by me. I agree with the radiologist's findings     Assessment & Plan       1. Fever, unspecified fever cause  - DX-CHEST-2 VIEWS; Future  - POCT SARS-COV Antigen JOSE MIGUEL (Symptomatic only)  - POCT Urinalysis  - POCT Influenza A/B    2. Complicated UTI (urinary  tract infection)  - cefTRIAXone (Rocephin) 1 g, lidocaine (XYLOCAINE) 1 % 3.6 mL for IM use  - ciprofloxacin (CIPRO) 500 MG Tab; Take 1 Tablet by mouth 2 times a day for 7 days.  Dispense: 14 Tablet; Refill: 0  - URINE CULTURE(NEW); Future          Patient had appendectomy and salpingectomy on 7/27/2022 for right ectopic pregnancy.  States that she was feeling fine until about 5 days ago when she started to develop body aches, chills, and fatigue.  She states that the symptoms of gotten a little bit worse and then today she has developed fever, lower abdominal pain/pressure, and pain with urinating.  She denies any URI symptoms.  Flu, COVID, and chest x-ray all negative.  Urinalysis shows small leuks, protein, large RBCs.  Symptoms could be consistent with urinary tract infection with developing pyelonephritis given her systemic symptoms.  Cannot completely rule out possibility of intra-abdominal infection secondary to her surgery, however.  She was given 1 g Rocephin IM in the urgent care setting and started on oral antibiotics.  Discussed, however, that if she does not note significant improvement in her symptoms after 48 hours or if her symptoms worsen in the interim that she would need to report directly to the emergency department for higher level of care.

## 2022-08-22 ENCOUNTER — APPOINTMENT (OUTPATIENT)
Dept: RADIOLOGY | Facility: MEDICAL CENTER | Age: 32
DRG: 871 | End: 2022-08-22
Attending: EMERGENCY MEDICINE
Payer: COMMERCIAL

## 2022-08-22 ENCOUNTER — HOSPITAL ENCOUNTER (INPATIENT)
Facility: MEDICAL CENTER | Age: 32
LOS: 10 days | DRG: 871 | End: 2022-09-01
Attending: EMERGENCY MEDICINE | Admitting: INTERNAL MEDICINE
Payer: COMMERCIAL

## 2022-08-22 DIAGNOSIS — K65.1 INTRA-ABDOMINAL ABSCESS (HCC): ICD-10-CM

## 2022-08-22 DIAGNOSIS — R16.2 HEPATOSPLENOMEGALY: ICD-10-CM

## 2022-08-22 DIAGNOSIS — K65.0 PERITONITIS, ACUTE GENERALIZED (HCC): ICD-10-CM

## 2022-08-22 DIAGNOSIS — A41.9 SEPSIS WITHOUT ACUTE ORGAN DYSFUNCTION, DUE TO UNSPECIFIED ORGANISM (HCC): ICD-10-CM

## 2022-08-22 DIAGNOSIS — R18.8 OTHER ASCITES: ICD-10-CM

## 2022-08-22 PROBLEM — Z30.9 CONTRACEPTION MANAGEMENT: Status: ACTIVE | Noted: 2022-08-22

## 2022-08-22 PROBLEM — R65.21 SEPTIC SHOCK (HCC): Status: ACTIVE | Noted: 2022-08-22

## 2022-08-22 PROBLEM — E87.1 HYPONATREMIA: Status: ACTIVE | Noted: 2022-08-22

## 2022-08-22 PROBLEM — R10.84 GENERALIZED ABDOMINAL PAIN: Status: ACTIVE | Noted: 2022-08-22

## 2022-08-22 LAB
ALBUMIN SERPL BCP-MCNC: 3.6 G/DL (ref 3.2–4.9)
ALBUMIN/GLOB SERPL: 0.9 G/DL
ALP SERPL-CCNC: 74 U/L (ref 30–99)
ALT SERPL-CCNC: 8 U/L (ref 2–50)
ANION GAP SERPL CALC-SCNC: 15 MMOL/L (ref 7–16)
APPEARANCE UR: ABNORMAL
APPEARANCE UR: CLEAR
AST SERPL-CCNC: 9 U/L (ref 12–45)
BACTERIA #/AREA URNS HPF: NEGATIVE /HPF
BACTERIA #/AREA URNS HPF: NEGATIVE /HPF
BASOPHILS # BLD AUTO: 0.5 % (ref 0–1.8)
BASOPHILS # BLD: 0.03 K/UL (ref 0–0.12)
BILIRUB SERPL-MCNC: 0.5 MG/DL (ref 0.1–1.5)
BILIRUB UR QL STRIP.AUTO: ABNORMAL
BILIRUB UR QL STRIP.AUTO: NEGATIVE
BUN SERPL-MCNC: 9 MG/DL (ref 8–22)
C DIFF DNA SPEC QL NAA+PROBE: NEGATIVE
C DIFF TOX GENS STL QL NAA+PROBE: NEGATIVE
CALCIUM SERPL-MCNC: 9.1 MG/DL (ref 8.5–10.5)
CHLORIDE SERPL-SCNC: 97 MMOL/L (ref 96–112)
CO2 SERPL-SCNC: 21 MMOL/L (ref 20–33)
COLOR UR: ABNORMAL
COLOR UR: YELLOW
CORTIS SERPL-MCNC: 26.3 UG/DL (ref 0–23)
CREAT SERPL-MCNC: 0.73 MG/DL (ref 0.5–1.4)
EOSINOPHIL # BLD AUTO: 0.01 K/UL (ref 0–0.51)
EOSINOPHIL NFR BLD: 0.2 % (ref 0–6.9)
EPI CELLS #/AREA URNS HPF: ABNORMAL /HPF
EPI CELLS #/AREA URNS HPF: ABNORMAL /HPF
ERYTHROCYTE [DISTWIDTH] IN BLOOD BY AUTOMATED COUNT: 42.4 FL (ref 35.9–50)
ERYTHROCYTE [DISTWIDTH] IN BLOOD BY AUTOMATED COUNT: 42.9 FL (ref 35.9–50)
GFR SERPLBLD CREATININE-BSD FMLA CKD-EPI: 113 ML/MIN/1.73 M 2
GLOBULIN SER CALC-MCNC: 4 G/DL (ref 1.9–3.5)
GLUCOSE SERPL-MCNC: 129 MG/DL (ref 65–99)
GLUCOSE UR STRIP.AUTO-MCNC: NEGATIVE MG/DL
GLUCOSE UR STRIP.AUTO-MCNC: NEGATIVE MG/DL
GRAM STN SPEC: NORMAL
HCG SERPL QL: NEGATIVE
HCT VFR BLD AUTO: 32.4 % (ref 37–47)
HCT VFR BLD AUTO: 38 % (ref 37–47)
HGB BLD-MCNC: 11.1 G/DL (ref 12–16)
HGB BLD-MCNC: 12.9 G/DL (ref 12–16)
HYALINE CASTS #/AREA URNS LPF: ABNORMAL /LPF
IMM GRANULOCYTES # BLD AUTO: 0.02 K/UL (ref 0–0.11)
IMM GRANULOCYTES NFR BLD AUTO: 0.3 % (ref 0–0.9)
INR PPP: 1.4 (ref 0.87–1.13)
KETONES UR STRIP.AUTO-MCNC: 40 MG/DL
KETONES UR STRIP.AUTO-MCNC: >=160 MG/DL
LACTATE SERPL-SCNC: 1.4 MMOL/L (ref 0.5–2)
LACTATE SERPL-SCNC: 2.1 MMOL/L (ref 0.5–2)
LACTATE SERPL-SCNC: 3.3 MMOL/L (ref 0.5–2)
LEUKOCYTE ESTERASE UR QL STRIP.AUTO: ABNORMAL
LEUKOCYTE ESTERASE UR QL STRIP.AUTO: NEGATIVE
LIPASE SERPL-CCNC: 24 U/L (ref 11–82)
LYMPHOCYTES # BLD AUTO: 1.3 K/UL (ref 1–4.8)
LYMPHOCYTES NFR BLD: 20.3 % (ref 22–41)
MCH RBC QN AUTO: 27.6 PG (ref 27–33)
MCH RBC QN AUTO: 27.7 PG (ref 27–33)
MCHC RBC AUTO-ENTMCNC: 33.9 G/DL (ref 33.6–35)
MCHC RBC AUTO-ENTMCNC: 34.3 G/DL (ref 33.6–35)
MCV RBC AUTO: 80.8 FL (ref 81.4–97.8)
MCV RBC AUTO: 81.2 FL (ref 81.4–97.8)
MICRO URNS: ABNORMAL
MICRO URNS: ABNORMAL
MONOCYTES # BLD AUTO: 0.3 K/UL (ref 0–0.85)
MONOCYTES NFR BLD AUTO: 4.7 % (ref 0–13.4)
NEUTROPHILS # BLD AUTO: 4.74 K/UL (ref 2–7.15)
NEUTROPHILS NFR BLD: 74 % (ref 44–72)
NITRITE UR QL STRIP.AUTO: NEGATIVE
NITRITE UR QL STRIP.AUTO: NEGATIVE
NRBC # BLD AUTO: 0 K/UL
NRBC BLD-RTO: 0 /100 WBC
PH UR STRIP.AUTO: 5.5 [PH] (ref 5–8)
PH UR STRIP.AUTO: 6.5 [PH] (ref 5–8)
PLATELET # BLD AUTO: 253 K/UL (ref 164–446)
PLATELET # BLD AUTO: 327 K/UL (ref 164–446)
PMV BLD AUTO: 8.8 FL (ref 9–12.9)
PMV BLD AUTO: 8.8 FL (ref 9–12.9)
POTASSIUM SERPL-SCNC: 3.8 MMOL/L (ref 3.6–5.5)
PROT SERPL-MCNC: 7.6 G/DL (ref 6–8.2)
PROT UR QL STRIP: 100 MG/DL
PROT UR QL STRIP: 30 MG/DL
PROTHROMBIN TIME: 16.9 SEC (ref 12–14.6)
RBC # BLD AUTO: 4.01 M/UL (ref 4.2–5.4)
RBC # BLD AUTO: 4.68 M/UL (ref 4.2–5.4)
RBC # URNS HPF: ABNORMAL /HPF
RBC # URNS HPF: ABNORMAL /HPF
RBC UR QL AUTO: ABNORMAL
RBC UR QL AUTO: ABNORMAL
SIGNIFICANT IND 70042: NORMAL
SITE SITE: NORMAL
SODIUM SERPL-SCNC: 133 MMOL/L (ref 135–145)
SOURCE SOURCE: NORMAL
SP GR UR STRIP.AUTO: 1.03
SP GR UR STRIP.AUTO: >=1.045
UROBILINOGEN UR STRIP.AUTO-MCNC: 0.2 MG/DL
UROBILINOGEN UR STRIP.AUTO-MCNC: 0.2 MG/DL
WBC # BLD AUTO: 5.7 K/UL (ref 4.8–10.8)
WBC # BLD AUTO: 6.4 K/UL (ref 4.8–10.8)
WBC #/AREA URNS HPF: ABNORMAL /HPF
WBC #/AREA URNS HPF: ABNORMAL /HPF

## 2022-08-22 PROCEDURE — 96375 TX/PRO/DX INJ NEW DRUG ADDON: CPT

## 2022-08-22 PROCEDURE — 700102 HCHG RX REV CODE 250 W/ 637 OVERRIDE(OP): Performed by: EMERGENCY MEDICINE

## 2022-08-22 PROCEDURE — 83690 ASSAY OF LIPASE: CPT

## 2022-08-22 PROCEDURE — 85610 PROTHROMBIN TIME: CPT

## 2022-08-22 PROCEDURE — 84703 CHORIONIC GONADOTROPIN ASSAY: CPT

## 2022-08-22 PROCEDURE — 74177 CT ABD & PELVIS W/CONTRAST: CPT

## 2022-08-22 PROCEDURE — 87086 URINE CULTURE/COLONY COUNT: CPT

## 2022-08-22 PROCEDURE — 85027 COMPLETE CBC AUTOMATED: CPT

## 2022-08-22 PROCEDURE — 96365 THER/PROPH/DIAG IV INF INIT: CPT

## 2022-08-22 PROCEDURE — 96366 THER/PROPH/DIAG IV INF ADDON: CPT

## 2022-08-22 PROCEDURE — 87205 SMEAR GRAM STAIN: CPT

## 2022-08-22 PROCEDURE — 87040 BLOOD CULTURE FOR BACTERIA: CPT | Mod: 91

## 2022-08-22 PROCEDURE — 80053 COMPREHEN METABOLIC PANEL: CPT

## 2022-08-22 PROCEDURE — 87186 SC STD MICRODIL/AGAR DIL: CPT

## 2022-08-22 PROCEDURE — 700105 HCHG RX REV CODE 258: Performed by: INTERNAL MEDICINE

## 2022-08-22 PROCEDURE — 99292 CRITICAL CARE ADDL 30 MIN: CPT | Performed by: INTERNAL MEDICINE

## 2022-08-22 PROCEDURE — 83605 ASSAY OF LACTIC ACID: CPT | Mod: 91

## 2022-08-22 PROCEDURE — 87899 AGENT NOS ASSAY W/OPTIC: CPT

## 2022-08-22 PROCEDURE — 87491 CHLMYD TRACH DNA AMP PROBE: CPT

## 2022-08-22 PROCEDURE — 82533 TOTAL CORTISOL: CPT

## 2022-08-22 PROCEDURE — 71045 X-RAY EXAM CHEST 1 VIEW: CPT

## 2022-08-22 PROCEDURE — 99222 1ST HOSP IP/OBS MODERATE 55: CPT | Mod: 24 | Performed by: SURGERY

## 2022-08-22 PROCEDURE — 87077 CULTURE AEROBIC IDENTIFY: CPT

## 2022-08-22 PROCEDURE — 99291 CRITICAL CARE FIRST HOUR: CPT

## 2022-08-22 PROCEDURE — 87493 C DIFF AMPLIFIED PROBE: CPT

## 2022-08-22 PROCEDURE — 700111 HCHG RX REV CODE 636 W/ 250 OVERRIDE (IP): Performed by: EMERGENCY MEDICINE

## 2022-08-22 PROCEDURE — 700111 HCHG RX REV CODE 636 W/ 250 OVERRIDE (IP): Performed by: INTERNAL MEDICINE

## 2022-08-22 PROCEDURE — 770022 HCHG ROOM/CARE - ICU (200)

## 2022-08-22 PROCEDURE — A9270 NON-COVERED ITEM OR SERVICE: HCPCS | Performed by: EMERGENCY MEDICINE

## 2022-08-22 PROCEDURE — 87070 CULTURE OTHR SPECIMN AEROBIC: CPT

## 2022-08-22 PROCEDURE — 700101 HCHG RX REV CODE 250: Performed by: EMERGENCY MEDICINE

## 2022-08-22 PROCEDURE — 700117 HCHG RX CONTRAST REV CODE 255: Performed by: EMERGENCY MEDICINE

## 2022-08-22 PROCEDURE — 81001 URINALYSIS AUTO W/SCOPE: CPT

## 2022-08-22 PROCEDURE — 87591 N.GONORRHOEAE DNA AMP PROB: CPT

## 2022-08-22 PROCEDURE — 85025 COMPLETE CBC W/AUTO DIFF WBC: CPT

## 2022-08-22 PROCEDURE — 87045 FECES CULTURE AEROBIC BACT: CPT

## 2022-08-22 PROCEDURE — 36415 COLL VENOUS BLD VENIPUNCTURE: CPT

## 2022-08-22 PROCEDURE — 99291 CRITICAL CARE FIRST HOUR: CPT | Performed by: INTERNAL MEDICINE

## 2022-08-22 PROCEDURE — 700105 HCHG RX REV CODE 258: Performed by: EMERGENCY MEDICINE

## 2022-08-22 RX ORDER — CEFTRIAXONE 2 G/1
2 INJECTION, POWDER, FOR SOLUTION INTRAMUSCULAR; INTRAVENOUS ONCE
Status: COMPLETED | OUTPATIENT
Start: 2022-08-22 | End: 2022-08-22

## 2022-08-22 RX ORDER — ENOXAPARIN SODIUM 100 MG/ML
40 INJECTION SUBCUTANEOUS DAILY
Status: DISCONTINUED | OUTPATIENT
Start: 2022-08-23 | End: 2022-08-23

## 2022-08-22 RX ORDER — SODIUM CHLORIDE, SODIUM LACTATE, POTASSIUM CHLORIDE, CALCIUM CHLORIDE 600; 310; 30; 20 MG/100ML; MG/100ML; MG/100ML; MG/100ML
INJECTION, SOLUTION INTRAVENOUS CONTINUOUS
Status: ACTIVE | OUTPATIENT
Start: 2022-08-22 | End: 2022-08-23

## 2022-08-22 RX ORDER — HYDROMORPHONE HYDROCHLORIDE 1 MG/ML
0.5 INJECTION, SOLUTION INTRAMUSCULAR; INTRAVENOUS; SUBCUTANEOUS
Status: DISCONTINUED | OUTPATIENT
Start: 2022-08-22 | End: 2022-08-23

## 2022-08-22 RX ORDER — METRONIDAZOLE 500 MG/100ML
500 INJECTION, SOLUTION INTRAVENOUS ONCE
Status: COMPLETED | OUTPATIENT
Start: 2022-08-22 | End: 2022-08-22

## 2022-08-22 RX ORDER — ACETAMINOPHEN 650 MG/1
650 SUPPOSITORY RECTAL ONCE
Status: COMPLETED | OUTPATIENT
Start: 2022-08-22 | End: 2022-08-22

## 2022-08-22 RX ORDER — SODIUM CHLORIDE, SODIUM LACTATE, POTASSIUM CHLORIDE, AND CALCIUM CHLORIDE .6; .31; .03; .02 G/100ML; G/100ML; G/100ML; G/100ML
30 INJECTION, SOLUTION INTRAVENOUS ONCE
Status: COMPLETED | OUTPATIENT
Start: 2022-08-22 | End: 2022-08-22

## 2022-08-22 RX ORDER — ONDANSETRON 2 MG/ML
4 INJECTION INTRAMUSCULAR; INTRAVENOUS ONCE
Status: COMPLETED | OUTPATIENT
Start: 2022-08-22 | End: 2022-08-22

## 2022-08-22 RX ORDER — AMOXICILLIN 250 MG
2 CAPSULE ORAL 2 TIMES DAILY
Status: DISCONTINUED | OUTPATIENT
Start: 2022-08-22 | End: 2022-09-01

## 2022-08-22 RX ORDER — OXYCODONE HYDROCHLORIDE 5 MG/1
5 TABLET ORAL
Status: DISCONTINUED | OUTPATIENT
Start: 2022-08-22 | End: 2022-08-23

## 2022-08-22 RX ORDER — METRONIDAZOLE 500 MG/100ML
500 INJECTION, SOLUTION INTRAVENOUS EVERY 6 HOURS
Status: COMPLETED | OUTPATIENT
Start: 2022-08-22 | End: 2022-08-22

## 2022-08-22 RX ORDER — ONDANSETRON 2 MG/ML
4 INJECTION INTRAMUSCULAR; INTRAVENOUS EVERY 4 HOURS PRN
Status: DISCONTINUED | OUTPATIENT
Start: 2022-08-22 | End: 2022-09-01

## 2022-08-22 RX ORDER — ACETAMINOPHEN 325 MG/1
650 TABLET ORAL EVERY 6 HOURS PRN
Status: DISCONTINUED | OUTPATIENT
Start: 2022-08-22 | End: 2022-08-30

## 2022-08-22 RX ORDER — MORPHINE SULFATE 4 MG/ML
4 INJECTION INTRAVENOUS ONCE
Status: COMPLETED | OUTPATIENT
Start: 2022-08-22 | End: 2022-08-22

## 2022-08-22 RX ORDER — PROMETHAZINE HYDROCHLORIDE 25 MG/1
12.5-25 TABLET ORAL EVERY 4 HOURS PRN
Status: DISCONTINUED | OUTPATIENT
Start: 2022-08-22 | End: 2022-08-23

## 2022-08-22 RX ORDER — ONDANSETRON 4 MG/1
4 TABLET, ORALLY DISINTEGRATING ORAL EVERY 4 HOURS PRN
Status: DISCONTINUED | OUTPATIENT
Start: 2022-08-22 | End: 2022-09-01

## 2022-08-22 RX ORDER — OXYCODONE HYDROCHLORIDE 5 MG/1
2.5 TABLET ORAL
Status: DISCONTINUED | OUTPATIENT
Start: 2022-08-22 | End: 2022-08-23

## 2022-08-22 RX ORDER — BISACODYL 10 MG
10 SUPPOSITORY, RECTAL RECTAL
Status: DISCONTINUED | OUTPATIENT
Start: 2022-08-22 | End: 2022-09-01

## 2022-08-22 RX ORDER — NOREPINEPHRINE BITARTRATE 0.03 MG/ML
0-30 INJECTION, SOLUTION INTRAVENOUS CONTINUOUS
Status: DISCONTINUED | OUTPATIENT
Start: 2022-08-22 | End: 2022-08-23

## 2022-08-22 RX ORDER — PROMETHAZINE HYDROCHLORIDE 25 MG/1
12.5-25 SUPPOSITORY RECTAL EVERY 4 HOURS PRN
Status: DISCONTINUED | OUTPATIENT
Start: 2022-08-22 | End: 2022-08-23

## 2022-08-22 RX ORDER — HYDROMORPHONE HYDROCHLORIDE 1 MG/ML
0.5 INJECTION, SOLUTION INTRAMUSCULAR; INTRAVENOUS; SUBCUTANEOUS
Status: DISCONTINUED | OUTPATIENT
Start: 2022-08-22 | End: 2022-08-22

## 2022-08-22 RX ORDER — POLYETHYLENE GLYCOL 3350 17 G/17G
1 POWDER, FOR SOLUTION ORAL
Status: DISCONTINUED | OUTPATIENT
Start: 2022-08-22 | End: 2022-09-01

## 2022-08-22 RX ORDER — HYDROMORPHONE HYDROCHLORIDE 1 MG/ML
0.5 INJECTION, SOLUTION INTRAMUSCULAR; INTRAVENOUS; SUBCUTANEOUS ONCE
Status: COMPLETED | OUTPATIENT
Start: 2022-08-22 | End: 2022-08-22

## 2022-08-22 RX ADMIN — SODIUM CHLORIDE, POTASSIUM CHLORIDE, SODIUM LACTATE AND CALCIUM CHLORIDE 2490 ML: 600; 310; 30; 20 INJECTION, SOLUTION INTRAVENOUS at 12:41

## 2022-08-22 RX ADMIN — ACETAMINOPHEN 650 MG: 650 SUPPOSITORY RECTAL at 16:00

## 2022-08-22 RX ADMIN — SODIUM CHLORIDE, POTASSIUM CHLORIDE, SODIUM LACTATE AND CALCIUM CHLORIDE: 600; 310; 30; 20 INJECTION, SOLUTION INTRAVENOUS at 16:00

## 2022-08-22 RX ADMIN — HYDROMORPHONE HYDROCHLORIDE 0.5 MG: 1 INJECTION, SOLUTION INTRAMUSCULAR; INTRAVENOUS; SUBCUTANEOUS at 22:11

## 2022-08-22 RX ADMIN — MORPHINE SULFATE 4 MG: 4 INJECTION INTRAVENOUS at 12:41

## 2022-08-22 RX ADMIN — METRONIDAZOLE 500 MG: 5 INJECTION, SOLUTION INTRAVENOUS at 13:16

## 2022-08-22 RX ADMIN — ONDANSETRON 4 MG: 2 INJECTION INTRAMUSCULAR; INTRAVENOUS at 12:41

## 2022-08-22 RX ADMIN — METRONIDAZOLE 500 MG: 5 INJECTION, SOLUTION INTRAVENOUS at 17:13

## 2022-08-22 RX ADMIN — CEFTRIAXONE SODIUM 2 G: 2 INJECTION, POWDER, FOR SOLUTION INTRAMUSCULAR; INTRAVENOUS at 12:40

## 2022-08-22 RX ADMIN — PIPERACILLIN AND TAZOBACTAM 3.38 G: 3; .375 INJECTION, POWDER, LYOPHILIZED, FOR SOLUTION INTRAVENOUS; PARENTERAL at 18:34

## 2022-08-22 RX ADMIN — HYDROMORPHONE HYDROCHLORIDE 0.5 MG: 1 INJECTION, SOLUTION INTRAMUSCULAR; INTRAVENOUS; SUBCUTANEOUS at 19:26

## 2022-08-22 RX ADMIN — ONDANSETRON 4 MG: 2 INJECTION INTRAMUSCULAR; INTRAVENOUS at 22:14

## 2022-08-22 RX ADMIN — PIPERACILLIN SODIUM AND TAZOBACTAM SODIUM 3.38 G: 3; .375 INJECTION, POWDER, FOR SOLUTION INTRAVENOUS at 23:05

## 2022-08-22 RX ADMIN — SODIUM CHLORIDE, POTASSIUM CHLORIDE, SODIUM LACTATE AND CALCIUM CHLORIDE: 600; 310; 30; 20 INJECTION, SOLUTION INTRAVENOUS at 19:26

## 2022-08-22 RX ADMIN — SODIUM CHLORIDE, POTASSIUM CHLORIDE, SODIUM LACTATE AND CALCIUM CHLORIDE: 600; 310; 30; 20 INJECTION, SOLUTION INTRAVENOUS at 23:07

## 2022-08-22 RX ADMIN — HYDROMORPHONE HYDROCHLORIDE 0.5 MG: 1 INJECTION, SOLUTION INTRAMUSCULAR; INTRAVENOUS; SUBCUTANEOUS at 14:55

## 2022-08-22 RX ADMIN — IOHEXOL 100 ML: 350 INJECTION, SOLUTION INTRAVENOUS at 15:19

## 2022-08-22 ASSESSMENT — PAIN DESCRIPTION - PAIN TYPE
TYPE: ACUTE PAIN

## 2022-08-22 ASSESSMENT — LIFESTYLE VARIABLES
DO YOU DRINK ALCOHOL: NO
EVER FELT BAD OR GUILTY ABOUT YOUR DRINKING: NO
HAVE PEOPLE ANNOYED YOU BY CRITICIZING YOUR DRINKING: NO
TOTAL SCORE: 0
HAVE YOU EVER FELT YOU SHOULD CUT DOWN ON YOUR DRINKING: NO
EVER HAD A DRINK FIRST THING IN THE MORNING TO STEADY YOUR NERVES TO GET RID OF A HANGOVER: NO
CONSUMPTION TOTAL: INCOMPLETE

## 2022-08-22 ASSESSMENT — FIBROSIS 4 INDEX: FIB4 SCORE: 0.99

## 2022-08-22 NOTE — ED NOTES
Pt wc from lobby to room with family, pt c/o abd pain with recent surgery. Pt stated lbm yesterday, abd pain worsened since yesterday. Pt applied gown and in room for md to see. Blankets provided.

## 2022-08-22 NOTE — Clinical Note
Pt transferred to CT table in prone position at 0915. Patient unable to tolerate lying flat on CT table, Dr. Allen at bedside. Case cancelled and will reschedule with Anesthesia. Report called to Tito CHOW. Pt transported by marci with RN to [id number].

## 2022-08-22 NOTE — ED PROVIDER NOTES
ED Provider Note    CHIEF COMPLAINT  Chief Complaint   Patient presents with    Abdominal Pain     Pt states she's been having abdominal pain since 8/17/2022. Pain has been progressively getting worse since then.    Vaginal Bleeding     Bleeding since Saturday, unsure if this is her menstrual cycle or not.      Fever     Pt states she had a fever of 100.8 at home for which she took ibuprofen.     N/V       HPI  Brunilda Webber is a 32 y.o. female who presents complaining of abdominal pain that has been getting progressively worse since 8/17/2022.  The patient has some vomiting and diarrhea associated with it.  She is having some vaginal bleeding and unsure if this is her menstrual cycle or not.  She reports a fever of 100.8 at home.  Nothing makes the pain better or worse and she describes it as diffuse in nature.  She denies dysuria frequency or urgency.  She states that this morning when she woke up her abdominal pain was unbearable and diffuse.  In July 27th of this year the patient had appendicitis and an ectopic as well as intrauterine pregnancy and underwent appendectomy and salpingectomy on 7/27/22.  She then had a D&C a week later with Dr. Wallis , her IVF physician.  Patient denies any vaginal discharge.  She is having trouble keeping any food or fluid down.  Nothing seems to make the pain better or worse.  She denies any dysuria frequency or urgency.  She denies any smoking drinking or drug abuse.      REVIEW OF SYSTEMS    HEENT:  No ear pain, congestion or sore throat   EYES: no discharge redness or vision changes  CARDIAC: no chest pain, palpitations    PULMONARY: no dyspnea, cough or congestion   GI: See history of present illness  : no dysuria, back pain or hematuria   Neuro: no weakness, numbness aphasia or headache  Musculoskeletal: no swelling deformity or pain no joint swelling  Endocrine: Positive fevers, no sweating, weight loss   SKIN: no rash, erythema or contusions     See history of present  "illness all other systems are negative    PAST MEDICAL HISTORY  Past Medical History:   Diagnosis Date    Vitamin D deficiency        FAMILY HISTORY  Family History   Problem Relation Age of Onset    Thyroid Mother     Cancer Father         skin    Cancer Maternal Grandmother         breast    Heart Disease Maternal Grandfather 70        mi    Cancer Paternal Grandmother         lung in smoker       SOCIAL HISTORY  Social History     Socioeconomic History    Marital status:    Tobacco Use    Smoking status: Never    Smokeless tobacco: Never   Vaping Use    Vaping Use: Never used   Substance and Sexual Activity    Alcohol use: Not Currently     Alcohol/week: 0.0 oz    Drug use: No       SURGICAL HISTORY  Past Surgical History:   Procedure Laterality Date    AL LAP,RMV  ADNEXAL STRUCTURE Right 2022    Procedure: SALPINGECTOMY - LAPROSCOPIC - ECTOPIC;  Surgeon: Ismael Stevenson M.D.;  Location: SURGERY SAME DAY [facility];  Service: Gynecology    AL LAP,DIAGNOSTIC ABDOMEN N/A 2022    Procedure: PELVISCOPY;  Surgeon: Ismael Stevenson M.D.;  Location: SURGERY SAME DAY [facility];  Service: Gynecology    AL LAP,APPENDECTOMY N/A 2022    Procedure: APPENDECTOMY, LAPAROSCOPIC;  Surgeon: Ismael Stevenson M.D.;  Location: SURGERY SAME DAY [facility];  Service: Gynecology    PRIMARY C SECTION N/A 3/18/2021    Procedure:  SECTION, PRIMARY;  Surgeon: Esperanza Fuentes M.D.;  Location: SURGERY LABOR AND DELIVERY;  Service: Labor and Delivery       CURRENT MEDICATIONS  Home Medications    **Home medications have not yet been reviewed for this encounter**         ALLERGIES  Allergies   Allergen Reactions    Onion Hives and Swelling     Patient states \"throat porter closes\"       PHYSICAL EXAM  VITAL SIGNS: BP (!) 94/53   Pulse (!) 122   Temp (!) 39.6 °C (103.3 °F) (Oral)   Resp (!) 26   Ht 1.702 m (5' 7\")   Wt 83 kg (183 lb)   SpO2 94%   BMI 28.66 kg/m²   Constitutional: Well developed, Well nourished, " ill and dehydrated appearing and comfortable  HEENT: Normocephalic, Atraumatic,  external ears normal, pharynx pink,  Mucous  Membranes dry, No rhinorrhea or mucosal edema  Eyes: PERRL, EOMI, Conjunctiva normal, No discharge.   Neck: Normal range of motion, No tenderness, Supple, No stridor.   Lymphatic: No lymphadenopathy    Cardiovascular: Tachycardic regular Rate and Rhythm, No murmurs,  rubs, or gallops.   Thorax & Lungs: Lungs clear to auscultation bilaterally, No respiratory distress, No wheezes, rhales or rhonchi, No chest wall tenderness.   Abdomen: Diffuse tenderness in all quadrants to palpation with decreased bowel sounds.  No distention bowel sounds are decreased  Skin: Warm, Dry, No erythema, No rash,   Back:  No CVA tenderness,  No spinal tenderness, bony crepitance step offs or instability.   Extremities: Equal, intact distal pulses, No cyanosis, clubbing or edema,  No tenderness.   Musculoskeletal: Good range of motion in all major joints. No tenderness to palpation or major deformities noted.   Neurologic: Alert & oriented x 3,  No focal deficits noted.   Psychiatric: Affect normal, Judgment normal, Mood normal.       RADIOLOGY/PROCEDURES  CT-ABDOMEN-PELVIS WITH   Final Result      1.  Hepatosplenomegaly.      2.  Small to moderate ascites throughout the abdomen and pelvis.      3.  Bilateral ovarian cysts, right greater than left.      DX-CHEST-PORTABLE (1 VIEW)   Final Result      No acute cardiac or pulmonary abnormalities are identified.            COURSE & MEDICAL DECISION MAKING  Pertinent Labs & Imaging studies reviewed. (See chart for details)  Differential diagnosis: Sepsis, pyelonephritis, ileus, SBO, colitis, dehydration, electrolyte disturbance    Chart review the  a laparoscopic salpingectomy on 7/27/2022 by Dr. Stevenson.  Intraoperatively the patient was noticed to have an inflamed right appendix and Dr. Lux was consulted intraoperatively and removed the appendix.    HYDRATION: Based  on the patient's presentation of Abnormal Fluid Loss the patient was given IV fluids. IV Hydration was used because oral hydration was not adequate alone. Upon recheck following hydration, the patient was improved.    Results for orders placed or performed during the hospital encounter of 08/22/22   CBC WITH DIFFERENTIAL   Result Value Ref Range    WBC 6.4 4.8 - 10.8 K/uL    RBC 4.68 4.20 - 5.40 M/uL    Hemoglobin 12.9 12.0 - 16.0 g/dL    Hematocrit 38.0 37.0 - 47.0 %    MCV 81.2 (L) 81.4 - 97.8 fL    MCH 27.6 27.0 - 33.0 pg    MCHC 33.9 33.6 - 35.0 g/dL    RDW 42.9 35.9 - 50.0 fL    Platelet Count 327 164 - 446 K/uL    MPV 8.8 (L) 9.0 - 12.9 fL    Neutrophils-Polys 74.00 (H) 44.00 - 72.00 %    Lymphocytes 20.30 (L) 22.00 - 41.00 %    Monocytes 4.70 0.00 - 13.40 %    Eosinophils 0.20 0.00 - 6.90 %    Basophils 0.50 0.00 - 1.80 %    Immature Granulocytes 0.30 0.00 - 0.90 %    Nucleated RBC 0.00 /100 WBC    Neutrophils (Absolute) 4.74 2.00 - 7.15 K/uL    Lymphs (Absolute) 1.30 1.00 - 4.80 K/uL    Monos (Absolute) 0.30 0.00 - 0.85 K/uL    Eos (Absolute) 0.01 0.00 - 0.51 K/uL    Baso (Absolute) 0.03 0.00 - 0.12 K/uL    Immature Granulocytes (abs) 0.02 0.00 - 0.11 K/uL    NRBC (Absolute) 0.00 K/uL   COMP METABOLIC PANEL   Result Value Ref Range    Sodium 133 (L) 135 - 145 mmol/L    Potassium 3.8 3.6 - 5.5 mmol/L    Chloride 97 96 - 112 mmol/L    Co2 21 20 - 33 mmol/L    Anion Gap 15.0 7.0 - 16.0    Glucose 129 (H) 65 - 99 mg/dL    Bun 9 8 - 22 mg/dL    Creatinine 0.73 0.50 - 1.40 mg/dL    Calcium 9.1 8.5 - 10.5 mg/dL    AST(SGOT) 9 (L) 12 - 45 U/L    ALT(SGPT) 8 2 - 50 U/L    Alkaline Phosphatase 74 30 - 99 U/L    Total Bilirubin 0.5 0.1 - 1.5 mg/dL    Albumin 3.6 3.2 - 4.9 g/dL    Total Protein 7.6 6.0 - 8.2 g/dL    Globulin 4.0 (H) 1.9 - 3.5 g/dL    A-G Ratio 0.9 g/dL   LIPASE   Result Value Ref Range    Lipase 24 11 - 82 U/L   HCG QUAL SERUM   Result Value Ref Range    Beta-Hcg Qualitative Serum Negative Negative    URINALYSIS,CULTURE IF INDICATED    Specimen: Urine   Result Value Ref Range    Color DK Yellow     Character Cloudy (A)     Specific Gravity 1.030 <1.035    Ph 5.5 5.0 - 8.0    Glucose Negative Negative mg/dL    Ketones >=160 Negative mg/dL    Protein 100 (A) Negative mg/dL    Bilirubin Small (A) Negative    Urobilinogen, Urine 0.2 Negative    Nitrite Negative Negative    Leukocyte Esterase Moderate (A) Negative    Occult Blood Moderate (A) Negative    Micro Urine Req Microscopic    ESTIMATED GFR   Result Value Ref Range    GFR (CKD-EPI) 113 >60 mL/min/1.73 m 2   Lactic Acid   Result Value Ref Range    Lactic Acid 2.1 (H) 0.5 - 2.0 mmol/L   Lactic Acid   Result Value Ref Range    Lactic Acid 3.3 (H) 0.5 - 2.0 mmol/L   Lactic Acid   Result Value Ref Range    Lactic Acid 1.4 0.5 - 2.0 mmol/L   URINE MICROSCOPIC (W/UA)   Result Value Ref Range    WBC 20-50 (A) /hpf    RBC 10-20 (A) /hpf    Bacteria Negative None /hpf    Epithelial Cells Moderate (A) /hpf    Hyaline Cast 0-2 /lpf   Chlamydia/GC, PCR (Genital/Anal swab)   Result Value Ref Range    Source Endo/Cervical    CBC WITHOUT DIFFERENTIAL   Result Value Ref Range    WBC 5.7 4.8 - 10.8 K/uL    RBC 4.01 (L) 4.20 - 5.40 M/uL    Hemoglobin 11.1 (L) 12.0 - 16.0 g/dL    Hematocrit 32.4 (L) 37.0 - 47.0 %    MCV 80.8 (L) 81.4 - 97.8 fL    MCH 27.7 27.0 - 33.0 pg    MCHC 34.3 33.6 - 35.0 g/dL    RDW 42.4 35.9 - 50.0 fL    Platelet Count 253 164 - 446 K/uL    MPV 8.8 (L) 9.0 - 12.9 fL        1:08 PM I gave the patient morphine and Zofran for pain and nausea as well as IV fluids and antibiotics per the sepsis protocol.    Patient has had several episodes of hypotension that are responsive to IV fluids.  I have controlled her pain with Dilaudid.    3:31 PM patient's repeat temperature is 103.  Rectal Tylenol was ordered.  The patient is still NPO.  She is still very tender and peritoneal.  CT results are pending.  I performed a pelvic exam on the patient when she got back  from CT scan.  She has some bloody vaginal fluid with some serous fluid as well.  The cervix is boggy but she does not have cervical motion tenderness.  Patient received a total of 3 L of lactated Ringer's and I have started LR at 250 cc an hour.  The patient remains NPO.    4:07 PM I spoke with general surgery Dr. Maldonado who will evaluate the patient at bedside for possible surgery.  I also spoke with Dr. Stevenson GYN who will also see the patient to make sure there is nothing else to add from a gynecological standpoint.    Patient CT scan shows moderate ascites.  I am concerned about intraperitoneal infection.      4:45 PM patient's white count is 5.7 and her lactate is down to 1.4.  Dr. Stevenson saw the patient and will follow along and consult but does not think this is gynecologic in origin.  Dr. Maldonado is at the bedside evaluating the patient.  They are planning to get interventional radiology to sample some of the peritoneal fluid for infection.  She will be admitted to the intensive care unit and I have paged the intensivist for further evaluation.  Dr. Maldonado also recommended stool cultures and C. difficile which I have ordered.    5:06 PM I spoke with the intensivist Dr. Terry who will admit the patient to the ICU.    Critical Care  Due to the real possibility of a deterioration of this patient's condition required the highest level of my preparedness for sudden emergent intervention. I provided critical care services which included medication orders, frequent reevaluations of the patient's condition and response to treatment, ordering and reviewing test results and discussing the case with various consultants. The critical care time associated with the care of the patient was 50 minutes. Review chart for interventions. This time is exclusive of any other billable procedures.     FINAL IMPRESSION  1. Sepsis without acute organ dysfunction, due to unspecified organism (HCC)    2. Peritonitis, acute  generalized (HCC)    3. Other ascites           PLAN/DISPOSITION  Admitted in serious condition          Electronically signed by: Viridiana Olivares M.D., 8/22/2022 12:14 PM

## 2022-08-22 NOTE — ED TRIAGE NOTES
Brunilda Diamondd  32 y.o. female  Chief Complaint   Patient presents with    Abdominal Pain     Pt states she's been having abdominal pain since 8/17/2022. Pain has been progressively getting worse since then.    Vaginal Bleeding     Bleeding since Saturday, unsure if this is her menstrual cycle or not.      Fever     Pt states she had a fever of 100.8 at home for which she took ibuprofen.     N/V       Vitals:    08/22/22 1040   BP: 125/70   Pulse: (!) 132   Resp: 19   Temp: 36.7 °C (98 °F)   SpO2: 98%       Patient educated on triage process and encouraged to alert staff of any changes in condition.    Pt to triage via wheelchair. Pt was seen at a [facility] yesterday and was told they suspect an intra abdominal infection after she recently had her appendix removed 7/27/2022.

## 2022-08-23 ENCOUNTER — APPOINTMENT (OUTPATIENT)
Dept: RADIOLOGY | Facility: MEDICAL CENTER | Age: 32
DRG: 871 | End: 2022-08-23
Attending: SURGERY
Payer: COMMERCIAL

## 2022-08-23 ENCOUNTER — APPOINTMENT (OUTPATIENT)
Dept: RADIOLOGY | Facility: MEDICAL CENTER | Age: 32
DRG: 871 | End: 2022-08-23
Attending: OBSTETRICS & GYNECOLOGY
Payer: COMMERCIAL

## 2022-08-23 LAB
ALBUMIN SERPL BCP-MCNC: 2.9 G/DL (ref 3.2–4.9)
ALBUMIN/GLOB SERPL: 0.9 G/DL
ALP SERPL-CCNC: 49 U/L (ref 30–99)
ALT SERPL-CCNC: 6 U/L (ref 2–50)
ANION GAP SERPL CALC-SCNC: 12 MMOL/L (ref 7–16)
AST SERPL-CCNC: 11 U/L (ref 12–45)
BILIRUB SERPL-MCNC: 0.6 MG/DL (ref 0.1–1.5)
BUN SERPL-MCNC: 6 MG/DL (ref 8–22)
C TRACH DNA GENITAL QL NAA+PROBE: NEGATIVE
CALCIUM SERPL-MCNC: 8.2 MG/DL (ref 8.5–10.5)
CHLORIDE SERPL-SCNC: 96 MMOL/L (ref 96–112)
CO2 SERPL-SCNC: 24 MMOL/L (ref 20–33)
CREAT SERPL-MCNC: 0.73 MG/DL (ref 0.5–1.4)
ERYTHROCYTE [DISTWIDTH] IN BLOOD BY AUTOMATED COUNT: 43.7 FL (ref 35.9–50)
GFR SERPLBLD CREATININE-BSD FMLA CKD-EPI: 113 ML/MIN/1.73 M 2
GLOBULIN SER CALC-MCNC: 3.2 G/DL (ref 1.9–3.5)
GLUCOSE SERPL-MCNC: 137 MG/DL (ref 65–99)
HCT VFR BLD AUTO: 33.1 % (ref 37–47)
HGB BLD-MCNC: 11.3 G/DL (ref 12–16)
MCH RBC QN AUTO: 27.8 PG (ref 27–33)
MCHC RBC AUTO-ENTMCNC: 34.1 G/DL (ref 33.6–35)
MCV RBC AUTO: 81.3 FL (ref 81.4–97.8)
N GONORRHOEA DNA GENITAL QL NAA+PROBE: NEGATIVE
PLATELET # BLD AUTO: 270 K/UL (ref 164–446)
PMV BLD AUTO: 8.5 FL (ref 9–12.9)
POTASSIUM SERPL-SCNC: 4.2 MMOL/L (ref 3.6–5.5)
PROT SERPL-MCNC: 6.1 G/DL (ref 6–8.2)
RBC # BLD AUTO: 4.07 M/UL (ref 4.2–5.4)
SARS-COV+SARS-COV-2 AG RESP QL IA.RAPID: NOTDETECTED
SODIUM SERPL-SCNC: 132 MMOL/L (ref 135–145)
SPECIMEN SOURCE: NORMAL
SPECIMEN SOURCE: NORMAL
WBC # BLD AUTO: 14.3 K/UL (ref 4.8–10.8)

## 2022-08-23 PROCEDURE — 85027 COMPLETE CBC AUTOMATED: CPT

## 2022-08-23 PROCEDURE — A9270 NON-COVERED ITEM OR SERVICE: HCPCS | Performed by: INTERNAL MEDICINE

## 2022-08-23 PROCEDURE — 99231 SBSQ HOSP IP/OBS SF/LOW 25: CPT | Mod: 24 | Performed by: SURGERY

## 2022-08-23 PROCEDURE — 700102 HCHG RX REV CODE 250 W/ 637 OVERRIDE(OP): Performed by: INTERNAL MEDICINE

## 2022-08-23 PROCEDURE — 700105 HCHG RX REV CODE 258: Performed by: HOSPITALIST

## 2022-08-23 PROCEDURE — C9803 HOPD COVID-19 SPEC COLLECT: HCPCS | Performed by: HOSPITALIST

## 2022-08-23 PROCEDURE — 76856 US EXAM PELVIC COMPLETE: CPT

## 2022-08-23 PROCEDURE — 87426 SARSCOV CORONAVIRUS AG IA: CPT

## 2022-08-23 PROCEDURE — 700111 HCHG RX REV CODE 636 W/ 250 OVERRIDE (IP): Performed by: INTERNAL MEDICINE

## 2022-08-23 PROCEDURE — 700105 HCHG RX REV CODE 258: Performed by: INTERNAL MEDICINE

## 2022-08-23 PROCEDURE — 76705 ECHO EXAM OF ABDOMEN: CPT

## 2022-08-23 PROCEDURE — 80053 COMPREHEN METABOLIC PANEL: CPT

## 2022-08-23 PROCEDURE — 700102 HCHG RX REV CODE 250 W/ 637 OVERRIDE(OP): Performed by: HOSPITALIST

## 2022-08-23 PROCEDURE — 700111 HCHG RX REV CODE 636 W/ 250 OVERRIDE (IP): Performed by: HOSPITALIST

## 2022-08-23 PROCEDURE — 770001 HCHG ROOM/CARE - MED/SURG/GYN PRIV*

## 2022-08-23 PROCEDURE — 99223 1ST HOSP IP/OBS HIGH 75: CPT | Performed by: HOSPITALIST

## 2022-08-23 PROCEDURE — A9270 NON-COVERED ITEM OR SERVICE: HCPCS | Performed by: HOSPITALIST

## 2022-08-23 RX ORDER — OXYCODONE HYDROCHLORIDE 5 MG/1
5 TABLET ORAL
Status: DISCONTINUED | OUTPATIENT
Start: 2022-08-23 | End: 2022-09-01

## 2022-08-23 RX ORDER — HYDROMORPHONE HYDROCHLORIDE 1 MG/ML
0.5 INJECTION, SOLUTION INTRAMUSCULAR; INTRAVENOUS; SUBCUTANEOUS
Status: DISCONTINUED | OUTPATIENT
Start: 2022-08-23 | End: 2022-08-23

## 2022-08-23 RX ORDER — SODIUM CHLORIDE, SODIUM LACTATE, POTASSIUM CHLORIDE, CALCIUM CHLORIDE 600; 310; 30; 20 MG/100ML; MG/100ML; MG/100ML; MG/100ML
INJECTION, SOLUTION INTRAVENOUS CONTINUOUS
Status: DISCONTINUED | OUTPATIENT
Start: 2022-08-23 | End: 2022-09-01

## 2022-08-23 RX ORDER — HYDROMORPHONE HYDROCHLORIDE 1 MG/ML
0.5 INJECTION, SOLUTION INTRAMUSCULAR; INTRAVENOUS; SUBCUTANEOUS
Status: DISCONTINUED | OUTPATIENT
Start: 2022-08-23 | End: 2022-08-30

## 2022-08-23 RX ORDER — OXYCODONE HYDROCHLORIDE 10 MG/1
10 TABLET ORAL
Status: DISCONTINUED | OUTPATIENT
Start: 2022-08-23 | End: 2022-08-30

## 2022-08-23 RX ADMIN — PIPERACILLIN SODIUM AND TAZOBACTAM SODIUM 3.38 G: 3; .375 INJECTION, POWDER, FOR SOLUTION INTRAVENOUS at 20:54

## 2022-08-23 RX ADMIN — PIPERACILLIN SODIUM AND TAZOBACTAM SODIUM 3.38 G: 3; .375 INJECTION, POWDER, FOR SOLUTION INTRAVENOUS at 06:07

## 2022-08-23 RX ADMIN — HYDROMORPHONE HYDROCHLORIDE 0.5 MG: 1 INJECTION, SOLUTION INTRAMUSCULAR; INTRAVENOUS; SUBCUTANEOUS at 00:53

## 2022-08-23 RX ADMIN — OXYCODONE HYDROCHLORIDE 10 MG: 10 TABLET ORAL at 16:19

## 2022-08-23 RX ADMIN — HYDROMORPHONE HYDROCHLORIDE 0.5 MG: 1 INJECTION, SOLUTION INTRAMUSCULAR; INTRAVENOUS; SUBCUTANEOUS at 08:21

## 2022-08-23 RX ADMIN — PIPERACILLIN SODIUM AND TAZOBACTAM SODIUM 3.38 G: 3; .375 INJECTION, POWDER, FOR SOLUTION INTRAVENOUS at 13:00

## 2022-08-23 RX ADMIN — POLYETHYLENE GLYCOL 3350 1 PACKET: 17 POWDER, FOR SOLUTION ORAL at 16:19

## 2022-08-23 RX ADMIN — HYDROMORPHONE HYDROCHLORIDE 0.5 MG: 1 INJECTION, SOLUTION INTRAMUSCULAR; INTRAVENOUS; SUBCUTANEOUS at 11:34

## 2022-08-23 RX ADMIN — ONDANSETRON 4 MG: 2 INJECTION INTRAMUSCULAR; INTRAVENOUS at 03:19

## 2022-08-23 RX ADMIN — DOCUSATE SODIUM 50 MG AND SENNOSIDES 8.6 MG 2 TABLET: 8.6; 5 TABLET, FILM COATED ORAL at 19:18

## 2022-08-23 RX ADMIN — ACETAMINOPHEN 650 MG: 325 TABLET ORAL at 20:54

## 2022-08-23 RX ADMIN — HYDROMORPHONE HYDROCHLORIDE 0.5 MG: 1 INJECTION, SOLUTION INTRAMUSCULAR; INTRAVENOUS; SUBCUTANEOUS at 13:33

## 2022-08-23 RX ADMIN — OXYCODONE 5 MG: 5 TABLET ORAL at 10:24

## 2022-08-23 RX ADMIN — MAGNESIUM HYDROXIDE 30 ML: 400 SUSPENSION ORAL at 16:19

## 2022-08-23 RX ADMIN — SODIUM CHLORIDE, POTASSIUM CHLORIDE, SODIUM LACTATE AND CALCIUM CHLORIDE: 600; 310; 30; 20 INJECTION, SOLUTION INTRAVENOUS at 16:07

## 2022-08-23 RX ADMIN — HYDROMORPHONE HYDROCHLORIDE 0.5 MG: 1 INJECTION, SOLUTION INTRAMUSCULAR; INTRAVENOUS; SUBCUTANEOUS at 03:19

## 2022-08-23 RX ADMIN — HYDROMORPHONE HYDROCHLORIDE 0.5 MG: 1 INJECTION, SOLUTION INTRAMUSCULAR; INTRAVENOUS; SUBCUTANEOUS at 06:03

## 2022-08-23 RX ADMIN — OXYCODONE 5 MG: 5 TABLET ORAL at 05:33

## 2022-08-23 RX ADMIN — ACETAMINOPHEN 650 MG: 325 TABLET ORAL at 03:19

## 2022-08-23 RX ADMIN — SODIUM CHLORIDE, POTASSIUM CHLORIDE, SODIUM LACTATE AND CALCIUM CHLORIDE: 600; 310; 30; 20 INJECTION, SOLUTION INTRAVENOUS at 08:24

## 2022-08-23 RX ADMIN — OXYCODONE HYDROCHLORIDE 10 MG: 10 TABLET ORAL at 22:40

## 2022-08-23 RX ADMIN — OXYCODONE HYDROCHLORIDE 10 MG: 10 TABLET ORAL at 19:18

## 2022-08-23 ASSESSMENT — PAIN SCALES - PAIN ASSESSMENT IN ADVANCED DEMENTIA (PAINAD)
BREATHING: NORMAL
CONSOLABILITY: DISTRACTED OR REASSURED BY VOICE/TOUCH
BODYLANGUAGE: TENSE, DISTRESSED PACING, FIDGETING
FACIALEXPRESSION: SAD, FRIGHTENED, FROWN

## 2022-08-23 ASSESSMENT — ENCOUNTER SYMPTOMS
FEVER: 1
SHORTNESS OF BREATH: 0
ABDOMINAL PAIN: 1
NAUSEA: 1

## 2022-08-23 ASSESSMENT — PAIN DESCRIPTION - PAIN TYPE
TYPE: ACUTE PAIN

## 2022-08-23 ASSESSMENT — PAIN SCALES - WONG BAKER: WONGBAKER_NUMERICALRESPONSE: HURTS A LITTLE MORE

## 2022-08-23 NOTE — CONSULTS
Hospital Medicine Consultation    Date of Service  8/23/2022    Referring Physician  Jey Shepherd M.D.    Consulting Physician  Billy Austin M.D.    Reason for Consultation  Fever and sepsis    History of Presenting Illness  32 y.o. female who presented 8/22/2022 with abdominal pain and fever. Ms. Webber has previously known medical conditions that had an undergone in vitro fertilization and developed abdominal pain July 27 was found to have both a tubal and intrauterine pregnancy and underwent salpingectomy.  Intraoperatively she was found to have acute appendicitis therefore she also underwent appendectomy during that surgery.  Subsequently the intrauterine pregnancy was found to not be viable therefore underwent dilation and curettage on 8/6/2022.  On around 8/16/2022 she developed body aches, chills, fatigue with worsening abdominal pain.  Presented to urgent care where a COVID swab was negative and she was referred to the emergency room she also reported vaginal bleeding.  She underwent CAT scan of the abdomen pelvis in the emergency room revealing small to moderate ascites as well as hepatosplenomegaly.  She had a normal white count though elevated lactic acid at 3.3.  She did have a positive urinalysis though no urinary symptoms.  Given her hypotension that was refractory to IV fluids, she was admitted to the intensive care unit with a working diagnosis of septic shock.  Gynecology and surgery were consulted.    8/23/2022: Patient seen and evaluated in the ICU.  He continues to have significant abdominal pain now is more left-sided in nature whereas yesterday it was diffuse.  She is being scheduled for IR for drainage of the ascitic fluid.  She remains on IV Zosyn.  Cultures remain pending.  IV fluids have been restarted as she is NPO.    Review of Systems  Review of Systems   Constitutional:  Positive for fever.   Respiratory:  Negative for shortness of breath.    Cardiovascular:  Negative for chest  "pain.   Gastrointestinal:  Positive for abdominal pain and nausea.   All other systems reviewed and are negative.    Past Medical History   has a past medical history of Vitamin D deficiency.    Surgical History   has a past surgical history that includes primary c section (N/A, 3/18/2021); pr lap,rmv  adnexal structure (Right, 7/27/2022); pr lap,diagnostic abdomen (N/A, 7/27/2022); and pr lap,appendectomy (N/A, 7/27/2022).    Family History  family history includes Cancer in her father, maternal grandmother, and paternal grandmother; Heart Disease (age of onset: 70) in her maternal grandfather; Thyroid in her mother.    Social History   reports that she has never smoked. She has never used smokeless tobacco. She reports that she does not currently use alcohol. She reports that she does not use drugs.    Medications  Prior to Admission Medications   Prescriptions Last Dose Informant Patient Reported? Taking?   IBUPROFEN PO   Yes No   Sig: Take  by mouth.   NON SPECIFIED   Yes No   Sig: Birth control pill   ciprofloxacin (CIPRO) 500 MG Tab   No No   Sig: Take 1 Tablet by mouth 2 times a day for 7 days.      Facility-Administered Medications: None       Allergies  Allergies   Allergen Reactions    Onion Hives and Swelling     Patient states \"throat porter closes\"       Physical Exam  Temp:  [36.2 °C (97.1 °F)-39.6 °C (103.3 °F)] 36.2 °C (97.1 °F)  Pulse:  [] 97  Resp:  [16-36] 16  BP: ()/(47-76) 103/60  SpO2:  [89 %-100 %] 93 %    Physical Exam  Vitals and nursing note reviewed.   Constitutional:       General: She is in acute distress.      Appearance: She is toxic-appearing.   HENT:      Head: Normocephalic and atraumatic.      Mouth/Throat:      Mouth: Mucous membranes are dry.      Pharynx: Oropharynx is clear.   Eyes:      General: No scleral icterus.     Conjunctiva/sclera: Conjunctivae normal.   Cardiovascular:      Rate and Rhythm: Regular rhythm. Tachycardia present.      Heart sounds: No murmur " heard.  Pulmonary:      Effort: Pulmonary effort is normal.      Breath sounds: Normal breath sounds.   Abdominal:      General: There is distension.      Comments: Significant left lower quadrant tenderness to light touch   Musculoskeletal:      Cervical back: Normal range of motion and neck supple.      Right lower leg: No edema.      Left lower leg: No edema.   Skin:     General: Skin is warm and dry.   Neurological:      General: No focal deficit present.      Mental Status: She is alert and oriented to person, place, and time.   Psychiatric:         Mood and Affect: Mood normal.         Behavior: Behavior normal.         Thought Content: Thought content normal.         Judgment: Judgment normal.       Fluids      Laboratory  Recent Labs     08/22/22  1059 08/22/22  1600 08/23/22  0601   WBC 6.4 5.7 14.3*   RBC 4.68 4.01* 4.07*   HEMOGLOBIN 12.9 11.1* 11.3*   HEMATOCRIT 38.0 32.4* 33.1*   MCV 81.2* 80.8* 81.3*   MCH 27.6 27.7 27.8   MCHC 33.9 34.3 34.1   RDW 42.9 42.4 43.7   PLATELETCT 327 253 270   MPV 8.8* 8.8* 8.5*     Recent Labs     08/22/22  1059 08/23/22  0601   SODIUM 133* 132*   POTASSIUM 3.8 4.2   CHLORIDE 97 96   CO2 21 24   GLUCOSE 129* 137*   BUN 9 6*   CREATININE 0.73 0.73   CALCIUM 9.1 8.2*     Recent Labs     08/22/22  1945   INR 1.40*                 Imaging  CT-ABDOMEN-PELVIS WITH   Final Result      1.  Hepatosplenomegaly.      2.  Small to moderate ascites throughout the abdomen and pelvis.      3.  Bilateral ovarian cysts, right greater than left.      DX-CHEST-PORTABLE (1 VIEW)   Final Result      No acute cardiac or pulmonary abnormalities are identified.      IR-CONSULT AND TREAT    (Results Pending)       Assessment/Plan  * Septic shock (HCC)- (present on admission)  Assessment & Plan  This is Septic shock Present on admission  SIRS criteria identified on my evaluation include: Fever, with temperature greater than 101 deg F  Indicators of septic shock include: Severe sepsis present and  persistent hypotension after 30 ml/kg completed.   Sources is: abdominal source  Sepsis protocol initiated  Crystalloid Fluid Administration: Fluid resuscitation ordered per standard protocol - 30 mL/kg per current or ideal body weight  IV antibiotics as appropriate for source of sepsis IV Zosyn  Reassessment: I have reassessed the patient's hemodynamic status  IR consulted for aspiration of fluid for culture/cells. Hold Lovenox. Restart IV fluids.       Hyponatremia- (present on admission)  Assessment & Plan  hypovolemic    Hepatosplenomegaly- (present on admission)  Assessment & Plan  Noted on CT scan    Generalized abdominal pain- (present on admission)  Assessment & Plan  Initially diffuse now left lower quadrant.  Increase the frequency of dilaudid to hourly while NPO

## 2022-08-23 NOTE — CARE PLAN
The patient is Stable - Low risk of patient condition declining or worsening    Shift Goals  Clinical Goals: Pain management, paracentesis  Patient Goals: Pain management  Family Goals: n/a    Progress made toward(s) clinical / shift goals:  Pt assessed for pain regularly and medicated PRN per MAR.   Pt educated regarding plan of care and medications. All questions answered.      Patient is not progressing towards the following goals:      Problem: Pain - Standard  Goal: Alleviation of pain or a reduction in pain to the patient’s comfort goal  Outcome: Not Progressing     Problem: Knowledge Deficit - Standard  Goal: Patient and family/care givers will demonstrate understanding of plan of care, disease process/condition, diagnostic tests and medications  Outcome: Progressing

## 2022-08-23 NOTE — PROGRESS NOTES
"  DATE: 8/23/2022    Hospital Day 2  infection of unknown origin, post operative a month from appendectomy .    INTERVAL EVENTS:  Ongoing abdominal pain, more left than right today.  More distended today. No peritonitis.  Plan for IR today.     PHYSICAL EXAMINATION:  Vital Signs: /68   Pulse 93   Temp 36.6 °C (97.9 °F)   Resp 18   Ht 1.702 m (5' 7\")   Wt 83 kg (183 lb)   SpO2 96%     Awake and alert  Breathing comfortably  Abdomen more distended today, painful on the left, no peritonitis.     LABORATORY VALUES:   Recent Labs     08/22/22  1059 08/22/22  1600 08/23/22  0601   WBC 6.4 5.7 14.3*   RBC 4.68 4.01* 4.07*   HEMOGLOBIN 12.9 11.1* 11.3*   HEMATOCRIT 38.0 32.4* 33.1*   MCV 81.2* 80.8* 81.3*   MCH 27.6 27.7 27.8   MCHC 33.9 34.3 34.1   RDW 42.9 42.4 43.7   PLATELETCT 327 253 270   MPV 8.8* 8.8* 8.5*     Recent Labs     08/22/22  1059 08/23/22  0601   SODIUM 133* 132*   POTASSIUM 3.8 4.2   CHLORIDE 97 96   CO2 21 24   GLUCOSE 129* 137*   BUN 9 6*   CREATININE 0.73 0.73   CALCIUM 9.1 8.2*     Recent Labs     08/22/22  1059 08/22/22  1945 08/23/22  0601   ASTSGOT 9*  --  11*   ALTSGPT 8  --  6   TBILIRUBIN 0.5  --  0.6   ALKPHOSPHAT 74  --  49   GLOBULIN 4.0*  --  3.2   INR  --  1.40*  --      Recent Labs     08/22/22  1945   INR 1.40*        IMAGING:   US-PELVIC TRANSABDOMINAL ONLY   Final Result      1.  Limited transabdominal study due to pain as well as patient refusing endovaginal exam.   2.  Note definite uterine abnormality seen.   3.  Ovaries are not visualized, obscured by bowel gas.      CT-ABDOMEN-PELVIS WITH   Final Result      1.  Hepatosplenomegaly.      2.  Small to moderate ascites throughout the abdomen and pelvis.      3.  Bilateral ovarian cysts, right greater than left.      DX-CHEST-PORTABLE (1 VIEW)   Final Result      No acute cardiac or pulmonary abnormalities are identified.      US-PARACENTESIS, ABD WITH IMAGING    (Results Pending)       ASSESSMENT AND PLAN:   Generalized " abdominal pain- (present on admission)  Assessment & Plan  Post operative four weeks.  Unlikely to be an abscess or occult bowel injury without any intraperitoneal gas or abscess characterized on CT.   Recommend sending C Diff and O/P on stool.   Recommend IR evaluation for possible sampling of ascites/fluid and culture of fluid.    No recommendation for acute operative intervention.   Will follow along.    From my perspective ok to have clear liquids until midnight and then NPO for possible IR culture/fluid sampling/drainage.            ____________________________________     Dona Maldonado M.D.    DD: 8/23/2022  12:33 PM

## 2022-08-23 NOTE — ASSESSMENT & PLAN NOTE
-Peritoneal abdominal pain of unclear etiology  -Has been evaluated by OB-gyn and general surgery.  Dr. Maldonado with surgery recommends sampling of the peritoneal fluid.  There is insufficient fluid for me to feel comfortable performing a paracentesis, so I agree with IR sampling  -zosyn  -stool studies pending, although she denies lisa diarrhea

## 2022-08-23 NOTE — CONSULTS
Critical Care Consultation    Date of consult: 8/22/2022    Referring Physician  Dona Kenney M.D.    Reason for Consultation  Sepsis, abdominal pain    History of Presenting Illness  32 y.o. female who presented 8/22/2022 with abdominal pain, n/v. She was undergoing IVF and was 5 weeks pregnant, when on 7/27 she presented with acute abdominal pain.  She was thought to have both a tubal and intrauterine pregnancy and was taken to the OR for management of the tubal pregnancy. Intraoperatively, she was found to have acute appendicitis so Dr. Lux was consulted who performed appendectomy. Pathology confirmed the diagnosis of appendicitis. At follow-up, the IUP was diagnosed as arrested and she underwent D&C on 8/6.  Brunilda reports she was recovering well and had actually started going to the gym.  5 days ago she felt poorly and had abdominal pain, but assumed she overdid it at the gym. She felt better, but then yesterday developed acute worsening diffuse abdominal pain.  Today she had nausea and vomiting as well.  Her stool has been slightly looser than usual, but she denies lisa diarrhea.  No significant vaginal discharge.  Has not had dysuria. She feels generally very poorly, but denies dizziness.     In the ER she was found to have a peritoneal abdominal exam. Dr. Olivares performed a pelvic exam which was not c/w cervicitis.  There was some bloody serous discharge.  CT of the abdomen showed some ascites and hepatosplenomegaly, but no abscess or other evidence of a surgical complication. Brunilda's blood pressure is low with MAPs hovering around 65.  Lactic was 3.3 but cleared with fluid. Temp spiked to 103 in the ER.       Code Status  Full Code    Review of Systems  ROS    Past Medical History   has a past medical history of Vitamin D deficiency.    Surgical History   has a past surgical history that includes primary c section (N/A, 3/18/2021); pr lap,rmv  adnexal structure (Right, 7/27/2022); pr lap,diagnostic  abdomen (N/A, 7/27/2022); and pr lap,appendectomy (N/A, 7/27/2022).    Family History  family history includes Cancer in her father, maternal grandmother, and paternal grandmother; Heart Disease (age of onset: 70) in her maternal grandfather; Thyroid in her mother.    Social History   reports that she has never smoked. She has never used smokeless tobacco. She reports that she does not currently use alcohol. She reports that she does not use drugs.    Medications  Home Medications       Reviewed by Emanuel Landers R.N. (Registered Nurse) on 08/22/22 at 1901  Med List Status: Complete     Medication Last Dose Status   ciprofloxacin (CIPRO) 500 MG Tab  Active   IBUPROFEN PO  Active   NON SPECIFIED  Active                  Current Facility-Administered Medications   Medication Dose Route Frequency Provider Last Rate Last Admin    lactated ringers infusion   Intravenous Continuous Dona Kenney M.D. 250 mL/hr at 08/22/22 1926 New Bag at 08/22/22 1926    Pharmacy Consult Request  1 Each Other PHARMACY TO DOSE Viridiana Olivares M.D.        piperacillin-tazobactam (Zosyn) 3.375 g in  mL IVPB  3.375 g Intravenous Q8HRS Dona Kenney M.D.        norepinephrine (Levophed) 8 mg in 250 mL NS infusion (premix)  0-30 mcg/min Intravenous Continuous Dona Kenney M.D.   Dose not Required at 08/22/22 1820    And    vasopressin (VASOSTRICT) 20 Units in  mL Infusion  0.03 Units/min Intravenous Continuous Dona Kenney M.D.   Dose not Required at 08/22/22 1730    senna-docusate (PERICOLACE or SENOKOT S) 8.6-50 MG per tablet 2 Tablet  2 Tablet Oral BID Dona Kenney M.D.        And    polyethylene glycol/lytes (MIRALAX) PACKET 1 Packet  1 Packet Oral QDAY PRN Dona Kenney M.D.        And    magnesium hydroxide (MILK OF MAGNESIA) suspension 30 mL  30 mL Oral QDAY PRN Dona Kenney M.D.        And    bisacodyl (DULCOLAX) suppository 10 mg  10 mg Rectal QDAY PRN Dona Kenney M.D.        [START ON 8/23/2022]  "enoxaparin (Lovenox) inj 40 mg  40 mg Subcutaneous DAILY AT 1800 Dona Kenney M.D.        acetaminophen (Tylenol) tablet 650 mg  650 mg Oral Q6HRS PRN Dona Kenney M.D.        ondansetron (ZOFRAN) syringe/vial injection 4 mg  4 mg Intravenous Q4HRS PRN Dona Kenney M.D.        ondansetron (ZOFRAN ODT) dispertab 4 mg  4 mg Oral Q4HRS PRN Dona Kenney M.D.        promethazine (PHENERGAN) tablet 12.5-25 mg  12.5-25 mg Oral Q4HRS PRN Dona Kenney M.D.        promethazine (PHENERGAN) suppository 12.5-25 mg  12.5-25 mg Rectal Q4HRS PRN Dona Kenney M.D.        Pharmacy Consult Request ...Pain Management Review 1 Each  1 Each Other PHARMACY TO DOSE Dona Kenney M.D.        oxyCODONE immediate-release (ROXICODONE) tablet 2.5 mg  2.5 mg Oral Q3HRS PRN Dona Kenney M.D.        Or    oxyCODONE immediate-release (ROXICODONE) tablet 5 mg  5 mg Oral Q3HRS PRN Dona Kenney M.D.        Or    HYDROmorphone (Dilaudid) injection 0.5 mg  0.5 mg Intravenous Q2HRS PRN Dona Kenney M.D.   0.5 mg at 08/22/22 1926       Allergies  Allergies   Allergen Reactions    Onion Hives and Swelling     Patient states \"throat porter closes\"       Vital Signs last 24 hours  Temp:  [36.7 °C (98 °F)-39.6 °C (103.3 °F)] 36.9 °C (98.4 °F)  Pulse:  [108-146] 108  Resp:  [19-36] 23  BP: ()/(47-70) 94/54  SpO2:  [89 %-100 %] 94 %    Physical Exam  Physical Exam    Fluids    Intake/Output Summary (Last 24 hours) at 8/22/2022 2128  Last data filed at 8/22/2022 2000  Gross per 24 hour   Intake 3141.67 ml   Output --   Net 3141.67 ml       Laboratory  Recent Results (from the past 48 hour(s))   POCT Influenza A/B    Collection Time: 08/21/22 12:23 PM   Result Value Ref Range    Rapid Influenza A-B Negative     Internal Control Positive Positive     Internal Control Negative Negative    POCT SARS-COV Antigen JOSE MIGUEL (Symptomatic only)    Collection Time: 08/21/22  1:19 PM   Result Value Ref Range    Internal  Valid     " SARS-COV ANTIGEN JOSE MIGUEL Negative Negative, Indeterminate, None Detected, Valid, Invalid, Pass   POCT Urinalysis    Collection Time: 08/21/22  1:19 PM   Result Value Ref Range    POC Color Orange Negative    POC Appearance Clear Negative    POC Leukocyte Esterase Small Negative    POC Nitrites Negative Negative    POC Urobiligen 0.2 Negative (0.2) mg/dL    POC Protein 100 Negative mg/dL    POC Urine PH 6.0 5.0 - 8.0    POC Blood Large Negative    POC Specific Gravity 1.025 <1.005 - >1.030    POC Ketones 160 Negative mg/dL    POC Bilirubin Small Negative mg/dL    POC Glucose Negative Negative mg/dL   CBC WITH DIFFERENTIAL    Collection Time: 08/22/22 10:59 AM   Result Value Ref Range    WBC 6.4 4.8 - 10.8 K/uL    RBC 4.68 4.20 - 5.40 M/uL    Hemoglobin 12.9 12.0 - 16.0 g/dL    Hematocrit 38.0 37.0 - 47.0 %    MCV 81.2 (L) 81.4 - 97.8 fL    MCH 27.6 27.0 - 33.0 pg    MCHC 33.9 33.6 - 35.0 g/dL    RDW 42.9 35.9 - 50.0 fL    Platelet Count 327 164 - 446 K/uL    MPV 8.8 (L) 9.0 - 12.9 fL    Neutrophils-Polys 74.00 (H) 44.00 - 72.00 %    Lymphocytes 20.30 (L) 22.00 - 41.00 %    Monocytes 4.70 0.00 - 13.40 %    Eosinophils 0.20 0.00 - 6.90 %    Basophils 0.50 0.00 - 1.80 %    Immature Granulocytes 0.30 0.00 - 0.90 %    Nucleated RBC 0.00 /100 WBC    Neutrophils (Absolute) 4.74 2.00 - 7.15 K/uL    Lymphs (Absolute) 1.30 1.00 - 4.80 K/uL    Monos (Absolute) 0.30 0.00 - 0.85 K/uL    Eos (Absolute) 0.01 0.00 - 0.51 K/uL    Baso (Absolute) 0.03 0.00 - 0.12 K/uL    Immature Granulocytes (abs) 0.02 0.00 - 0.11 K/uL    NRBC (Absolute) 0.00 K/uL   COMP METABOLIC PANEL    Collection Time: 08/22/22 10:59 AM   Result Value Ref Range    Sodium 133 (L) 135 - 145 mmol/L    Potassium 3.8 3.6 - 5.5 mmol/L    Chloride 97 96 - 112 mmol/L    Co2 21 20 - 33 mmol/L    Anion Gap 15.0 7.0 - 16.0    Glucose 129 (H) 65 - 99 mg/dL    Bun 9 8 - 22 mg/dL    Creatinine 0.73 0.50 - 1.40 mg/dL    Calcium 9.1 8.5 - 10.5 mg/dL    AST(SGOT) 9 (L) 12 - 45 U/L     ALT(SGPT) 8 2 - 50 U/L    Alkaline Phosphatase 74 30 - 99 U/L    Total Bilirubin 0.5 0.1 - 1.5 mg/dL    Albumin 3.6 3.2 - 4.9 g/dL    Total Protein 7.6 6.0 - 8.2 g/dL    Globulin 4.0 (H) 1.9 - 3.5 g/dL    A-G Ratio 0.9 g/dL   LIPASE    Collection Time: 08/22/22 10:59 AM   Result Value Ref Range    Lipase 24 11 - 82 U/L   HCG QUAL SERUM    Collection Time: 08/22/22 10:59 AM   Result Value Ref Range    Beta-Hcg Qualitative Serum Negative Negative   ESTIMATED GFR    Collection Time: 08/22/22 10:59 AM   Result Value Ref Range    GFR (CKD-EPI) 113 >60 mL/min/1.73 m 2   Lactic Acid    Collection Time: 08/22/22 10:59 AM   Result Value Ref Range    Lactic Acid 2.1 (H) 0.5 - 2.0 mmol/L   URINALYSIS,CULTURE IF INDICATED    Collection Time: 08/22/22 12:55 PM    Specimen: Urine   Result Value Ref Range    Color DK Yellow     Character Cloudy (A)     Specific Gravity 1.030 <1.035    Ph 5.5 5.0 - 8.0    Glucose Negative Negative mg/dL    Ketones >=160 Negative mg/dL    Protein 100 (A) Negative mg/dL    Bilirubin Small (A) Negative    Urobilinogen, Urine 0.2 Negative    Nitrite Negative Negative    Leukocyte Esterase Moderate (A) Negative    Occult Blood Moderate (A) Negative    Micro Urine Req Microscopic    URINE MICROSCOPIC (W/UA)    Collection Time: 08/22/22 12:55 PM   Result Value Ref Range    WBC 20-50 (A) /hpf    RBC 10-20 (A) /hpf    Bacteria Negative None /hpf    Epithelial Cells Moderate (A) /hpf    Hyaline Cast 0-2 /lpf   Lactic Acid    Collection Time: 08/22/22  1:45 PM   Result Value Ref Range    Lactic Acid 3.3 (H) 0.5 - 2.0 mmol/L   Chlamydia/GC, PCR (Genital/Anal swab)    Collection Time: 08/22/22  3:30 PM   Result Value Ref Range    Source Endo/Cervical    Lactic Acid    Collection Time: 08/22/22  4:00 PM   Result Value Ref Range    Lactic Acid 1.4 0.5 - 2.0 mmol/L   CBC WITHOUT DIFFERENTIAL    Collection Time: 08/22/22  4:00 PM   Result Value Ref Range    WBC 5.7 4.8 - 10.8 K/uL    RBC 4.01 (L) 4.20 - 5.40 M/uL     Hemoglobin 11.1 (L) 12.0 - 16.0 g/dL    Hematocrit 32.4 (L) 37.0 - 47.0 %    MCV 80.8 (L) 81.4 - 97.8 fL    MCH 27.7 27.0 - 33.0 pg    MCHC 34.3 33.6 - 35.0 g/dL    RDW 42.4 35.9 - 50.0 fL    Platelet Count 253 164 - 446 K/uL    MPV 8.8 (L) 9.0 - 12.9 fL   Urinalysis    Collection Time: 08/22/22  7:30 PM    Specimen: Urine, Cath   Result Value Ref Range    Color Yellow     Character Clear     Specific Gravity >=1.045 (A) <1.035    Ph 6.5 5.0 - 8.0    Glucose Negative Negative mg/dL    Ketones 40 (A) Negative mg/dL    Protein 30 (A) Negative mg/dL    Bilirubin Negative Negative    Urobilinogen, Urine 0.2 Negative    Nitrite Negative Negative    Leukocyte Esterase Negative Negative    Occult Blood Small (A) Negative    Micro Urine Req Microscopic    URINE MICROSCOPIC (W/UA)    Collection Time: 08/22/22  7:30 PM   Result Value Ref Range    WBC 2-5 /hpf    RBC 5-10 (A) /hpf    Bacteria Negative None /hpf    Epithelial Cells Many (A) /hpf   Prothrombin time (INR)    Collection Time: 08/22/22  7:45 PM   Result Value Ref Range    PT 16.9 (H) 12.0 - 14.6 sec    INR 1.40 (H) 0.87 - 1.13   Cortisol    Collection Time: 08/22/22  7:45 PM   Result Value Ref Range    Cortisol 26.3 (H) 0.0 - 23.0 ug/dL   CRYPTO/GIARDIA RAPID ASSAY    Collection Time: 08/22/22  8:13 PM    Specimen: Stool   Result Value Ref Range    Significant Indicator NEG     Source STL     Site -     Ova And Parasites Antigen Eia -        Imaging  CT-ABDOMEN-PELVIS WITH   Final Result      1.  Hepatosplenomegaly.      2.  Small to moderate ascites throughout the abdomen and pelvis.      3.  Bilateral ovarian cysts, right greater than left.      DX-CHEST-PORTABLE (1 VIEW)   Final Result      No acute cardiac or pulmonary abnormalities are identified.      IR-CONSULT AND TREAT    (Results Pending)     Bedside TTE: Normal BiV function. No subcostal view due to abdominal pain.   Assessment/Plan  * Septic shock (HCC)- (present on admission)  Assessment &  Plan  This is Septic shock Present on admission  SIRS criteria identified on my evaluation include: Fever, with temperature greater than 101 deg F and Tachycardia, with heart rate greater than 90 BPM  Indicators of septic shock include: Severe sepsis present and persistent hypotension after 30 ml/kg completed.   Sources is: abdominal  Sepsis protocol initiated  Crystalloid Fluid Administration: Fluid resuscitation ordered per standard protocol - 30 mL/kg per current or ideal body weight  IV antibiotics as appropriate for source of sepsis  Reassessment: I have reassessed the patient's hemodynamic status    Abdominal sepsis, but the exact etiology is unclear  Zosyn given her recent hospital exposure  May require vasopressors, BP currently borderline with MAPs hovering just around 65    Generalized abdominal pain- (present on admission)  Assessment & Plan  -Peritoneal abdominal pain of unclear etiology  -Has been evaluated by OB-gyn and general surgery.  Dr. Maldonado with surgery recommends sampling of the peritoneal fluid.  There is insufficient fluid for me to feel comfortable performing a paracentesis, so I agree with IR sampling  -zosyn  -stool studies pending, although she denies lisa diarrhea      Contraception management  Assessment & Plan  Patient is on birth control at home with plans to remain on OCPs until she has recovered and able to try again with IVF.  She is unsure of the exact OCP.  Need to clarify this so we can continue it while inpatient    Hyponatremia  Assessment & Plan  Mild, trend    Hepatosplenomegaly  Assessment & Plan  CT shows this finding  Normal LFTs and CBC are reassuring      Discussed patient condition and risk of morbidity and/or mortality with RN, Pharmacy, Patient, and general surgery and obstetrics.    The patient remains critically ill with septic shock on the borderline of requiring vasopressors.  Critical care time = 75 minutes in directly providing and coordinating critical care  and extensive data review.  No time overlap and excludes procedures.

## 2022-08-23 NOTE — ASSESSMENT & PLAN NOTE
Post operative four weeks.  Cont IV abx.  Possible abscess or infected hematoma, but fairly remote. Too small for IR drainage.    Recommend ongoing abx and re-image at 5-7 days to see if evolves into a collection amenable to drainage.  8/26 IR pelvic abscess drain placed.  8/28 Cultures positive for E Coli, sensitivities pending.  8/31 Interval removal drain.

## 2022-08-23 NOTE — ASSESSMENT & PLAN NOTE
"Initially diffuse now left lower quadrant.  Increase the frequency of dilaudid to hourly while NPO\"    Ordered KUB. Ileus  Moving bowels unlikely SBO  Downgrade diet to clears, give PPI, antiemetics, bowel rest.  Surgery fololwing.  "

## 2022-08-23 NOTE — ASSESSMENT & PLAN NOTE
This is Septic shock Present on admission  SIRS criteria identified on my evaluation include: Fever, with temperature greater than 101 deg F and Tachycardia, with heart rate greater than 90 BPM  Indicators of septic shock include: Severe sepsis present and persistent hypotension after 30 ml/kg completed.   Sources is: abdominal  Sepsis protocol initiated  Crystalloid Fluid Administration: Fluid resuscitation ordered per standard protocol - 30 mL/kg per current or ideal body weight  IV antibiotics as appropriate for source of sepsis  Reassessment: I have reassessed the patient's hemodynamic status    Abdominal sepsis, but the exact etiology is unclear  Zosyn given her recent hospital exposure  May require vasopressors, BP currently borderline with MAPs hovering just around 65

## 2022-08-23 NOTE — CONSULTS
GYN: HD#1 consult  Brunilda states feeling little better than yesterday. Still having pain but seems to be changing locations. More bloated today and burping. Little flatus. No fever/n/v/d. Hungry. Declined TVUS and awaiting IR sampling of ascites.   Blood cultures negative to date.   VSS improving, AF  Labs reviewed.  Abd: more distended today, tenderness greater in midline, epigastric and periumbilical.  TAUS: limited information provided, declined TVUS  A: Infectious abd process/sepsis - neg cultures to date/on abx/afebrile - possible mild improvement.  ?etiology? - has hx neg ID testing in office on multiple occasions - monogamous relationship - had infectious appy process - possible GI related/hepatosplenomegaly noted - possible infected remaining left tube but only 17mm cyst on left on CT.  P: Cont abx/support/pain control - await IR sampling - appreciate general surgery input/management along with hospitalists.  Will follow progress.  /niravw

## 2022-08-23 NOTE — ASSESSMENT & PLAN NOTE
Patient is on birth control at home with plans to remain on OCPs until she has recovered and able to try again with IVF.  She is unsure of the exact OCP.  Need to clarify this so we can continue it while inpatient

## 2022-08-23 NOTE — CONSULTS
"  DATE OF CONSULTATION:  8/22/2022     REFERRING PHYSICIAN:   Viridiana Olivares M.D.     CONSULTING PHYSICIAN:  Dona Maldonado M.D.     REASON FOR CONSULTATION:  I have been asked by  to see the patient in surgical consultation for evaluation of abdominal pain post operatively.    HISTORY OF PRESENT ILLNESS: The patient is a 32 year-old White woman who presents to the Emergency Department with a 1- day history of severe, generalized, and diffuse generalized abdominal pain. The pain is associated with nausea, vomiting, fever, and malaise. The patient denies any recent or intercurrent illness. She underwent an appendectomy on July 27 with Dr Lux.  She also underwent a D&C following that.  She presents with several days of diarrhea, 2 days of body aches and only about 24 hrs of severe abdominal pain.  She states the pain is intermittent severe cramping.  She has had hypotension and lactic acidosis here, although her lactate has cleared.  She has a CT scan with ascites and no abscess or evidence of surgical complication.  She is febrile and has been intermittently hypotensive.  She vomited once today.  She says she continues to have diarrhea. Pathology from her surgery showed acute appendicitis.     PAST MEDICAL HISTORY:  has a past medical history of Vitamin D deficiency.    PAST SURGICAL HISTORY:  has a past surgical history that includes primary c section (N/A, 3/18/2021); pr lap,rmv  adnexal structure (Right, 7/27/2022); pr lap,diagnostic abdomen (N/A, 7/27/2022); and pr lap,appendectomy (N/A, 7/27/2022).    ALLERGIES:   Allergies   Allergen Reactions    Onion Hives and Swelling     Patient states \"throat porter closes\"       CURRENT MEDICATIONS:    Home Medications    **Home medications have not yet been reviewed for this encounter**         FAMILY HISTORY: family history includes Cancer in her father, maternal grandmother, and paternal grandmother; Heart Disease (age of onset: 70) in her maternal " grandfather; Thyroid in her mother.    SOCIAL HISTORY:  reports that she has never smoked. She has never used smokeless tobacco. She reports that she does not currently use alcohol. She reports that she does not use drugs.    REVIEW OF SYSTEMS: Comprehensive review of systems is negative with the exception of the aforementioned HPI, PMH, and PSH bullets in accordance with CMS guidelines.    PHYSICAL EXAMINATION:    Physical Exam  Vitals and nursing note reviewed. Exam conducted with a chaperone present.   Constitutional:       Appearance: She is ill-appearing.   HENT:      Head: Normocephalic and atraumatic.      Right Ear: External ear normal.      Left Ear: External ear normal.      Nose: Nose normal.      Mouth/Throat:      Mouth: Mucous membranes are dry.      Pharynx: Oropharynx is clear.   Eyes:      Pupils: Pupils are equal, round, and reactive to light.   Cardiovascular:      Rate and Rhythm: Regular rhythm. Tachycardia present.      Pulses: Normal pulses.   Pulmonary:      Effort: Pulmonary effort is normal.      Breath sounds: Normal breath sounds.   Abdominal:      General: There is distension.      Palpations: Abdomen is soft.      Tenderness: There is abdominal tenderness. There is guarding.   Genitourinary:     General: Normal vulva.   Musculoskeletal:         General: No swelling or tenderness.      Cervical back: Normal range of motion and neck supple. No tenderness.   Skin:     General: Skin is warm and dry.      Capillary Refill: Capillary refill takes less than 2 seconds.      Coloration: Skin is pale.   Neurological:      General: No focal deficit present.      Mental Status: She is alert and oriented to person, place, and time. Mental status is at baseline.   Psychiatric:         Mood and Affect: Mood normal.         Behavior: Behavior normal.       LABORATORY VALUES:   Recent Labs     08/22/22  1059 08/22/22  1600   WBC 6.4 5.7   RBC 4.68 4.01*   HEMOGLOBIN 12.9 11.1*   HEMATOCRIT 38.0 32.4*    MCV 81.2* 80.8*   MCH 27.6 27.7   MCHC 33.9 34.3   RDW 42.9 42.4   PLATELETCT 327 253   MPV 8.8* 8.8*     Recent Labs     08/22/22  1059   SODIUM 133*   POTASSIUM 3.8   CHLORIDE 97   CO2 21   GLUCOSE 129*   BUN 9   CREATININE 0.73   CALCIUM 9.1     Recent Labs     08/22/22  1059   ASTSGOT 9*   ALTSGPT 8   TBILIRUBIN 0.5   ALKPHOSPHAT 74   GLOBULIN 4.0*            IMAGING:   CT-ABDOMEN-PELVIS WITH   Final Result      1.  Hepatosplenomegaly.      2.  Small to moderate ascites throughout the abdomen and pelvis.      3.  Bilateral ovarian cysts, right greater than left.      DX-CHEST-PORTABLE (1 VIEW)   Final Result      No acute cardiac or pulmonary abnormalities are identified.      IR-CONSULT AND TREAT    (Results Pending)       ASSESSMENT AND PLAN:     Generalized abdominal pain- (present on admission)  Assessment & Plan  Post operative four weeks.  Unlikely to be an abscess or occult bowel injury without any intraperitoneal gas or abscess characterized on CT.   Recommend sending C Diff and O/P on stool.   Recommend IR evaluation for possible sampling of ascites/fluid and culture of fluid.    No recommendation for acute operative intervention.   Will follow along.    From my perspective ok to have clear liquids until midnight and then NPO for possible IR culture/fluid sampling/drainage.            ____________________________________     Dona Maldonado M.D.    DD: 8/22/2022  5:02 PM

## 2022-08-23 NOTE — CARE PLAN
The patient is Watcher - Medium risk of patient condition declining or worsening    Shift Goals  Clinical Goals: reduce pain  Patient Goals: reduce pain  Family Goals: n/a    Progress made toward(s) clinical / shift goals:  pain management, still having frequent pain requiring frequent medication administration    Patient is not progressing towards the following goals:      Problem: Pain - Standard  Goal: Alleviation of pain or a reduction in pain to the patient’s comfort goal  Outcome: Not Met     Problem: Knowledge Deficit - Standard  Goal: Patient and family/care givers will demonstrate understanding of plan of care, disease process/condition, diagnostic tests and medications  Outcome: Progressing     Problem: Hemodynamics  Goal: Patient's hemodynamics, fluid balance and neurologic status will be stable or improve  Outcome: Progressing

## 2022-08-23 NOTE — CONSULTS
DATE OF SERVICE:  2022     GYNECOLOGICAL CONSULT     I was asked by Dr. Viridiana Olivares to consult on the patient and due to acute   pain, possible gynecological in origin.  The patient is a patient of Dr. Andres Gilbert and is known to our practice.     HISTORY OF PRESENT ILLNESS:  This is a very pleasant 32-year-old  2,   para 1, TAB 1 female who has had a recent history that includes transfer of 2   frozen embryos on 2022 resulting in a patel pregnancy where she   presented to the emergency room on 2022 with extreme abdominal pain, at   which time ultrasound indicated intrauterine pregnancy and a possible right   heterotopic pregnancy.  The patient was taken to the operating room.    Laparoscopy was performed and acute appendicitis was encountered with abdomen   and pelvis full of purulent material.  Dr. Shaylee Lux was called in at that   time and performed an appendectomy and after thorough irrigation of the   contents and performing a right salpingectomy,  the patient was admitted for   IV antibiotics and discharged.  The patient was seen and followup in the   office.  At which time, her intrauterine pregnancy was diagnosed as arrested   and after bleeding for several days, she underwent completion of its D and C   on 2022 without complication.  The patient was doing well and otherwise   good state of health until last Wednesday, at which time, she had malaise and   arthralgias, this subsided.  She then stated that yesterday she woke up with   the same and therefore was seen in urgent care.  They thought she had a   possible urinary tract infection, discharged her to home.  She then notes   waking up this morning and then have a very low appetite and cannot eat her   breakfast.  She then experienced a sudden onset of abdominal pain that was   diffuse throughout her entire abdomen.  She indicates left mid quadrant and   right upper quadrant as the most intense areas of  discomfort.  She did have   one vomiting episode last night, which was elicited by history and is   otherwise feeling lethargic and in pain and also noted to be febrile.  In the   emergency room, she is tachycardic and febrile and complaints of significant   pain with any movement whatsoever.  She denies any chest pain, cough, sinus   pressure, upper respiratory tract infection, leg pain or lisa dysuria.  She   does note, it is kind of painful to pee but otherwise has no other complaints.    Dr. Olivares's pelvic examination did not elicit any cervical motion   tenderness nor has patient noted any kind of purulent drainage from her cervix   or vaginal discharge.  She has been taking continuous birth control pills   since the D and C on  and has not taken any other medications at this   time.     PAST SURGICAL HISTORY:  Her surgical history is significant for a    section, a termination and appendix along with a right salpingectomy via   laparoscopy.     PAST MEDICAL HISTORY:  Unremarkable.     Review of laboratory data as well as CT findings and images were performed.    The CT did demonstrate hepatosplenomegaly, small-to-moderate ascites   throughout the abdomen, pelvis and bilateral ovarian cysts, right greater than   left; however, within the context of the report says 3 cm right adnexal cyst   and 1.8 cm left adnexal cyst.  Her white blood cell count is 6.4 thousand, H   and H 12.9 and 38, platelet count of 327.  Urine shows a small amount of   bilirubin, moderate occult blood, 100 protein, moderate leukocyte esterase,   20-50 white cells, moderate epithelial cells.  GC and chlamydia tests are   pending.  Lactate 2.1 initially, has gone from 3.3 and 1.4 on subsequent lab   draws.     PHYSICAL EXAMINATION:  VITAL SIGNS:  Temperature is 36.7 on admission, 39.6 upon exam, pulse rate   122, respirations 26, pulse ox 94 on room air, blood pressure 94/53.  GENERAL:  She is pale, obviously uncomfortable.   She has cooling pack on her   forehead.  She is keeping her knees up in order to not move her abdominal   area.  CHEST:  Clear.  ABDOMEN:  Diffusely tender.  No point tenderness noted anywhere, not seem to   be distended.  EXTREMITIES:  Soft, nontender.  No edema.     ASSESSMENT AND PLAN:  Unclear picture at this time as to what her actual   diagnosis is; however, there is possible acute infectious nature, possibly   related to a previous appendectomy.  Other differential includes acute   ruptured ovarian cyst; however, low likelihood due to the fact that she is on   continuous birth control pills.  Other diagnoses include possible pelvic   inflammatory disease, tubo-ovarian abscess; however, she does not have a tube   on that right side, which is where the right adnexal cyst is noted on the CT   scan.  Therefore, it was unclear whether this seems to be a gynecological or   other process.  Dr. Maldonado is in the emergency room at the same time.  She is   going to perform her evaluation and Dr. Olivares is going to admit her to the   intensive care unit to the intensivist.  We will start her on Rocephin and   Flagyl antibiotics, antipyretics and pain control.  If she does go to   Radiology for possible IR aspiration of the fluid, it is recommended that she   has a transvaginal sonogram as well to help elucidate the adnexal cysts that   were noted on CT scan.  We will certainly follow the patient while she was in   the hospital and appreciate Dr. Maldonado and Dr. Olivares's assistance in caring   for the patient.        ______________________________  MD ASUNCION KOO/PARUL/NIRU    DD:  08/22/2022 17:02  DT:  08/22/2022 19:51    Job#:  [id number]

## 2022-08-24 LAB
ALBUMIN SERPL BCP-MCNC: 2.7 G/DL (ref 3.2–4.9)
ALBUMIN/GLOB SERPL: 0.8 G/DL
ALP SERPL-CCNC: 64 U/L (ref 30–99)
ALT SERPL-CCNC: 10 U/L (ref 2–50)
ANION GAP SERPL CALC-SCNC: 10 MMOL/L (ref 7–16)
AST SERPL-CCNC: 12 U/L (ref 12–45)
BACTERIA STL CULT: NORMAL
BACTERIA UR CULT: NORMAL
BASOPHILS # BLD AUTO: 0.2 % (ref 0–1.8)
BASOPHILS # BLD: 0.04 K/UL (ref 0–0.12)
BILIRUB SERPL-MCNC: 0.4 MG/DL (ref 0.1–1.5)
BUN SERPL-MCNC: 6 MG/DL (ref 8–22)
C JEJUNI+C COLI AG STL QL: NORMAL
CALCIUM SERPL-MCNC: 8.1 MG/DL (ref 8.5–10.5)
CHLORIDE SERPL-SCNC: 91 MMOL/L (ref 96–112)
CO2 SERPL-SCNC: 24 MMOL/L (ref 20–33)
CREAT SERPL-MCNC: 0.57 MG/DL (ref 0.5–1.4)
EOSINOPHIL # BLD AUTO: 0.02 K/UL (ref 0–0.51)
EOSINOPHIL NFR BLD: 0.1 % (ref 0–6.9)
ERYTHROCYTE [DISTWIDTH] IN BLOOD BY AUTOMATED COUNT: 43.7 FL (ref 35.9–50)
GFR SERPLBLD CREATININE-BSD FMLA CKD-EPI: 125 ML/MIN/1.73 M 2
GLOBULIN SER CALC-MCNC: 3.3 G/DL (ref 1.9–3.5)
GLUCOSE SERPL-MCNC: 110 MG/DL (ref 65–99)
HCT VFR BLD AUTO: 31.2 % (ref 37–47)
HGB BLD-MCNC: 10.6 G/DL (ref 12–16)
IMM GRANULOCYTES # BLD AUTO: 0.24 K/UL (ref 0–0.11)
IMM GRANULOCYTES NFR BLD AUTO: 1.2 % (ref 0–0.9)
INR PPP: 1.38 (ref 0.87–1.13)
LYMPHOCYTES # BLD AUTO: 1.57 K/UL (ref 1–4.8)
LYMPHOCYTES NFR BLD: 8.1 % (ref 22–41)
MCH RBC QN AUTO: 27.4 PG (ref 27–33)
MCHC RBC AUTO-ENTMCNC: 34 G/DL (ref 33.6–35)
MCV RBC AUTO: 80.6 FL (ref 81.4–97.8)
MONOCYTES # BLD AUTO: 0.94 K/UL (ref 0–0.85)
MONOCYTES NFR BLD AUTO: 4.8 % (ref 0–13.4)
NEUTROPHILS # BLD AUTO: 16.69 K/UL (ref 2–7.15)
NEUTROPHILS NFR BLD: 85.6 % (ref 44–72)
NRBC # BLD AUTO: 0 K/UL
NRBC BLD-RTO: 0 /100 WBC
PLATELET # BLD AUTO: 269 K/UL (ref 164–446)
PMV BLD AUTO: 8.5 FL (ref 9–12.9)
POTASSIUM SERPL-SCNC: 3.6 MMOL/L (ref 3.6–5.5)
PROT SERPL-MCNC: 6 G/DL (ref 6–8.2)
PROTHROMBIN TIME: 16.7 SEC (ref 12–14.6)
RBC # BLD AUTO: 3.87 M/UL (ref 4.2–5.4)
SIGNIFICANT IND 70042: NORMAL
SITE SITE: NORMAL
SODIUM SERPL-SCNC: 125 MMOL/L (ref 135–145)
SOURCE SOURCE: NORMAL
WBC # BLD AUTO: 19.5 K/UL (ref 4.8–10.8)

## 2022-08-24 PROCEDURE — 700102 HCHG RX REV CODE 250 W/ 637 OVERRIDE(OP): Performed by: INTERNAL MEDICINE

## 2022-08-24 PROCEDURE — 770001 HCHG ROOM/CARE - MED/SURG/GYN PRIV*

## 2022-08-24 PROCEDURE — 99233 SBSQ HOSP IP/OBS HIGH 50: CPT | Performed by: INTERNAL MEDICINE

## 2022-08-24 PROCEDURE — 700102 HCHG RX REV CODE 250 W/ 637 OVERRIDE(OP): Performed by: HOSPITALIST

## 2022-08-24 PROCEDURE — 0W9G30Z DRAINAGE OF PERITONEAL CAVITY WITH DRAINAGE DEVICE, PERCUTANEOUS APPROACH: ICD-10-PCS | Performed by: RADIOLOGY

## 2022-08-24 PROCEDURE — 85610 PROTHROMBIN TIME: CPT

## 2022-08-24 PROCEDURE — 700105 HCHG RX REV CODE 258: Performed by: INTERNAL MEDICINE

## 2022-08-24 PROCEDURE — 36415 COLL VENOUS BLD VENIPUNCTURE: CPT

## 2022-08-24 PROCEDURE — 700111 HCHG RX REV CODE 636 W/ 250 OVERRIDE (IP): Performed by: INTERNAL MEDICINE

## 2022-08-24 PROCEDURE — 85025 COMPLETE CBC W/AUTO DIFF WBC: CPT

## 2022-08-24 PROCEDURE — 80053 COMPREHEN METABOLIC PANEL: CPT

## 2022-08-24 PROCEDURE — A9270 NON-COVERED ITEM OR SERVICE: HCPCS | Performed by: HOSPITALIST

## 2022-08-24 PROCEDURE — A9270 NON-COVERED ITEM OR SERVICE: HCPCS | Performed by: INTERNAL MEDICINE

## 2022-08-24 PROCEDURE — 99231 SBSQ HOSP IP/OBS SF/LOW 25: CPT | Mod: 24 | Performed by: SURGERY

## 2022-08-24 RX ADMIN — OXYCODONE HYDROCHLORIDE 10 MG: 10 TABLET ORAL at 19:15

## 2022-08-24 RX ADMIN — PIPERACILLIN SODIUM AND TAZOBACTAM SODIUM 3.38 G: 3; .375 INJECTION, POWDER, FOR SOLUTION INTRAVENOUS at 04:46

## 2022-08-24 RX ADMIN — OXYCODONE HYDROCHLORIDE 10 MG: 10 TABLET ORAL at 07:46

## 2022-08-24 RX ADMIN — OXYCODONE HYDROCHLORIDE 10 MG: 10 TABLET ORAL at 22:16

## 2022-08-24 RX ADMIN — DOCUSATE SODIUM 50 MG AND SENNOSIDES 8.6 MG 2 TABLET: 8.6; 5 TABLET, FILM COATED ORAL at 17:53

## 2022-08-24 RX ADMIN — PIPERACILLIN SODIUM AND TAZOBACTAM SODIUM 3.38 G: 3; .375 INJECTION, POWDER, FOR SOLUTION INTRAVENOUS at 12:57

## 2022-08-24 RX ADMIN — OXYCODONE HYDROCHLORIDE 10 MG: 10 TABLET ORAL at 16:02

## 2022-08-24 RX ADMIN — OXYCODONE HYDROCHLORIDE 10 MG: 10 TABLET ORAL at 04:02

## 2022-08-24 RX ADMIN — DOCUSATE SODIUM 50 MG AND SENNOSIDES 8.6 MG 2 TABLET: 8.6; 5 TABLET, FILM COATED ORAL at 04:46

## 2022-08-24 RX ADMIN — PIPERACILLIN SODIUM AND TAZOBACTAM SODIUM 3.38 G: 3; .375 INJECTION, POWDER, FOR SOLUTION INTRAVENOUS at 20:14

## 2022-08-24 RX ADMIN — POLYETHYLENE GLYCOL 3350 1 PACKET: 17 POWDER, FOR SOLUTION ORAL at 01:31

## 2022-08-24 RX ADMIN — OXYCODONE 5 MG: 5 TABLET ORAL at 11:07

## 2022-08-24 RX ADMIN — ONDANSETRON 4 MG: 2 INJECTION INTRAMUSCULAR; INTRAVENOUS at 15:53

## 2022-08-24 ASSESSMENT — LIFESTYLE VARIABLES
HAVE YOU EVER FELT YOU SHOULD CUT DOWN ON YOUR DRINKING: NO
EVER FELT BAD OR GUILTY ABOUT YOUR DRINKING: NO
TOTAL SCORE: 0
CONSUMPTION TOTAL: NEGATIVE
ON A TYPICAL DAY WHEN YOU DRINK ALCOHOL HOW MANY DRINKS DO YOU HAVE: 0
HAVE PEOPLE ANNOYED YOU BY CRITICIZING YOUR DRINKING: NO
ALCOHOL_USE: NO
HOW MANY TIMES IN THE PAST YEAR HAVE YOU HAD 5 OR MORE DRINKS IN A DAY: 0
EVER HAD A DRINK FIRST THING IN THE MORNING TO STEADY YOUR NERVES TO GET RID OF A HANGOVER: NO
TOTAL SCORE: 0
TOTAL SCORE: 0
AVERAGE NUMBER OF DAYS PER WEEK YOU HAVE A DRINK CONTAINING ALCOHOL: 0
DOES PATIENT WANT TO STOP DRINKING: NO

## 2022-08-24 ASSESSMENT — ENCOUNTER SYMPTOMS
WHEEZING: 0
HEADACHES: 0
ABDOMINAL PAIN: 1
FEVER: 1
SHORTNESS OF BREATH: 0
NAUSEA: 0
EYE PAIN: 0
VOMITING: 0
INSOMNIA: 0
FALLS: 0
HEMOPTYSIS: 0
SEIZURES: 0
LOSS OF CONSCIOUSNESS: 0
PALPITATIONS: 0
EYE REDNESS: 0
CONSTIPATION: 0
CHILLS: 1
DIARRHEA: 0
DIZZINESS: 0
FOCAL WEAKNESS: 0
MYALGIAS: 1
TREMORS: 0
COUGH: 0
BLOOD IN STOOL: 0
WEAKNESS: 0
NERVOUS/ANXIOUS: 0

## 2022-08-24 ASSESSMENT — COGNITIVE AND FUNCTIONAL STATUS - GENERAL
DRESSING REGULAR LOWER BODY CLOTHING: A LITTLE
MOBILITY SCORE: 24
DAILY ACTIVITIY SCORE: 23
SUGGESTED CMS G CODE MODIFIER DAILY ACTIVITY: CI
SUGGESTED CMS G CODE MODIFIER MOBILITY: CH

## 2022-08-24 ASSESSMENT — PAIN DESCRIPTION - PAIN TYPE
TYPE: ACUTE PAIN

## 2022-08-24 ASSESSMENT — PATIENT HEALTH QUESTIONNAIRE - PHQ9
1. LITTLE INTEREST OR PLEASURE IN DOING THINGS: NOT AT ALL
SUM OF ALL RESPONSES TO PHQ9 QUESTIONS 1 AND 2: 0
2. FEELING DOWN, DEPRESSED, IRRITABLE, OR HOPELESS: NOT AT ALL

## 2022-08-24 NOTE — CARE PLAN
The patient is Stable - Low risk of patient condition declining or worsening    Shift Goals  Clinical Goals: pain control  Patient Goals: comfort and pain control  Family Goals: N/A     Progress made toward(s) clinical / shift goals:  Pt verbalizes pain at 7/10 and is medicated per MAR orders. Upon reassessment, pain at a tolerable level 4/10 and is able to rest comfortably.      Problem: Pain - Standard  Goal: Alleviation of pain or a reduction in pain to the patient’s comfort goal  Outcome: Progressing     Problem: Fluid Volume  Goal: Fluid volume balance will be maintained  Outcome: Progressing       Patient is not progressing towards the following goals:

## 2022-08-24 NOTE — CARE PLAN
The patient is Stable - Low risk of patient condition declining or worsening    Shift Goals  Clinical Goals: pain management. IV abx.  Patient Goals: rest, pain control  Family Goals: N/A    Progress made toward(s) clinical / shift goals: Medicating patient per PRN MAR pt's comfort goal 4/10. Archiving post reassessment.    Pt getting IV abx, VS WDL. Plan for IR placement tomorrow 8/25.       Problem: Pain - Standard  Goal: Alleviation of pain or a reduction in pain to the patient’s comfort goal  Outcome: Progressing     Problem: Knowledge Deficit - Standard  Goal: Patient and family/care givers will demonstrate understanding of plan of care, disease process/condition, diagnostic tests and medications  Outcome: Progressing     Problem: Hemodynamics  Goal: Patient's hemodynamics, fluid balance and neurologic status will be stable or improve  Outcome: Progressing        Patient is not progressing towards the following goals:

## 2022-08-24 NOTE — PROGRESS NOTES
Re'd report from day shift RN. Pt lying in bed with no complaints of pain at this time. Pt assessment completed and is AA&Ox4. Bed locked, bed in lowest position, call light and personal belongings within reach. Pt expresses no further needs at this time

## 2022-08-24 NOTE — PROGRESS NOTES
Assumed care of pt at 0700. Report received and bedside rounding completed with night RN. Pt is calm no SOB, no acute distress noted.  Call light and pt belongings within reach - hourly rounding in place. See flowsheets for further assessment.        Mucosal Advancement Flap Text: Given the location of the defect, shape of the defect and the proximity to free margins a mucosal advancement flap was deemed most appropriate. Incisions were made with a 15 blade scalpel in the appropriate fashion along the cutaneous vermilion border and the mucosal lip. The remaining actinically damaged mucosal tissue was excised.  The mucosal advancement flap was then elevated to the gingival sulcus with care taken to preserve the neurovascular structures and advanced into the primary defect. Care was taken to ensure that precise realignment of the vermilion border was achieved.

## 2022-08-24 NOTE — PROGRESS NOTES
[facility] Medicine Daily Progress Note    Date of Service  8/24/2022    Chief Complaint  Brunilda Webber is a 32 y.o. female admitted 8/22/2022 with Abdominal Pain (Pt states she's been having abdominal pain since 8/17/2022. Pain has been progressively getting worse since then.), Vaginal Bleeding (Bleeding since Saturday, unsure if this is her menstrual cycle or not.  ), Fever (Pt states she had a fever of 100.8 at home for which she took ibuprofen. ), and N/V      Hospital Course  No notes on file  32 y.o. female who presented 8/22/2022 with abdominal pain and fever. Ms. Webber has previously known medical conditions that had an undergone in vitro fertilization and developed abdominal pain July 27 was found to have both a tubal and intrauterine pregnancy and underwent salpingectomy.  Intraoperatively she was found to have acute appendicitis therefore she also underwent appendectomy during that surgery.  Subsequently the intrauterine pregnancy was found to not be viable therefore underwent dilation and curettage on 8/6/2022.  On around 8/16/2022 she developed body aches, chills, fatigue with worsening abdominal pain.  Presented to urgent care where a COVID swab was negative and she was referred to the emergency room she also reported vaginal bleeding.  She underwent CAT scan of the abdomen pelvis in the emergency room revealing small to moderate ascites as well as hepatosplenomegaly.  She had a normal white count though elevated lactic acid at 3.3.  She did have a positive urinalysis though no urinary symptoms.  Given her hypotension that was refractory to IV fluids, she was admitted to the intensive care unit with a working diagnosis of septic shock.  Gynecology and surgery were consulted.     8/23/2022: Patient seen and evaluated in the ICU.  He continues to have significant abdominal pain now is more left-sided in nature whereas yesterday it was diffuse.  She is being scheduled for IR for drainage of the ascitic  "fluid.  She remains on IV Zosyn.  Cultures remain pending.  IV fluids have been restarted as she is NPO.\"  Dr. Austin    Interval Problem Update  8/24. Discussed with prior attending. No enough fluid to do a diagnostic tap. Blood culture so far negative. Discussed with Dr. Maldonado and if can;t tap will keep IV Abx for 5 days then reimage  UPDATE: After further review, IR was contacted and will try to get diagnostic aspiration of pelvic abscess tomorrow.    I have discussed this patient's plan of care and discharge plan at IDT rounds today with Case Management, Nursing, Nursing leadership, and other members of the IDT team.    Consultants/Specialty      Code Status  Full Code    Disposition  Patient is not medically cleared for discharge.   Anticipate discharge to TBD  I have placed the appropriate orders for post-discharge needs.    Review of Systems  Review of Systems   Constitutional:  Positive for chills and fever.   HENT:  Negative for congestion, hearing loss and nosebleeds.    Eyes:  Negative for pain and redness.   Respiratory:  Negative for cough, hemoptysis, shortness of breath and wheezing.    Cardiovascular:  Negative for chest pain and palpitations.   Gastrointestinal:  Positive for abdominal pain. Negative for blood in stool, constipation, diarrhea, nausea and vomiting.   Genitourinary:  Negative for dysuria, frequency and hematuria.   Musculoskeletal:  Positive for myalgias. Negative for falls and joint pain.   Skin:  Negative for rash.   Neurological:  Negative for dizziness, tremors, focal weakness, seizures, loss of consciousness, weakness and headaches.   Psychiatric/Behavioral:  The patient is not nervous/anxious and does not have insomnia.    All other systems reviewed and are negative.     Physical Exam  Temp:  [37.1 °C (98.7 °F)-38.2 °C (100.7 °F)] 37.4 °C (99.4 °F)  Pulse:  [] 117  Resp:  [16-22] 16  BP: ()/(56-67) 110/59  SpO2:  [92 %-97 %] 97 %    Physical Exam  Vitals and " nursing note reviewed.   Constitutional:       General: She is not in acute distress.     Comments: Malaised   HENT:      Head: Normocephalic and atraumatic.      Right Ear: External ear normal.      Left Ear: External ear normal.      Nose: Nose normal.      Mouth/Throat:      Mouth: Mucous membranes are moist.   Eyes:      General: No scleral icterus.     Conjunctiva/sclera: Conjunctivae normal.   Cardiovascular:      Rate and Rhythm: Normal rate and regular rhythm.      Pulses: Normal pulses.      Heart sounds: No murmur heard.    No friction rub. No gallop.   Pulmonary:      Effort: Pulmonary effort is normal. No respiratory distress.      Breath sounds: Normal breath sounds. No stridor. No wheezing, rhonchi or rales.   Chest:      Chest wall: No tenderness.   Abdominal:      General: Abdomen is flat. Bowel sounds are normal. There is no distension.      Palpations: Abdomen is soft.      Tenderness: There is abdominal tenderness. There is no guarding or rebound.   Musculoskeletal:         General: No swelling, tenderness or deformity. Normal range of motion.      Cervical back: Normal range of motion and neck supple. No rigidity.   Skin:     General: Skin is warm.      Coloration: Skin is not jaundiced.   Neurological:      General: No focal deficit present.      Mental Status: She is alert and oriented to person, place, and time. Mental status is at baseline.      Motor: Weakness present.   Psychiatric:         Mood and Affect: Mood normal.         Behavior: Behavior normal.         Thought Content: Thought content normal.         Judgment: Judgment normal.      Comments: Anxious       Fluids    Intake/Output Summary (Last 24 hours) at 8/24/2022 0946  Last data filed at 8/24/2022 0600  Gross per 24 hour   Intake 1272.92 ml   Output --   Net 1272.92 ml       Laboratory  Recent Labs     08/22/22  1059 08/22/22  1600 08/23/22  0601   WBC 6.4 5.7 14.3*   RBC 4.68 4.01* 4.07*   HEMOGLOBIN 12.9 11.1* 11.3*    HEMATOCRIT 38.0 32.4* 33.1*   MCV 81.2* 80.8* 81.3*   MCH 27.6 27.7 27.8   MCHC 33.9 34.3 34.1   RDW 42.9 42.4 43.7   PLATELETCT 327 253 270   MPV 8.8* 8.8* 8.5*     Recent Labs     08/22/22  1059 08/23/22  0601   SODIUM 133* 132*   POTASSIUM 3.8 4.2   CHLORIDE 97 96   CO2 21 24   GLUCOSE 129* 137*   BUN 9 6*   CREATININE 0.73 0.73   CALCIUM 9.1 8.2*     Recent Labs     08/22/22  1945   INR 1.40*               Imaging  US-ABDOMEN LTD (SOFT TISSUE)   Final Result      Mild four-quadrant free fluid.      Loculated rim-enhancing fluid collection is suggested within the pelvis/cul-de-sac on the CT, possible abscess.      US-PELVIC TRANSABDOMINAL ONLY   Final Result      1.  Limited transabdominal study due to pain as well as patient refusing endovaginal exam.   2.  Note definite uterine abnormality seen.   3.  Ovaries are not visualized, obscured by bowel gas.      CT-ABDOMEN-PELVIS WITH   Final Result      1.  Hepatosplenomegaly.      2.  Small to moderate ascites throughout the abdomen and pelvis.      3.  Bilateral ovarian cysts, right greater than left.      DX-CHEST-PORTABLE (1 VIEW)   Final Result      No acute cardiac or pulmonary abnormalities are identified.      CT-DRAIN-PERITONEAL    (Results Pending)        Assessment/Plan  * Septic shock, ascites, possible pelvic abscess (HCC)- (present on admission)  Assessment & Plan  This is Septic shock Present on admission  SIRS criteria identified on my evaluation include: Fever, with temperature greater than 101 deg F  Indicators of septic shock include: Severe sepsis present and persistent hypotension after 30 ml/kg completed.   Sources is: abdominal source  Sepsis protocol initiated  Crystalloid Fluid Administration: Fluid resuscitation ordered per standard protocol - 30 mL/kg per current or ideal body weight  IV antibiotics as appropriate for source of sepsis IV Zosyn  Reassessment: I have reassessed the patient's hemodynamic status  IR consulted for aspiration  of fluid for culture/cells. Hold Lovenox. Restart IV fluids.       Hyponatremia- (present on admission)  Assessment & Plan  hypovolemic    Hepatosplenomegaly- (present on admission)  Assessment & Plan  Noted on CT scan    Generalized abdominal pain- (present on admission)  Assessment & Plan  Initially diffuse now left lower quadrant.  Increase the frequency of dilaudid to hourly while NPO       VTE prophylaxis: SCDs/TEDs    I have performed a physical exam and reviewed and updated ROS and Plan today (8/24/2022). In review of yesterday's note (8/23/2022), there are no changes except as documented above.

## 2022-08-24 NOTE — CARE PLAN
The patient is Watcher - Medium risk of patient condition declining or worsening    Shift Goals  Clinical Goals: pain control  bowel movement  Patient Goals: pain control bowel movement  Family Goals: n/a    Progress made toward(s) clinical / shift goals:  patient working hard towards daily goals    Patient is not progressing towards the following goals:

## 2022-08-24 NOTE — PROGRESS NOTES
"  DATE: 8/24/2022    Hospital Day 3  infection of unknown origin, post operative a month from appendectomy .    INTERVAL EVENTS:  IR unable to tap collection but further ultrasound shows potential abscess in deep pelvis.  Ongoing bloating and discomfort.    Recommend 5-7 days of abx and reimaging for further information.       PHYSICAL EXAMINATION:  Vital Signs: /59   Pulse (!) 117   Temp 37.4 °C (99.4 °F) (Temporal)   Resp 16   Ht 1.702 m (5' 7\")   Wt 83 kg (183 lb)   SpO2 97%     Awake and alert  Breathing comfortably  Abdomen distended today, painful on the left    LABORATORY VALUES:   Recent Labs     08/22/22  1059 08/22/22  1600 08/23/22  0601   WBC 6.4 5.7 14.3*   RBC 4.68 4.01* 4.07*   HEMOGLOBIN 12.9 11.1* 11.3*   HEMATOCRIT 38.0 32.4* 33.1*   MCV 81.2* 80.8* 81.3*   MCH 27.6 27.7 27.8   MCHC 33.9 34.3 34.1   RDW 42.9 42.4 43.7   PLATELETCT 327 253 270   MPV 8.8* 8.8* 8.5*     Recent Labs     08/22/22  1059 08/23/22  0601   SODIUM 133* 132*   POTASSIUM 3.8 4.2   CHLORIDE 97 96   CO2 21 24   GLUCOSE 129* 137*   BUN 9 6*   CREATININE 0.73 0.73   CALCIUM 9.1 8.2*     Recent Labs     08/22/22  1059 08/22/22  1945 08/23/22  0601   ASTSGOT 9*  --  11*   ALTSGPT 8  --  6   TBILIRUBIN 0.5  --  0.6   ALKPHOSPHAT 74  --  49   GLOBULIN 4.0*  --  3.2   INR  --  1.40*  --      Recent Labs     08/22/22 1945   INR 1.40*        IMAGING:   US-ABDOMEN LTD (SOFT TISSUE)   Final Result      Mild four-quadrant free fluid.      Loculated rim-enhancing fluid collection is suggested within the pelvis/cul-de-sac on the CT, possible abscess.      US-PELVIC TRANSABDOMINAL ONLY   Final Result      1.  Limited transabdominal study due to pain as well as patient refusing endovaginal exam.   2.  Note definite uterine abnormality seen.   3.  Ovaries are not visualized, obscured by bowel gas.      CT-ABDOMEN-PELVIS WITH   Final Result      1.  Hepatosplenomegaly.      2.  Small to moderate ascites throughout the abdomen and " pelvis.      3.  Bilateral ovarian cysts, right greater than left.      DX-CHEST-PORTABLE (1 VIEW)   Final Result      No acute cardiac or pulmonary abnormalities are identified.          ASSESSMENT AND PLAN:   Generalized abdominal pain- (present on admission)  Assessment & Plan  Post operative four weeks.    Cont IV abx.    Possible abscess or infected hematoma, but fairly remote.  Too small for IR drainage.    Recommend ongoing abx and re-image at 5-7 days to see if evolves into a collection amenable to drainage.           ____________________________________     Dona Maldonado M.D.    DD: 8/23/2022  12:33 PM

## 2022-08-24 NOTE — PROGRESS NOTES
HD#3: GYN   Brunilda states she is feeling better today and slept last night. Pain controlled with oral meds only. Denies pain at present.  Still feels bloated. No f/c/n/v/d. Tolerating po liquids well and some food. Voiding well. Walking in room without problems.   VSS/AF  Abd: distended, bs+, nontender today  Ext: min edema bilaterally, NTTP  CBC: WBC elevated  Vaginal cx: E.coli - normal leah per lab    A: Abdominal infection - appears to be stemming from possible clot/abscess in cds - s/p appy 1month ago       No fever now/n/v/d. Pain improved. WBC still high.  P: Recommend cont IV abx until WBC normalized and then transition to oral abx for outpatient follow up as long as still feeling well and no fever.  Watch for any changes in clinical status.  Appreciate Dr. Maldonado/team taking primary surgical role. Will continue to follow progress.  /rossy

## 2022-08-24 NOTE — PROGRESS NOTES
4 Eyes Skin Assessment Completed by Mini Beach, RN and Kamryn Abraham, GERALDO.    Head WDL  Ears WDL  Nose WDL  Mouth WDL  Neck WDL  Breast/Chest WDL  Shoulder Blades WDL  Spine WDL  (R) Arm/Elbow/Hand WDL  (L) Arm/Elbow/Hand WDL  Abdomen WDL  Groin WDL  Scrotum/Coccyx/Buttocks WDL  (R) Leg WDL  (L) Leg WDL  (R) Heel/Foot/Toe WDL  (L) Heel/Foot/Toe WDL          Devices In Places PIV left AC      Interventions In Place N/A    Possible Skin Injury No    Pictures Uploaded Into Epic N/A  Wound Consult Placed N/A  RN Wound Prevention Protocol Ordered No

## 2022-08-24 NOTE — PROGRESS NOTES
Spoke to Dr Austin and patient is not to be NPO at midnight.  He has stated that patient will not be having an attempt to remove fluid from abdomen.

## 2022-08-25 ENCOUNTER — APPOINTMENT (OUTPATIENT)
Dept: RADIOLOGY | Facility: MEDICAL CENTER | Age: 32
DRG: 871 | End: 2022-08-25
Attending: OBSTETRICS & GYNECOLOGY
Payer: COMMERCIAL

## 2022-08-25 ENCOUNTER — APPOINTMENT (OUTPATIENT)
Dept: RADIOLOGY | Facility: MEDICAL CENTER | Age: 32
DRG: 871 | End: 2022-08-25
Attending: SURGERY
Payer: COMMERCIAL

## 2022-08-25 ENCOUNTER — APPOINTMENT (OUTPATIENT)
Dept: RADIOLOGY | Facility: MEDICAL CENTER | Age: 32
DRG: 871 | End: 2022-08-25
Payer: COMMERCIAL

## 2022-08-25 LAB
ALBUMIN SERPL BCP-MCNC: 2.6 G/DL (ref 3.2–4.9)
ALBUMIN/GLOB SERPL: 0.9 G/DL
ALP SERPL-CCNC: 69 U/L (ref 30–99)
ALT SERPL-CCNC: 8 U/L (ref 2–50)
ANION GAP SERPL CALC-SCNC: 10 MMOL/L (ref 7–16)
AST SERPL-CCNC: 12 U/L (ref 12–45)
BACTERIA WND AEROBE CULT: ABNORMAL
BACTERIA WND AEROBE CULT: ABNORMAL
BILIRUB SERPL-MCNC: 0.4 MG/DL (ref 0.1–1.5)
BUN SERPL-MCNC: 4 MG/DL (ref 8–22)
CALCIUM SERPL-MCNC: 7.9 MG/DL (ref 8.5–10.5)
CHLORIDE SERPL-SCNC: 92 MMOL/L (ref 96–112)
CO2 SERPL-SCNC: 27 MMOL/L (ref 20–33)
CREAT SERPL-MCNC: 0.6 MG/DL (ref 0.5–1.4)
D DIMER PPP IA.FEU-MCNC: 14.59 UG/ML (FEU) (ref 0–0.5)
EKG IMPRESSION: NORMAL
ERYTHROCYTE [DISTWIDTH] IN BLOOD BY AUTOMATED COUNT: 43.6 FL (ref 35.9–50)
FLUAV RNA SPEC QL NAA+PROBE: NEGATIVE
FLUBV RNA SPEC QL NAA+PROBE: NEGATIVE
GFR SERPLBLD CREATININE-BSD FMLA CKD-EPI: 123 ML/MIN/1.73 M 2
GLOBULIN SER CALC-MCNC: 3 G/DL (ref 1.9–3.5)
GLUCOSE SERPL-MCNC: 102 MG/DL (ref 65–99)
GRAM STN SPEC: ABNORMAL
HCT VFR BLD AUTO: 30.7 % (ref 37–47)
HGB BLD-MCNC: 10.5 G/DL (ref 12–16)
LACTATE SERPL-SCNC: 0.8 MMOL/L (ref 0.5–2)
MCH RBC QN AUTO: 27.3 PG (ref 27–33)
MCHC RBC AUTO-ENTMCNC: 34.2 G/DL (ref 33.6–35)
MCV RBC AUTO: 79.9 FL (ref 81.4–97.8)
NT-PROBNP SERPL IA-MCNC: 326 PG/ML (ref 0–125)
PLATELET # BLD AUTO: 296 K/UL (ref 164–446)
PMV BLD AUTO: 8.5 FL (ref 9–12.9)
POTASSIUM SERPL-SCNC: 3.8 MMOL/L (ref 3.6–5.5)
PROT SERPL-MCNC: 5.6 G/DL (ref 6–8.2)
RBC # BLD AUTO: 3.84 M/UL (ref 4.2–5.4)
RSV RNA SPEC QL NAA+PROBE: NEGATIVE
SARS-COV-2 RNA RESP QL NAA+PROBE: NOTDETECTED
SIGNIFICANT IND 70042: ABNORMAL
SITE SITE: ABNORMAL
SODIUM SERPL-SCNC: 129 MMOL/L (ref 135–145)
SOURCE SOURCE: ABNORMAL
SPECIMEN SOURCE: NORMAL
WBC # BLD AUTO: 18.7 K/UL (ref 4.8–10.8)

## 2022-08-25 PROCEDURE — 700105 HCHG RX REV CODE 258: Performed by: INTERNAL MEDICINE

## 2022-08-25 PROCEDURE — 85379 FIBRIN DEGRADATION QUANT: CPT

## 2022-08-25 PROCEDURE — 83880 ASSAY OF NATRIURETIC PEPTIDE: CPT

## 2022-08-25 PROCEDURE — A9270 NON-COVERED ITEM OR SERVICE: HCPCS | Performed by: HOSPITALIST

## 2022-08-25 PROCEDURE — 93010 ELECTROCARDIOGRAM REPORT: CPT | Performed by: INTERNAL MEDICINE

## 2022-08-25 PROCEDURE — 93971 EXTREMITY STUDY: CPT | Mod: LT

## 2022-08-25 PROCEDURE — 93005 ELECTROCARDIOGRAM TRACING: CPT

## 2022-08-25 PROCEDURE — 700102 HCHG RX REV CODE 250 W/ 637 OVERRIDE(OP): Performed by: INTERNAL MEDICINE

## 2022-08-25 PROCEDURE — 85027 COMPLETE CBC AUTOMATED: CPT

## 2022-08-25 PROCEDURE — 36415 COLL VENOUS BLD VENIPUNCTURE: CPT

## 2022-08-25 PROCEDURE — A9270 NON-COVERED ITEM OR SERVICE: HCPCS | Performed by: INTERNAL MEDICINE

## 2022-08-25 PROCEDURE — 0241U HCHG SARS-COV-2 COVID-19 NFCT DS RESP RNA 4 TRGT MIC: CPT

## 2022-08-25 PROCEDURE — A9270 NON-COVERED ITEM OR SERVICE: HCPCS

## 2022-08-25 PROCEDURE — 99233 SBSQ HOSP IP/OBS HIGH 50: CPT | Performed by: INTERNAL MEDICINE

## 2022-08-25 PROCEDURE — 99024 POSTOP FOLLOW-UP VISIT: CPT | Performed by: PHYSICIAN ASSISTANT

## 2022-08-25 PROCEDURE — 700102 HCHG RX REV CODE 250 W/ 637 OVERRIDE(OP): Performed by: HOSPITALIST

## 2022-08-25 PROCEDURE — 700111 HCHG RX REV CODE 636 W/ 250 OVERRIDE (IP): Performed by: INTERNAL MEDICINE

## 2022-08-25 PROCEDURE — 700111 HCHG RX REV CODE 636 W/ 250 OVERRIDE (IP): Performed by: HOSPITALIST

## 2022-08-25 PROCEDURE — 71045 X-RAY EXAM CHEST 1 VIEW: CPT

## 2022-08-25 PROCEDURE — C9803 HOPD COVID-19 SPEC COLLECT: HCPCS

## 2022-08-25 PROCEDURE — 80053 COMPREHEN METABOLIC PANEL: CPT

## 2022-08-25 PROCEDURE — 83605 ASSAY OF LACTIC ACID: CPT

## 2022-08-25 PROCEDURE — 770001 HCHG ROOM/CARE - MED/SURG/GYN PRIV*

## 2022-08-25 PROCEDURE — 700105 HCHG RX REV CODE 258: Performed by: HOSPITALIST

## 2022-08-25 PROCEDURE — 700102 HCHG RX REV CODE 250 W/ 637 OVERRIDE(OP)

## 2022-08-25 RX ORDER — SODIUM CHLORIDE 9 MG/ML
500 INJECTION, SOLUTION INTRAVENOUS
Status: ACTIVE | OUTPATIENT
Start: 2022-08-25 | End: 2022-08-25

## 2022-08-25 RX ORDER — MIDAZOLAM HYDROCHLORIDE 1 MG/ML
INJECTION INTRAMUSCULAR; INTRAVENOUS
Status: DISPENSED
Start: 2022-08-25 | End: 2022-08-25

## 2022-08-25 RX ORDER — MIDAZOLAM HYDROCHLORIDE 1 MG/ML
.5-2 INJECTION INTRAMUSCULAR; INTRAVENOUS PRN
Status: ACTIVE | OUTPATIENT
Start: 2022-08-25 | End: 2022-08-25

## 2022-08-25 RX ORDER — DOXYCYCLINE 100 MG/1
100 TABLET ORAL EVERY 12 HOURS
Status: DISPENSED | OUTPATIENT
Start: 2022-08-25 | End: 2022-08-28

## 2022-08-25 RX ORDER — ONDANSETRON 2 MG/ML
4 INJECTION INTRAMUSCULAR; INTRAVENOUS EVERY 6 HOURS PRN
Status: ACTIVE | OUTPATIENT
Start: 2022-08-25 | End: 2022-08-25

## 2022-08-25 RX ADMIN — PIPERACILLIN SODIUM AND TAZOBACTAM SODIUM 3.38 G: 3; .375 INJECTION, POWDER, FOR SOLUTION INTRAVENOUS at 04:04

## 2022-08-25 RX ADMIN — PIPERACILLIN SODIUM AND TAZOBACTAM SODIUM 3.38 G: 3; .375 INJECTION, POWDER, FOR SOLUTION INTRAVENOUS at 19:38

## 2022-08-25 RX ADMIN — OXYCODONE HYDROCHLORIDE 10 MG: 10 TABLET ORAL at 04:03

## 2022-08-25 RX ADMIN — SODIUM CHLORIDE, POTASSIUM CHLORIDE, SODIUM LACTATE AND CALCIUM CHLORIDE: 600; 310; 30; 20 INJECTION, SOLUTION INTRAVENOUS at 01:11

## 2022-08-25 RX ADMIN — HYDROMORPHONE HYDROCHLORIDE 0.5 MG: 1 INJECTION, SOLUTION INTRAMUSCULAR; INTRAVENOUS; SUBCUTANEOUS at 10:30

## 2022-08-25 RX ADMIN — DOXYCYCLINE 100 MG: 100 TABLET, FILM COATED ORAL at 17:58

## 2022-08-25 RX ADMIN — OXYCODONE HYDROCHLORIDE 10 MG: 10 TABLET ORAL at 17:57

## 2022-08-25 RX ADMIN — OXYCODONE HYDROCHLORIDE 10 MG: 10 TABLET ORAL at 07:58

## 2022-08-25 RX ADMIN — DOXYCYCLINE 100 MG: 100 TABLET, FILM COATED ORAL at 07:58

## 2022-08-25 RX ADMIN — OXYCODONE HYDROCHLORIDE 10 MG: 10 TABLET ORAL at 14:17

## 2022-08-25 RX ADMIN — DOCUSATE SODIUM 50 MG AND SENNOSIDES 8.6 MG 2 TABLET: 8.6; 5 TABLET, FILM COATED ORAL at 04:03

## 2022-08-25 RX ADMIN — OXYCODONE HYDROCHLORIDE 10 MG: 10 TABLET ORAL at 01:10

## 2022-08-25 RX ADMIN — OXYCODONE HYDROCHLORIDE 10 MG: 10 TABLET ORAL at 21:16

## 2022-08-25 RX ADMIN — PIPERACILLIN SODIUM AND TAZOBACTAM SODIUM 3.38 G: 3; .375 INJECTION, POWDER, FOR SOLUTION INTRAVENOUS at 14:06

## 2022-08-25 RX ADMIN — DOCUSATE SODIUM 50 MG AND SENNOSIDES 8.6 MG 2 TABLET: 8.6; 5 TABLET, FILM COATED ORAL at 17:58

## 2022-08-25 ASSESSMENT — PAIN DESCRIPTION - PAIN TYPE
TYPE: ACUTE PAIN

## 2022-08-25 ASSESSMENT — ENCOUNTER SYMPTOMS
FOCAL WEAKNESS: 0
HEADACHES: 0
FEVER: 0
DIARRHEA: 0
CONSTIPATION: 0
NERVOUS/ANXIOUS: 0
LOSS OF CONSCIOUSNESS: 0
CHILLS: 0
ABDOMINAL PAIN: 1
VOMITING: 0
SEIZURES: 0
NAUSEA: 0
EYE PAIN: 0
COUGH: 0
INSOMNIA: 0
SHORTNESS OF BREATH: 0
HEMOPTYSIS: 0
WEAKNESS: 0
MYALGIAS: 1
CHILLS: 1
FALLS: 0
TREMORS: 0
DIZZINESS: 0
PALPITATIONS: 0
EYE REDNESS: 0
WHEEZING: 0
FEVER: 1
BLOOD IN STOOL: 0

## 2022-08-25 ASSESSMENT — PAIN SCALES - PAIN ASSESSMENT IN ADVANCED DEMENTIA (PAINAD)
CONSOLABILITY: NO NEED TO CONSOLE
BODYLANGUAGE: RELAXED
BREATHING: NORMAL
FACIALEXPRESSION: SMILING OR INEXPRESSIVE
TOTALSCORE: 0

## 2022-08-25 ASSESSMENT — PAIN SCALES - WONG BAKER
WONGBAKER_NUMERICALRESPONSE: HURTS A LITTLE MORE
WONGBAKER_NUMERICALRESPONSE: HURTS A LITTLE MORE

## 2022-08-25 NOTE — PROGRESS NOTES
HD#4:  Missed Brunilda in her room so spoke with her over the phone. Had been doing ok until needed to be prone for CT when she had a lot of pain preventing any further progress with the procedure. When lying in bed pain level is 3-4/10. No n/v/d/f/c/CP/cough. Notes some left leg edema. Had hypoxia episode early this am. CXR negative, EKG wnl, responded to O2.  VS: STach  WBC plateaued  No exam done due to phone call  A: Abd pain related to possible pelvic abscess with recent infected appy one month ago. WBC plateauing, oral pain control, AF, but persistent tachycardia. LLE edema per pt report.  P: Cont abx, pain meds, watch for WBC improvement. Tachycardia and O2 per hospitalist team, surgical options per Dr. Maldonado. Will order ultrasound of LLE to r/o DVT.  /niravw

## 2022-08-25 NOTE — OR SURGEON
Immediate Post- Operative Note        Findings: pt would not tolerate prone positioning for procedure; will need to r/s with anesthesia            8/25/2022     9:30 AM     Reyes Allen M.D.

## 2022-08-25 NOTE — PROGRESS NOTES
Pt is scheduled for Ct Guided Pelvic Drain Abscess on Friday 8/26 at 3:30pm with General Anesthesia.  Pt to be NPO 6 hrs prior to exam, no blood thinners to be given.

## 2022-08-25 NOTE — PROGRESS NOTES
Received report and assumed care of patient at change of shift. Patient is A&Ox4, on 3L O2, and reports 5/10 pain at this time. Patient assessment completed, bed in lowest position, and call light and personal belongings are within reach. Patient expressed no further needs at this time.

## 2022-08-25 NOTE — PROGRESS NOTES
"0600 Addendum: chest x-ray revealing a new left lower lobe infiltrate and trace left pleural effusion. Ceftriaxone and doxycycline ordered for HAP.       [facility] Coverage Progress Note:     APRN to bedside for concerns of acute hypoxia and tachycardia this evening. The patient was previously saturating above 95% on room air, however dropped to 84% and is requiring 2L supplemental oxygen. She has had ongoing tachycardia, heart rate 120-130 BPM.   The patient was seen at bedside. She denies chest pain, shortness of breath, palpitations, cough, nausea or vomiting. She does endorse ongoing diffuse abdominal pain and distention/bloating.   EKG was obtained showing sinus rhythm, heart rate 99, no acute ST changes. Daily labs were reviewed and notable for leukocytosis from 19.5 to 18.7, stable hbg at 10.5, and stable renal function. Blood cultures with no growth to date.     /61   Pulse (!) 127   Temp 37.1 °C (98.7 °F) (Temporal)   Resp 18   Ht 1.702 m (5' 7\")   Wt 83 kg (183 lb)   SpO2 92%       Physical Exam  Vitals reviewed.   Constitutional:       General: She is not in acute distress.     Appearance: She is ill-appearing.   HENT:      Mouth/Throat:      Mouth: Mucous membranes are moist.   Eyes:      Extraocular Movements: Extraocular movements intact.   Cardiovascular:      Rate and Rhythm: Regular rhythm. Tachycardia present.      Pulses: Normal pulses.      Heart sounds: Normal heart sounds. No murmur heard.  Pulmonary:      Effort: Pulmonary effort is normal. No respiratory distress.      Breath sounds: Decreased air movement present. No wheezing, rhonchi or rales.   Abdominal:      General: A surgical scar is present. There is distension.      Tenderness: There is generalized abdominal tenderness.      Comments: Firm to palpation   Musculoskeletal:      Right lower leg: Edema present.      Left lower leg: Edema present.      Comments: Trace lower extremity edema, nonpitting    Skin:     General: " Skin is warm and dry.      Coloration: Skin is pale.   Neurological:      General: No focal deficit present.      Mental Status: She is alert and oriented to person, place, and time. Mental status is at baseline.       Plan:     Acute hypoxemic respiratory failure  Differentials include but not limited to fluid overload vs pulmonary embolism vs HAP vs viral infection   - patient receiving LR at 125ml/hour while NPO, will hold pending work up   - CXR, BNP, D-dimer, Lactate, COVID/flu viral panel ordered     Work up is pending at this time. Ongoing management per the primary team.     ROSE Oliver   [facility] Hospitalist

## 2022-08-25 NOTE — PROGRESS NOTES
"    DATE: 8/25/2022    Hospital Day 4  admitted for infection of unknown origin, post operative a month from appendectomy.    INTERVAL EVENTS:  Failed attempt at IR drain placement due to positioning.  Patient now tearful and hesitant to go forward with drain placement under anesthesia.  DVT study negative.  WBC essentially stable at 18.7.    REVIEW OF SYSTEMS:  Review of Systems   Constitutional:  Negative for chills and fever.   HENT: Negative.     Respiratory:  Negative for cough and shortness of breath.    Gastrointestinal:  Positive for abdominal pain. Negative for nausea and vomiting.   Genitourinary:         Voiding   Skin: Negative.      PHYSICAL EXAMINATION:  Vital Signs: /66   Pulse (!) 117   Temp 36.7 °C (98 °F) (Temporal)   Resp 16   Ht 1.702 m (5' 7\")   Wt 83 kg (183 lb)   SpO2 92%   Physical Exam  Vitals and nursing note reviewed.   Constitutional:       Appearance: Normal appearance.   Abdominal:      General: There is distension.      Palpations: Abdomen is soft.      Tenderness: There is generalized abdominal tenderness.   Neurological:      Mental Status: She is alert.       LABORATORY VALUES:   Recent Labs     08/23/22  0601 08/24/22 1021 08/25/22  0458   WBC 14.3* 19.5* 18.7*   RBC 4.07* 3.87* 3.84*   HEMOGLOBIN 11.3* 10.6* 10.5*   HEMATOCRIT 33.1* 31.2* 30.7*   MCV 81.3* 80.6* 79.9*   MCH 27.8 27.4 27.3   MCHC 34.1 34.0 34.2   RDW 43.7 43.7 43.6   PLATELETCT 270 269 296   MPV 8.5* 8.5* 8.5*     Recent Labs     08/23/22  0601 08/24/22  1021 08/25/22  0458   SODIUM 132* 125* 129*   POTASSIUM 4.2 3.6 3.8   CHLORIDE 96 91* 92*   CO2 24 24 27   GLUCOSE 137* 110* 102*   BUN 6* 6* 4*   CREATININE 0.73 0.57 0.60   CALCIUM 8.2* 8.1* 7.9*     Recent Labs     08/22/22  1945 08/23/22  0601 08/24/22  1021 08/25/22  0458   ASTSGOT  --  11* 12 12   ALTSGPT  --  6 10 8   TBILIRUBIN  --  0.6 0.4 0.4   ALKPHOSPHAT  --  49 64 69   GLOBULIN  --  3.2 3.3 3.0   INR 1.40*  --  1.38*  --      Recent Labs "     08/22/22  1945 08/24/22  1021   INR 1.40* 1.38*        Labs reviewed, Medications reviewed and Radiology images reviewed  Charles catheter: No Charles        DVT prophylaxis - mechanical: SCDs  Ulcer prophylaxis: Not indicated  Antibiotics: Treating active infection/contamination beyond 24 hours perioperative coverage  Assessed for rehab: Patient returned to prior level of function, rehabilitation not indicated at this time    ASSESSMENT AND PLAN:   Generalized abdominal pain- (present on admission)  Assessment & Plan  Post operative four weeks.  Cont IV abx.  Possible abscess or infected hematoma, but fairly remote. Too small for IR drainage.    Recommend ongoing abx and re-image at 5-7 days to see if evolves into a collection amenable to drainage.      - Recheck labs in am  - Patient agreeable to IR drain placement under anesthesia if necessary tomorrow  - Surgery following    Discussed patient condition with RN, Patient, and general surgery. Dr. Maldonado

## 2022-08-25 NOTE — PROGRESS NOTES
[facility] Medicine Daily Progress Note    Date of Service  8/25/2022    Chief Complaint  Brunilda Webber is a 32 y.o. female admitted 8/22/2022 with Abdominal Pain (Pt states she's been having abdominal pain since 8/17/2022. Pain has been progressively getting worse since then.), Vaginal Bleeding (Bleeding since Saturday, unsure if this is her menstrual cycle or not.  ), Fever (Pt states she had a fever of 100.8 at home for which she took ibuprofen. ), and N/V      Hospital Course  No notes on file  32 y.o. female who presented 8/22/2022 with abdominal pain and fever. Ms. Webber has previously known medical conditions that had an undergone in vitro fertilization and developed abdominal pain July 27 was found to have both a tubal and intrauterine pregnancy and underwent salpingectomy.  Intraoperatively she was found to have acute appendicitis therefore she also underwent appendectomy during that surgery.  Subsequently the intrauterine pregnancy was found to not be viable therefore underwent dilation and curettage on 8/6/2022.  On around 8/16/2022 she developed body aches, chills, fatigue with worsening abdominal pain.  Presented to urgent care where a COVID swab was negative and she was referred to the emergency room she also reported vaginal bleeding.  She underwent CAT scan of the abdomen pelvis in the emergency room revealing small to moderate ascites as well as hepatosplenomegaly.  She had a normal white count though elevated lactic acid at 3.3.  She did have a positive urinalysis though no urinary symptoms.  Given her hypotension that was refractory to IV fluids, she was admitted to the intensive care unit with a working diagnosis of septic shock.  Gynecology and surgery were consulted.     8/23/2022: Patient seen and evaluated in the ICU.  He continues to have significant abdominal pain now is more left-sided in nature whereas yesterday it was diffuse.  She is being scheduled for IR for drainage of the ascitic  "fluid.  She remains on IV Zosyn.  Cultures remain pending.  IV fluids have been restarted as she is NPO.\"  Dr. Austin    Interval Problem Update  8/24. Discussed with prior attending. No enough fluid to do a diagnostic tap. Blood culture so far negative. Discussed with Dr. Maldonado and if can;t tap will keep IV Abx for 5 days then reimage  UPDATE: After further review, IR was contacted and will try to get diagnostic aspiration of pelvic abscess tomorrow.  8/25. IR unable to do the aspiration. Patient could not tolerate lying on stomach.   May need to repeat CT tomorrow instead.      I have discussed this patient's plan of care and discharge plan at IDT rounds today with Case Management, Nursing, Nursing leadership, and other members of the IDT team.    Consultants/Specialty      Code Status  Full Code    Disposition  Patient is not medically cleared for discharge.   Anticipate discharge to TBD  I have placed the appropriate orders for post-discharge needs.    Review of Systems  Review of Systems   Constitutional:  Positive for chills and fever.   HENT:  Negative for congestion, hearing loss and nosebleeds.    Eyes:  Negative for pain and redness.   Respiratory:  Negative for cough, hemoptysis, shortness of breath and wheezing.    Cardiovascular:  Negative for chest pain and palpitations.   Gastrointestinal:  Positive for abdominal pain. Negative for blood in stool, constipation, diarrhea, nausea and vomiting.   Genitourinary:  Negative for dysuria, frequency and hematuria.   Musculoskeletal:  Positive for myalgias. Negative for falls and joint pain.   Skin:  Negative for rash.   Neurological:  Negative for dizziness, tremors, focal weakness, seizures, loss of consciousness, weakness and headaches.   Psychiatric/Behavioral:  The patient is not nervous/anxious and does not have insomnia.    All other systems reviewed and are negative.     Physical Exam  Temp:  [36.8 °C (98.2 °F)-37.6 °C (99.6 °F)] 36.8 °C (98.2 " °F)  Pulse:  [110-142] 142  Resp:  [16-22] 22  BP: (105-122)/(59-76) 122/75  SpO2:  [84 %-96 %] 94 %    Physical Exam  Vitals and nursing note reviewed.   Constitutional:       General: She is not in acute distress.     Comments: Malaised   HENT:      Head: Normocephalic and atraumatic.      Right Ear: External ear normal.      Left Ear: External ear normal.      Nose: Nose normal.      Mouth/Throat:      Mouth: Mucous membranes are moist.   Eyes:      General: No scleral icterus.     Conjunctiva/sclera: Conjunctivae normal.   Cardiovascular:      Rate and Rhythm: Normal rate and regular rhythm.      Pulses: Normal pulses.      Heart sounds: No murmur heard.    No friction rub. No gallop.   Pulmonary:      Effort: Pulmonary effort is normal. No respiratory distress.      Breath sounds: Normal breath sounds. No stridor. No wheezing, rhonchi or rales.   Chest:      Chest wall: No tenderness.   Abdominal:      General: Abdomen is flat. Bowel sounds are normal. There is no distension.      Palpations: Abdomen is soft.      Tenderness: There is abdominal tenderness. There is no guarding or rebound.   Musculoskeletal:         General: No swelling, tenderness or deformity. Normal range of motion.      Cervical back: Normal range of motion and neck supple. No rigidity.   Skin:     General: Skin is warm.      Coloration: Skin is not jaundiced.   Neurological:      General: No focal deficit present.      Mental Status: She is alert and oriented to person, place, and time. Mental status is at baseline.      Motor: Weakness present.   Psychiatric:         Mood and Affect: Mood normal.         Behavior: Behavior normal.         Thought Content: Thought content normal.         Judgment: Judgment normal.      Comments: Anxious       Fluids    Intake/Output Summary (Last 24 hours) at 8/25/2022 0944  Last data filed at 8/25/2022 0014  Gross per 24 hour   Intake 1600 ml   Output --   Net 1600 ml         Laboratory  Recent Labs      08/23/22  0601 08/24/22  1021 08/25/22  0458   WBC 14.3* 19.5* 18.7*   RBC 4.07* 3.87* 3.84*   HEMOGLOBIN 11.3* 10.6* 10.5*   HEMATOCRIT 33.1* 31.2* 30.7*   MCV 81.3* 80.6* 79.9*   MCH 27.8 27.4 27.3   MCHC 34.1 34.0 34.2   RDW 43.7 43.7 43.6   PLATELETCT 270 269 296   MPV 8.5* 8.5* 8.5*       Recent Labs     08/23/22  0601 08/24/22  1021 08/25/22  0458   SODIUM 132* 125* 129*   POTASSIUM 4.2 3.6 3.8   CHLORIDE 96 91* 92*   CO2 24 24 27   GLUCOSE 137* 110* 102*   BUN 6* 6* 4*   CREATININE 0.73 0.57 0.60   CALCIUM 8.2* 8.1* 7.9*       Recent Labs     08/22/22 1945 08/24/22  1021   INR 1.40* 1.38*                 Imaging  DX-CHEST-PORTABLE (1 VIEW)   Final Result         1.  Left lower lobe infiltrate.   2.  Trace left pleural effusion      US-ABDOMEN LTD (SOFT TISSUE)   Final Result      Mild four-quadrant free fluid.      Loculated rim-enhancing fluid collection is suggested within the pelvis/cul-de-sac on the CT, possible abscess.      US-PELVIC TRANSABDOMINAL ONLY   Final Result      1.  Limited transabdominal study due to pain as well as patient refusing endovaginal exam.   2.  Note definite uterine abnormality seen.   3.  Ovaries are not visualized, obscured by bowel gas.      CT-ABDOMEN-PELVIS WITH   Final Result      1.  Hepatosplenomegaly.      2.  Small to moderate ascites throughout the abdomen and pelvis.      3.  Bilateral ovarian cysts, right greater than left.      DX-CHEST-PORTABLE (1 VIEW)   Final Result      No acute cardiac or pulmonary abnormalities are identified.      CT-DRAIN-PERITONEAL    (Results Pending)          Assessment/Plan  * Septic shock, ascites, possible pelvic abscess (HCC)- (present on admission)  Assessment & Plan  This is Septic shock Present on admission  SIRS criteria identified on my evaluation include: Fever, with temperature greater than 101 deg F  Indicators of septic shock include: Severe sepsis present and persistent hypotension after 30 ml/kg completed.   Sources is:  "abdominal source  Sepsis protocol initiated  Crystalloid Fluid Administration: Fluid resuscitation ordered per standard protocol - 30 mL/kg per current or ideal body weight  IV antibiotics as appropriate for source of sepsis IV Zosyn  Reassessment: I have reassessed the patient's hemodynamic status  IR consulted for aspiration of fluid for culture/cells. Hold Lovenox. Restart IV fluids.\"    Discussed with Dr. Maldonado  IR plan aspiration tomorrow.        Hyponatremia- (present on admission)  Assessment & Plan  hypovolemic    Hepatosplenomegaly- (present on admission)  Assessment & Plan  Noted on CT scan    Generalized abdominal pain- (present on admission)  Assessment & Plan  Initially diffuse now left lower quadrant.  Increase the frequency of dilaudid to hourly while NPO       VTE prophylaxis: SCDs/TEDs    I have performed a physical exam and reviewed and updated ROS and Plan today (8/25/2022). In review of yesterday's note (8/24/2022), there are no changes except as documented above.        "

## 2022-08-25 NOTE — PROGRESS NOTES
Assumed care of patient.  Alert and oriented, medicated per MAR.  Tolerating ordered diet well.  All needs addressed at this moment.  Call light within reach, bed locked and in low position, bed alarm on, fall education and precautions in place.

## 2022-08-25 NOTE — CARE PLAN
The patient is Watcher - Medium risk of patient condition declining or worsening    Shift Goals  Clinical Goals: Pain management; abx therapy  Patient Goals: pain control, sleep  Family Goals: na    Progress made toward(s) clinical / shift goals:  Patient's pain well controlled with pharmacological interventions.

## 2022-08-25 NOTE — PROGRESS NOTES
0416 - Hospitalist paged regarding patient's change in vitals.  HR began to sustain in the mid to high 120s and O2 sat was sustaining at 83%-84% on room air.  Patient was then placed on 2 liters NC.  Orders received for chest xray, stat ekg (paged), and labs.  Patient does state that her abdomen feels more distended.

## 2022-08-25 NOTE — PROGRESS NOTES
Pt presents to IR CT. Pt was consented by MD at bedside, confirmed by this RN and consent at bedside at 0833. Pt transferred to CT table in prone position at 0915. Patient unable to tolerate lying flat on CT table, Dr. Allen at bedside. Case cancelled and will reschedule with Anesthesia. Report called to Tito CHOW. Pt transported by marci with RN to [id number].

## 2022-08-25 NOTE — PROGRESS NOTES
Received report from night shift RN and assumed care of patient (pt). Pt is A&O x 4. Pt reports 8/10 pain to her abdomen. Pt medicated per MAR. Patient remains tachycardic, but other vital signs are stable.Pt is also tearful. Bed is in lowest, locked position, call light and belongings are within reach. Pt calls for assistance and bed alarm is off as patient ambulates independently.  All other needs met.

## 2022-08-26 ENCOUNTER — APPOINTMENT (OUTPATIENT)
Dept: RADIOLOGY | Facility: MEDICAL CENTER | Age: 32
DRG: 871 | End: 2022-08-26
Attending: SURGERY
Payer: COMMERCIAL

## 2022-08-26 ENCOUNTER — ANESTHESIA EVENT (OUTPATIENT)
Dept: RADIOLOGY | Facility: MEDICAL CENTER | Age: 32
DRG: 871 | End: 2022-08-26
Payer: COMMERCIAL

## 2022-08-26 ENCOUNTER — ANESTHESIA (OUTPATIENT)
Dept: RADIOLOGY | Facility: MEDICAL CENTER | Age: 32
DRG: 871 | End: 2022-08-26
Payer: COMMERCIAL

## 2022-08-26 LAB
ALBUMIN SERPL BCP-MCNC: 2.6 G/DL (ref 3.2–4.9)
ALBUMIN/GLOB SERPL: 0.7 G/DL
ALP SERPL-CCNC: 73 U/L (ref 30–99)
ALT SERPL-CCNC: 8 U/L (ref 2–50)
ANION GAP SERPL CALC-SCNC: 12 MMOL/L (ref 7–16)
AST SERPL-CCNC: 17 U/L (ref 12–45)
BILIRUB SERPL-MCNC: 0.4 MG/DL (ref 0.1–1.5)
BUN SERPL-MCNC: 4 MG/DL (ref 8–22)
CALCIUM SERPL-MCNC: 8.2 MG/DL (ref 8.5–10.5)
CHLORIDE SERPL-SCNC: 96 MMOL/L (ref 96–112)
CO2 SERPL-SCNC: 25 MMOL/L (ref 20–33)
CREAT SERPL-MCNC: 0.54 MG/DL (ref 0.5–1.4)
ERYTHROCYTE [DISTWIDTH] IN BLOOD BY AUTOMATED COUNT: 43.8 FL (ref 35.9–50)
GFR SERPLBLD CREATININE-BSD FMLA CKD-EPI: 127 ML/MIN/1.73 M 2
GLOBULIN SER CALC-MCNC: 3.5 G/DL (ref 1.9–3.5)
GLUCOSE SERPL-MCNC: 98 MG/DL (ref 65–99)
GRAM STN SPEC: NORMAL
HCT VFR BLD AUTO: 31.5 % (ref 37–47)
HGB BLD-MCNC: 10.7 G/DL (ref 12–16)
MCH RBC QN AUTO: 27.5 PG (ref 27–33)
MCHC RBC AUTO-ENTMCNC: 34 G/DL (ref 33.6–35)
MCV RBC AUTO: 81 FL (ref 81.4–97.8)
PLATELET # BLD AUTO: 293 K/UL (ref 164–446)
PMV BLD AUTO: 8.4 FL (ref 9–12.9)
POTASSIUM SERPL-SCNC: 3.4 MMOL/L (ref 3.6–5.5)
PROT SERPL-MCNC: 6.1 G/DL (ref 6–8.2)
RBC # BLD AUTO: 3.89 M/UL (ref 4.2–5.4)
SIGNIFICANT IND 70042: NORMAL
SITE SITE: NORMAL
SODIUM SERPL-SCNC: 133 MMOL/L (ref 135–145)
SOURCE SOURCE: NORMAL
WBC # BLD AUTO: 16.4 K/UL (ref 4.8–10.8)

## 2022-08-26 PROCEDURE — 700111 HCHG RX REV CODE 636 W/ 250 OVERRIDE (IP): Performed by: ANESTHESIOLOGY

## 2022-08-26 PROCEDURE — 160002 HCHG RECOVERY MINUTES (STAT)

## 2022-08-26 PROCEDURE — A9270 NON-COVERED ITEM OR SERVICE: HCPCS | Performed by: RADIOLOGY

## 2022-08-26 PROCEDURE — 700102 HCHG RX REV CODE 250 W/ 637 OVERRIDE(OP): Performed by: RADIOLOGY

## 2022-08-26 PROCEDURE — 85027 COMPLETE CBC AUTOMATED: CPT

## 2022-08-26 PROCEDURE — 700101 HCHG RX REV CODE 250

## 2022-08-26 PROCEDURE — 700105 HCHG RX REV CODE 258: Performed by: INTERNAL MEDICINE

## 2022-08-26 PROCEDURE — 01922 ANES N-INVAS IMG/RADJ THER: CPT | Performed by: ANESTHESIOLOGY

## 2022-08-26 PROCEDURE — A9270 NON-COVERED ITEM OR SERVICE: HCPCS | Performed by: INTERNAL MEDICINE

## 2022-08-26 PROCEDURE — 4410588 CT-DRAIN-PERITONEAL

## 2022-08-26 PROCEDURE — A9270 NON-COVERED ITEM OR SERVICE: HCPCS | Performed by: HOSPITALIST

## 2022-08-26 PROCEDURE — 770001 HCHG ROOM/CARE - MED/SURG/GYN PRIV*

## 2022-08-26 PROCEDURE — 87205 SMEAR GRAM STAIN: CPT

## 2022-08-26 PROCEDURE — 700111 HCHG RX REV CODE 636 W/ 250 OVERRIDE (IP): Performed by: INTERNAL MEDICINE

## 2022-08-26 PROCEDURE — 700102 HCHG RX REV CODE 250 W/ 637 OVERRIDE(OP): Performed by: HOSPITALIST

## 2022-08-26 PROCEDURE — 99232 SBSQ HOSP IP/OBS MODERATE 35: CPT | Performed by: INTERNAL MEDICINE

## 2022-08-26 PROCEDURE — 700101 HCHG RX REV CODE 250: Performed by: ANESTHESIOLOGY

## 2022-08-26 PROCEDURE — 87077 CULTURE AEROBIC IDENTIFY: CPT

## 2022-08-26 PROCEDURE — 87070 CULTURE OTHR SPECIMN AEROBIC: CPT

## 2022-08-26 PROCEDURE — 700102 HCHG RX REV CODE 250 W/ 637 OVERRIDE(OP)

## 2022-08-26 PROCEDURE — 87186 SC STD MICRODIL/AGAR DIL: CPT

## 2022-08-26 PROCEDURE — 36415 COLL VENOUS BLD VENIPUNCTURE: CPT

## 2022-08-26 PROCEDURE — 160035 HCHG PACU - 1ST 60 MINS PHASE I

## 2022-08-26 PROCEDURE — 87076 CULTURE ANAEROBE IDENT EACH: CPT

## 2022-08-26 PROCEDURE — 80053 COMPREHEN METABOLIC PANEL: CPT

## 2022-08-26 PROCEDURE — 700102 HCHG RX REV CODE 250 W/ 637 OVERRIDE(OP): Performed by: INTERNAL MEDICINE

## 2022-08-26 PROCEDURE — 99024 POSTOP FOLLOW-UP VISIT: CPT | Performed by: SURGERY

## 2022-08-26 PROCEDURE — A9270 NON-COVERED ITEM OR SERVICE: HCPCS

## 2022-08-26 RX ORDER — HYDROMORPHONE HYDROCHLORIDE 1 MG/ML
0.2 INJECTION, SOLUTION INTRAMUSCULAR; INTRAVENOUS; SUBCUTANEOUS
Status: DISCONTINUED | OUTPATIENT
Start: 2022-08-26 | End: 2022-08-26 | Stop reason: HOSPADM

## 2022-08-26 RX ORDER — OXYCODONE HYDROCHLORIDE 10 MG/1
10 TABLET ORAL
Status: DISPENSED | OUTPATIENT
Start: 2022-08-26 | End: 2022-08-27

## 2022-08-26 RX ORDER — OXYCODONE HCL 5 MG/5 ML
10 SOLUTION, ORAL ORAL
Status: DISCONTINUED | OUTPATIENT
Start: 2022-08-26 | End: 2022-08-26 | Stop reason: HOSPADM

## 2022-08-26 RX ORDER — LIDOCAINE HYDROCHLORIDE 20 MG/ML
INJECTION, SOLUTION INFILTRATION; PERINEURAL
Status: COMPLETED
Start: 2022-08-26 | End: 2022-08-26

## 2022-08-26 RX ORDER — HALOPERIDOL 5 MG/ML
1 INJECTION INTRAMUSCULAR
Status: DISCONTINUED | OUTPATIENT
Start: 2022-08-26 | End: 2022-08-26 | Stop reason: HOSPADM

## 2022-08-26 RX ORDER — SODIUM CHLORIDE, SODIUM LACTATE, POTASSIUM CHLORIDE, CALCIUM CHLORIDE 600; 310; 30; 20 MG/100ML; MG/100ML; MG/100ML; MG/100ML
INJECTION, SOLUTION INTRAVENOUS CONTINUOUS
Status: DISCONTINUED | OUTPATIENT
Start: 2022-08-26 | End: 2022-08-26 | Stop reason: HOSPADM

## 2022-08-26 RX ORDER — HYDROMORPHONE HYDROCHLORIDE 1 MG/ML
0.1 INJECTION, SOLUTION INTRAMUSCULAR; INTRAVENOUS; SUBCUTANEOUS
Status: DISCONTINUED | OUTPATIENT
Start: 2022-08-26 | End: 2022-08-26 | Stop reason: HOSPADM

## 2022-08-26 RX ORDER — PHENYLEPHRINE HYDROCHLORIDE 10 MG/ML
INJECTION, SOLUTION INTRAMUSCULAR; INTRAVENOUS; SUBCUTANEOUS PRN
Status: DISCONTINUED | OUTPATIENT
Start: 2022-08-26 | End: 2022-08-26 | Stop reason: SURG

## 2022-08-26 RX ORDER — ALBUTEROL SULFATE 2.5 MG/3ML
2.5 SOLUTION RESPIRATORY (INHALATION)
Status: DISCONTINUED | OUTPATIENT
Start: 2022-08-26 | End: 2022-08-26 | Stop reason: HOSPADM

## 2022-08-26 RX ORDER — HYDROMORPHONE HYDROCHLORIDE 1 MG/ML
0.4 INJECTION, SOLUTION INTRAMUSCULAR; INTRAVENOUS; SUBCUTANEOUS
Status: DISCONTINUED | OUTPATIENT
Start: 2022-08-26 | End: 2022-08-26 | Stop reason: HOSPADM

## 2022-08-26 RX ORDER — DIPHENHYDRAMINE HYDROCHLORIDE 50 MG/ML
12.5 INJECTION INTRAMUSCULAR; INTRAVENOUS
Status: DISCONTINUED | OUTPATIENT
Start: 2022-08-26 | End: 2022-08-26 | Stop reason: HOSPADM

## 2022-08-26 RX ORDER — ONDANSETRON 2 MG/ML
4 INJECTION INTRAMUSCULAR; INTRAVENOUS
Status: DISCONTINUED | OUTPATIENT
Start: 2022-08-26 | End: 2022-08-26 | Stop reason: HOSPADM

## 2022-08-26 RX ORDER — ONDANSETRON 2 MG/ML
INJECTION INTRAMUSCULAR; INTRAVENOUS PRN
Status: DISCONTINUED | OUTPATIENT
Start: 2022-08-26 | End: 2022-08-26 | Stop reason: SURG

## 2022-08-26 RX ORDER — DEXAMETHASONE SODIUM PHOSPHATE 4 MG/ML
INJECTION, SOLUTION INTRA-ARTICULAR; INTRALESIONAL; INTRAMUSCULAR; INTRAVENOUS; SOFT TISSUE PRN
Status: DISCONTINUED | OUTPATIENT
Start: 2022-08-26 | End: 2022-08-26 | Stop reason: SURG

## 2022-08-26 RX ORDER — LIDOCAINE HYDROCHLORIDE 40 MG/ML
SOLUTION TOPICAL
Status: DISPENSED
Start: 2022-08-26 | End: 2022-08-27

## 2022-08-26 RX ORDER — LIDOCAINE HYDROCHLORIDE 20 MG/ML
INJECTION, SOLUTION EPIDURAL; INFILTRATION; INTRACAUDAL; PERINEURAL PRN
Status: DISCONTINUED | OUTPATIENT
Start: 2022-08-26 | End: 2022-08-26 | Stop reason: SURG

## 2022-08-26 RX ORDER — OXYCODONE HYDROCHLORIDE 5 MG/1
5 TABLET ORAL
Status: ACTIVE | OUTPATIENT
Start: 2022-08-26 | End: 2022-08-27

## 2022-08-26 RX ORDER — OXYCODONE HCL 5 MG/5 ML
5 SOLUTION, ORAL ORAL
Status: DISCONTINUED | OUTPATIENT
Start: 2022-08-26 | End: 2022-08-26 | Stop reason: HOSPADM

## 2022-08-26 RX ADMIN — ACETAMINOPHEN 650 MG: 325 TABLET ORAL at 07:34

## 2022-08-26 RX ADMIN — DOXYCYCLINE 100 MG: 100 TABLET, FILM COATED ORAL at 04:08

## 2022-08-26 RX ADMIN — OXYCODONE 5 MG: 5 TABLET ORAL at 00:28

## 2022-08-26 RX ADMIN — DOCUSATE SODIUM 50 MG AND SENNOSIDES 8.6 MG 2 TABLET: 8.6; 5 TABLET, FILM COATED ORAL at 19:49

## 2022-08-26 RX ADMIN — PHENYLEPHRINE HYDROCHLORIDE 100 MCG: 10 INJECTION INTRAVENOUS at 15:54

## 2022-08-26 RX ADMIN — OXYCODONE HYDROCHLORIDE 10 MG: 10 TABLET ORAL at 09:00

## 2022-08-26 RX ADMIN — FENTANYL CITRATE 50 MCG: 50 INJECTION, SOLUTION INTRAMUSCULAR; INTRAVENOUS at 15:54

## 2022-08-26 RX ADMIN — OXYCODONE 5 MG: 5 TABLET ORAL at 04:08

## 2022-08-26 RX ADMIN — ONDANSETRON 4 MG: 2 INJECTION INTRAMUSCULAR; INTRAVENOUS at 10:20

## 2022-08-26 RX ADMIN — OXYCODONE HYDROCHLORIDE 10 MG: 10 TABLET ORAL at 19:43

## 2022-08-26 RX ADMIN — OXYCODONE HYDROCHLORIDE 10 MG: 10 TABLET ORAL at 22:28

## 2022-08-26 RX ADMIN — ROCURONIUM BROMIDE 50 MG: 10 INJECTION, SOLUTION INTRAVENOUS at 15:54

## 2022-08-26 RX ADMIN — LIDOCAINE HYDROCHLORIDE: 20 INJECTION, SOLUTION INFILTRATION; PERINEURAL at 15:15

## 2022-08-26 RX ADMIN — DEXAMETHASONE SODIUM PHOSPHATE 4 MG: 4 INJECTION, SOLUTION INTRA-ARTICULAR; INTRALESIONAL; INTRAMUSCULAR; INTRAVENOUS; SOFT TISSUE at 16:14

## 2022-08-26 RX ADMIN — OXYCODONE HYDROCHLORIDE 10 MG: 10 TABLET ORAL at 13:33

## 2022-08-26 RX ADMIN — DOCUSATE SODIUM 50 MG AND SENNOSIDES 8.6 MG 2 TABLET: 8.6; 5 TABLET, FILM COATED ORAL at 04:08

## 2022-08-26 RX ADMIN — ONDANSETRON 4 MG: 2 INJECTION INTRAMUSCULAR; INTRAVENOUS at 16:14

## 2022-08-26 RX ADMIN — LIDOCAINE HYDROCHLORIDE 100 MG: 20 INJECTION, SOLUTION EPIDURAL; INFILTRATION; INTRACAUDAL at 15:54

## 2022-08-26 RX ADMIN — PIPERACILLIN SODIUM AND TAZOBACTAM SODIUM 3.38 G: 3; .375 INJECTION, POWDER, FOR SOLUTION INTRAVENOUS at 10:21

## 2022-08-26 RX ADMIN — PROPOFOL 200 MG: 10 INJECTION, EMULSION INTRAVENOUS at 15:54

## 2022-08-26 RX ADMIN — PIPERACILLIN SODIUM AND TAZOBACTAM SODIUM 3.38 G: 3; .375 INJECTION, POWDER, FOR SOLUTION INTRAVENOUS at 02:30

## 2022-08-26 RX ADMIN — FENTANYL CITRATE 50 MCG: 50 INJECTION, SOLUTION INTRAMUSCULAR; INTRAVENOUS at 16:29

## 2022-08-26 ASSESSMENT — ENCOUNTER SYMPTOMS
COUGH: 0
NERVOUS/ANXIOUS: 0
CHILLS: 1
EYE REDNESS: 0
CONSTIPATION: 0
PALPITATIONS: 0
FALLS: 0
BLOOD IN STOOL: 0
LOSS OF CONSCIOUSNESS: 0
INSOMNIA: 0
SEIZURES: 0
VOMITING: 0
WHEEZING: 0
DIARRHEA: 0
FOCAL WEAKNESS: 0
MYALGIAS: 1
HEADACHES: 0
ABDOMINAL PAIN: 1
DIZZINESS: 0
NAUSEA: 0
TREMORS: 0
EYE PAIN: 0
SHORTNESS OF BREATH: 0
FEVER: 1
HEMOPTYSIS: 0
WEAKNESS: 0

## 2022-08-26 ASSESSMENT — PAIN SCALES - PAIN ASSESSMENT IN ADVANCED DEMENTIA (PAINAD)
BODYLANGUAGE: RELAXED
BREATHING: NORMAL
CONSOLABILITY: NO NEED TO CONSOLE
TOTALSCORE: 0
BODYLANGUAGE: RELAXED
FACIALEXPRESSION: SMILING OR INEXPRESSIVE
FACIALEXPRESSION: SMILING OR INEXPRESSIVE
CONSOLABILITY: NO NEED TO CONSOLE
TOTALSCORE: 0
BREATHING: NORMAL

## 2022-08-26 ASSESSMENT — COGNITIVE AND FUNCTIONAL STATUS - GENERAL
SUGGESTED CMS G CODE MODIFIER MOBILITY: CH
SUGGESTED CMS G CODE MODIFIER DAILY ACTIVITY: CH
MOBILITY SCORE: 24
DAILY ACTIVITIY SCORE: 24

## 2022-08-26 ASSESSMENT — PAIN DESCRIPTION - PAIN TYPE
TYPE: ACUTE PAIN

## 2022-08-26 ASSESSMENT — PAIN SCALES - GENERAL: PAIN_LEVEL: 2

## 2022-08-26 ASSESSMENT — PAIN SCALES - WONG BAKER
WONGBAKER_NUMERICALRESPONSE: HURTS A LITTLE MORE
WONGBAKER_NUMERICALRESPONSE: HURTS JUST A LITTLE BIT

## 2022-08-26 NOTE — PROGRESS NOTES
Patient requesting for compression stockings or SCD for DVT prevention. She also states she had liposuction and tummy tack after pregnancy before.Will give update to morning nurse to give a heads up to the MD.

## 2022-08-26 NOTE — PROGRESS NOTES
Received report from morning RN, Assumed care for this patient. Patient complaint of 6/10 abdominal pain. 3 lpm via NC of oxygen, No SOB/. Diminished breath sounds all lobes. Patient was upset and grace eyes sating that she feels like she was forced to do the procedure earlier today to remove the fluid on her abdomen and not just not tolerate it. Plan of care education provided, for abx treatment and repeat imaging studies. As well the 3pm schedule for anesthesia to continue the removal of the abdominal fluid but patient refused. Will let the morning RN be aware and charge RN for tonight regarding the patient's concern. Call light and personal items within reached. Can make her needs known. Up to toilet and urinated. Back to bed before this RN leaves the room.

## 2022-08-26 NOTE — PROGRESS NOTES
"  DATE: 8/26/2022    Hospital Day 5  abdominal pain .    INTERVAL EVENTS:  Aborted percutaneous drainage.    PHYSICAL EXAMINATION:  Vital Signs: /62   Pulse (!) 106   Temp 37.4 °C (99.3 °F) (Temporal)   Resp 18   Ht 1.702 m (5' 7\")   Wt 83 kg (183 lb)   SpO2 95%     ASSESSMENT AND PLAN:     Refusing percutaneous drainage of right lower quadrant fluid collection under anesthesia later today.  Does not want operative drainage either.  Will await definitive treatment decisions by the patient and her primary team.     ____________________________________     Ricky Dawkins M.D.    DD: 8/26/2022  7:44 AM    "

## 2022-08-26 NOTE — CARE PLAN
The patient is Stable - Low risk of patient condition declining or worsening    Shift Goals  Clinical Goals: Pain management;  Patient Goals: pain mgt, sleeps well  Family Goals: n/a    Progress made toward(s) clinical / shift goals:  See below      Problem: Pain - Standard  Goal: Alleviation of pain or a reduction in pain to the patient’s comfort goal  Outcome: Progressing  Note: Patient states that oxycodone is causing her extreme dry mouth and requests Tylenol be increased to 1000 mg.      Problem: Knowledge Deficit - Standard  Goal: Patient and family/care givers will demonstrate understanding of plan of care, disease process/condition, diagnostic tests and medications  Outcome: Progressing

## 2022-08-26 NOTE — OR SURGEON
Immediate Post- Operative Note        Findings: Pelvic Abscess      Procedure(s):  CT guided  pelvic abscess drain      Estimated Blood Loss: Less than 5 ml        Complications: None            8/26/2022     4:22 PM     Aly Arshad M.D.

## 2022-08-26 NOTE — ANESTHESIA TIME REPORT
Anesthesia Start and Stop Event Times     Date Time Event    8/26/2022 1545 Ready for Procedure     1553 Anesthesia Start     1640 Anesthesia Stop        Responsible Staff  08/26/22    Name Role Begin End    Ismael Vogel M.D. Anesth 1553 1640        Overtime Reason:  no overtime (within assigned shift)    Comments:

## 2022-08-26 NOTE — PROGRESS NOTES
[facility] Medicine Daily Progress Note    Date of Service  8/26/2022    Chief Complaint  Brunilda Webber is a 32 y.o. female admitted 8/22/2022 with Abdominal Pain (Pt states she's been having abdominal pain since 8/17/2022. Pain has been progressively getting worse since then.), Vaginal Bleeding (Bleeding since Saturday, unsure if this is her menstrual cycle or not.  ), Fever (Pt states she had a fever of 100.8 at home for which she took ibuprofen. ), and N/V      Hospital Course  No notes on file  32 y.o. female who presented 8/22/2022 with abdominal pain and fever. Ms. Webber has previously known medical conditions that had an undergone in vitro fertilization and developed abdominal pain July 27 was found to have both a tubal and intrauterine pregnancy and underwent salpingectomy.  Intraoperatively she was found to have acute appendicitis therefore she also underwent appendectomy during that surgery.  Subsequently the intrauterine pregnancy was found to not be viable therefore underwent dilation and curettage on 8/6/2022.  On around 8/16/2022 she developed body aches, chills, fatigue with worsening abdominal pain.  Presented to urgent care where a COVID swab was negative and she was referred to the emergency room she also reported vaginal bleeding.  She underwent CAT scan of the abdomen pelvis in the emergency room revealing small to moderate ascites as well as hepatosplenomegaly.  She had a normal white count though elevated lactic acid at 3.3.  She did have a positive urinalysis though no urinary symptoms.  Given her hypotension that was refractory to IV fluids, she was admitted to the intensive care unit with a working diagnosis of septic shock.  Gynecology and surgery were consulted.     8/23/2022: Patient seen and evaluated in the ICU.  He continues to have significant abdominal pain now is more left-sided in nature whereas yesterday it was diffuse.  She is being scheduled for IR for drainage of the ascitic  "fluid.  She remains on IV Zosyn.  Cultures remain pending.  IV fluids have been restarted as she is NPO.\"  Dr. Austin    Interval Problem Update  8/24. Discussed with prior attending. No enough fluid to do a diagnostic tap. Blood culture so far negative. Discussed with Dr. Maldonado and if can;t tap will keep IV Abx for 5 days then reimage  UPDATE: After further review, IR was contacted and will try to get diagnostic aspiration of pelvic abscess tomorrow.  8/25. IR unable to do the aspiration. Patient could not tolerate lying on stomach.   May need to repeat CT tomorrow instead.  8/26. After further clarification, she is opting to do IR as long as IR will explain the process. I explained that she may get a drain if there is enough fluid to drain.      I have discussed this patient's plan of care and discharge plan at IDT rounds today with Case Management, Nursing, Nursing leadership, and other members of the IDT team.    Consultants/Specialty      Code Status  Full Code    Disposition  Patient is not medically cleared for discharge.   Anticipate discharge to TBD  I have placed the appropriate orders for post-discharge needs.    Review of Systems  Review of Systems   Constitutional:  Positive for chills and fever.   HENT:  Negative for congestion, hearing loss and nosebleeds.    Eyes:  Negative for pain and redness.   Respiratory:  Negative for cough, hemoptysis, shortness of breath and wheezing.    Cardiovascular:  Negative for chest pain and palpitations.   Gastrointestinal:  Positive for abdominal pain. Negative for blood in stool, constipation, diarrhea, nausea and vomiting.   Genitourinary:  Negative for dysuria, frequency and hematuria.   Musculoskeletal:  Positive for myalgias. Negative for falls and joint pain.   Skin:  Negative for rash.   Neurological:  Negative for dizziness, tremors, focal weakness, seizures, loss of consciousness, weakness and headaches.   Psychiatric/Behavioral:  The patient is not " nervous/anxious and does not have insomnia.    All other systems reviewed and are negative.     Physical Exam  Temp:  [36.5 °C (97.7 °F)-37.4 °C (99.3 °F)] 36.5 °C (97.7 °F)  Pulse:  [106-117] 114  Resp:  [16-19] 19  BP: (103-106)/(62-66) 106/64  SpO2:  [90 %-96 %] 90 %    Physical Exam  Vitals and nursing note reviewed.   Constitutional:       General: She is not in acute distress.     Comments: Malaised   HENT:      Head: Normocephalic and atraumatic.      Right Ear: External ear normal.      Left Ear: External ear normal.      Nose: Nose normal.      Mouth/Throat:      Mouth: Mucous membranes are moist.   Eyes:      General: No scleral icterus.     Conjunctiva/sclera: Conjunctivae normal.   Cardiovascular:      Rate and Rhythm: Normal rate and regular rhythm.      Pulses: Normal pulses.      Heart sounds: No murmur heard.    No friction rub. No gallop.   Pulmonary:      Effort: Pulmonary effort is normal. No respiratory distress.      Breath sounds: Normal breath sounds. No stridor. No wheezing, rhonchi or rales.   Chest:      Chest wall: No tenderness.   Abdominal:      General: Abdomen is flat. Bowel sounds are normal. There is no distension.      Palpations: Abdomen is soft.      Tenderness: There is abdominal tenderness. There is no guarding or rebound.   Musculoskeletal:         General: No swelling, tenderness or deformity. Normal range of motion.      Cervical back: Normal range of motion and neck supple. No rigidity.   Skin:     General: Skin is warm.      Coloration: Skin is not jaundiced.   Neurological:      General: No focal deficit present.      Mental Status: She is alert and oriented to person, place, and time. Mental status is at baseline.      Motor: Weakness present.   Psychiatric:         Mood and Affect: Mood normal.         Behavior: Behavior normal.         Thought Content: Thought content normal.         Judgment: Judgment normal.      Comments: Can get irritable       Fluids  No intake or  output data in the 24 hours ending 08/26/22 1425      Laboratory  Recent Labs     08/24/22  1021 08/25/22  0458 08/26/22  1004   WBC 19.5* 18.7* 16.4*   RBC 3.87* 3.84* 3.89*   HEMOGLOBIN 10.6* 10.5* 10.7*   HEMATOCRIT 31.2* 30.7* 31.5*   MCV 80.6* 79.9* 81.0*   MCH 27.4 27.3 27.5   MCHC 34.0 34.2 34.0   RDW 43.7 43.6 43.8   PLATELETCT 269 296 293   MPV 8.5* 8.5* 8.4*       Recent Labs     08/24/22  1021 08/25/22  0458 08/26/22  1004   SODIUM 125* 129* 133*   POTASSIUM 3.6 3.8 3.4*   CHLORIDE 91* 92* 96   CO2 24 27 25   GLUCOSE 110* 102* 98   BUN 6* 4* 4*   CREATININE 0.57 0.60 0.54   CALCIUM 8.1* 7.9* 8.2*       Recent Labs     08/24/22  1021   INR 1.38*                 Imaging  US-EXTREMITY VENOUS LOWER UNILAT LEFT   Final Result      DX-CHEST-PORTABLE (1 VIEW)   Final Result         1.  Left lower lobe infiltrate.   2.  Trace left pleural effusion      US-ABDOMEN LTD (SOFT TISSUE)   Final Result      Mild four-quadrant free fluid.      Loculated rim-enhancing fluid collection is suggested within the pelvis/cul-de-sac on the CT, possible abscess.      US-PELVIC TRANSABDOMINAL ONLY   Final Result      1.  Limited transabdominal study due to pain as well as patient refusing endovaginal exam.   2.  Note definite uterine abnormality seen.   3.  Ovaries are not visualized, obscured by bowel gas.      CT-ABDOMEN-PELVIS WITH   Final Result      1.  Hepatosplenomegaly.      2.  Small to moderate ascites throughout the abdomen and pelvis.      3.  Bilateral ovarian cysts, right greater than left.      DX-CHEST-PORTABLE (1 VIEW)   Final Result      No acute cardiac or pulmonary abnormalities are identified.      CT-DRAIN-PERITONEAL    (Results Pending)          Assessment/Plan  * Septic shock, ascites, possible pelvic abscess (HCC)- (present on admission)  Assessment & Plan  This is Septic shock Present on admission  SIRS criteria identified on my evaluation include: Fever, with temperature greater than 101 deg  "F  Indicators of septic shock include: Severe sepsis present and persistent hypotension after 30 ml/kg completed.   Sources is: abdominal source  Sepsis protocol initiated  Crystalloid Fluid Administration: Fluid resuscitation ordered per standard protocol - 30 mL/kg per current or ideal body weight  IV antibiotics as appropriate for source of sepsis IV Zosyn  Reassessment: I have reassessed the patient's hemodynamic status  IR consulted for aspiration of fluid for culture/cells. Hold Lovenox. Restart IV fluids.\"    Discussed with Dr. Maldonado  IR aspiration failed  CT in 1 or 2 days        Hyponatremia- (present on admission)  Assessment & Plan  hypovolemic    Hepatosplenomegaly- (present on admission)  Assessment & Plan  Noted on CT scan    Generalized abdominal pain- (present on admission)  Assessment & Plan  Initially diffuse now left lower quadrant.  Increase the frequency of dilaudid to hourly while NPO       VTE prophylaxis: SCDs/TEDs    I have performed a physical exam and reviewed and updated ROS and Plan today (8/26/2022). In review of yesterday's note (8/25/2022), there are no changes except as documented above.        "

## 2022-08-26 NOTE — CARE PLAN
The patient is Stable - Low risk of patient condition declining or worsening    Shift Goals  Clinical Goals: pain mgt, iv abx, rest and sleeps comfortably  Patient Goals: pain mgt, sleeps well  Family Goals: n/a      Problem: Pain - Standard  Goal: Alleviation of pain or a reduction in pain to the patient’s comfort goal  Outcome: Progressing     Problem: Knowledge Deficit - Standard  Goal: Patient and family/care givers will demonstrate understanding of plan of care, disease process/condition, diagnostic tests and medications  Outcome: Progressing       Progress made toward(s) clinical / shift goals:  Continue IV abx and pain mgt. Refused to undergo the scheduled procedure tomorrow for abdominal fluid removal. Plan of care discussed. Call light and personal items within reached.     Patient is not progressing towards the following goals:

## 2022-08-26 NOTE — ANESTHESIA PROCEDURE NOTES
Airway    Date/Time: 8/26/2022 3:55 PM  Performed by: Ismael Vogel M.D.  Authorized by: Ismael Vogel M.D.     Location:  OR  Urgency:  Elective  Indications for Airway Management:  Anesthesia      Spontaneous Ventilation: absent    Sedation Level:  Deep  Preoxygenated: Yes    Patient Position:  Sniffing  Mask Difficulty Assessment:  0 - not attempted  Final Airway Type:  Endotracheal airway  Final Endotracheal Airway:  ETT  Cuffed: Yes    Technique Used for Successful ETT Placement:  Direct laryngoscopy    Insertion Site:  Oral  Blade Type:  Marcela  Laryngoscope Blade/Videolaryngoscope Blade Size:  3  ETT Size (mm):  7.0  Measured from:  Lips  Placement Verified by: auscultation and capnometry    Cormack-Lehane Classification:  Grade I - full view of glottis  Number of Attempts at Approach:  1  Number of Other Approaches Attempted:  0

## 2022-08-26 NOTE — ANESTHESIA PREPROCEDURE EVALUATION
Date/Time: 08/26/22 1530    Scheduled providers: Aly Arshad M.D.; Ismael Vogel M.D.    Procedure: CT-DRAIN-PERITONEAL    Diagnosis:       Sepsis (HCC) [A41.9]      Sepsis (HCC) [A41.9]    Indications: pelvic fluid collection    Location: [facility]          Relevant Problems      (positive) Hepatosplenomegaly      Other   (positive) Generalized abdominal pain   (positive) Hepatosplenomegaly   (positive) Septic shock, ascites, possible pelvic abscess (HCC)       Physical Exam    Airway   Mallampati: II  TM distance: >3 FB  Neck ROM: full       Cardiovascular - normal exam  Rhythm: regular  Rate: normal  (-) murmur     Dental - normal exam           Pulmonary - normal exam  Breath sounds clear to auscultation     Abdominal    Neurological - normal exam                 Anesthesia Plan    ASA 2       Plan - general       Airway plan will be ETT          Induction: intravenous    Postoperative Plan: Postoperative administration of opioids is intended.    Pertinent diagnostic labs and testing reviewed    Informed Consent:    Anesthetic plan and risks discussed with patient.    Use of blood products discussed with: patient whom consented to blood products.

## 2022-08-26 NOTE — PROGRESS NOTES
Received report and assumed care of patient (pt). Pt is A&O x 4. Pt reports 3/10 pain in her upper abdomen. Pt medicated with Tylenol per MAR. Vital signs are stable on room air with the following exceptions: HR is 114. Bed is in lowest, locked position, call light and belongings are within reach. Pt calls for assistance and bed alarm is off as patient is up independently.  All other needs met at this time.

## 2022-08-27 ENCOUNTER — APPOINTMENT (OUTPATIENT)
Dept: RADIOLOGY | Facility: MEDICAL CENTER | Age: 32
DRG: 871 | End: 2022-08-27
Attending: INTERNAL MEDICINE
Payer: COMMERCIAL

## 2022-08-27 LAB
ALBUMIN SERPL BCP-MCNC: 2.9 G/DL (ref 3.2–4.9)
ALBUMIN/GLOB SERPL: 0.9 G/DL
ALP SERPL-CCNC: 74 U/L (ref 30–99)
ALT SERPL-CCNC: 9 U/L (ref 2–50)
ANION GAP SERPL CALC-SCNC: 11 MMOL/L (ref 7–16)
AST SERPL-CCNC: 16 U/L (ref 12–45)
BACTERIA BLD CULT: NORMAL
BACTERIA BLD CULT: NORMAL
BILIRUB SERPL-MCNC: 0.3 MG/DL (ref 0.1–1.5)
BUN SERPL-MCNC: 6 MG/DL (ref 8–22)
CALCIUM SERPL-MCNC: 8.1 MG/DL (ref 8.5–10.5)
CHLORIDE SERPL-SCNC: 98 MMOL/L (ref 96–112)
CO2 SERPL-SCNC: 25 MMOL/L (ref 20–33)
CREAT SERPL-MCNC: 0.45 MG/DL (ref 0.5–1.4)
ERYTHROCYTE [DISTWIDTH] IN BLOOD BY AUTOMATED COUNT: 44.4 FL (ref 35.9–50)
GFR SERPLBLD CREATININE-BSD FMLA CKD-EPI: 132 ML/MIN/1.73 M 2
GLOBULIN SER CALC-MCNC: 3.3 G/DL (ref 1.9–3.5)
GLUCOSE SERPL-MCNC: 109 MG/DL (ref 65–99)
HCT VFR BLD AUTO: 30.4 % (ref 37–47)
HGB BLD-MCNC: 10.2 G/DL (ref 12–16)
MCH RBC QN AUTO: 27.2 PG (ref 27–33)
MCHC RBC AUTO-ENTMCNC: 33.6 G/DL (ref 33.6–35)
MCV RBC AUTO: 81.1 FL (ref 81.4–97.8)
PLATELET # BLD AUTO: 297 K/UL (ref 164–446)
PMV BLD AUTO: 8.5 FL (ref 9–12.9)
POTASSIUM SERPL-SCNC: 3.9 MMOL/L (ref 3.6–5.5)
PROT SERPL-MCNC: 6.2 G/DL (ref 6–8.2)
RBC # BLD AUTO: 3.75 M/UL (ref 4.2–5.4)
SIGNIFICANT IND 70042: NORMAL
SIGNIFICANT IND 70042: NORMAL
SITE SITE: NORMAL
SITE SITE: NORMAL
SODIUM SERPL-SCNC: 134 MMOL/L (ref 135–145)
SOURCE SOURCE: NORMAL
SOURCE SOURCE: NORMAL
WBC # BLD AUTO: 18.1 K/UL (ref 4.8–10.8)

## 2022-08-27 PROCEDURE — 99231 SBSQ HOSP IP/OBS SF/LOW 25: CPT | Performed by: SURGERY

## 2022-08-27 PROCEDURE — 85027 COMPLETE CBC AUTOMATED: CPT

## 2022-08-27 PROCEDURE — 770001 HCHG ROOM/CARE - MED/SURG/GYN PRIV*

## 2022-08-27 PROCEDURE — 700105 HCHG RX REV CODE 258: Performed by: INTERNAL MEDICINE

## 2022-08-27 PROCEDURE — 700111 HCHG RX REV CODE 636 W/ 250 OVERRIDE (IP): Performed by: INTERNAL MEDICINE

## 2022-08-27 PROCEDURE — 700102 HCHG RX REV CODE 250 W/ 637 OVERRIDE(OP): Performed by: INTERNAL MEDICINE

## 2022-08-27 PROCEDURE — A9270 NON-COVERED ITEM OR SERVICE: HCPCS | Performed by: HOSPITALIST

## 2022-08-27 PROCEDURE — 36415 COLL VENOUS BLD VENIPUNCTURE: CPT

## 2022-08-27 PROCEDURE — A9270 NON-COVERED ITEM OR SERVICE: HCPCS | Performed by: INTERNAL MEDICINE

## 2022-08-27 PROCEDURE — 80053 COMPREHEN METABOLIC PANEL: CPT

## 2022-08-27 PROCEDURE — 99233 SBSQ HOSP IP/OBS HIGH 50: CPT | Performed by: INTERNAL MEDICINE

## 2022-08-27 PROCEDURE — 700102 HCHG RX REV CODE 250 W/ 637 OVERRIDE(OP): Performed by: HOSPITALIST

## 2022-08-27 PROCEDURE — A9270 NON-COVERED ITEM OR SERVICE: HCPCS

## 2022-08-27 PROCEDURE — 700102 HCHG RX REV CODE 250 W/ 637 OVERRIDE(OP)

## 2022-08-27 RX ADMIN — OXYCODONE HYDROCHLORIDE 10 MG: 10 TABLET ORAL at 21:24

## 2022-08-27 RX ADMIN — OXYCODONE HYDROCHLORIDE 10 MG: 10 TABLET ORAL at 10:41

## 2022-08-27 RX ADMIN — PIPERACILLIN SODIUM AND TAZOBACTAM SODIUM 3.38 G: 3; .375 INJECTION, POWDER, FOR SOLUTION INTRAVENOUS at 10:26

## 2022-08-27 RX ADMIN — DOXYCYCLINE 100 MG: 100 TABLET, FILM COATED ORAL at 04:57

## 2022-08-27 RX ADMIN — OXYCODONE HYDROCHLORIDE 10 MG: 10 TABLET ORAL at 02:25

## 2022-08-27 RX ADMIN — PIPERACILLIN SODIUM AND TAZOBACTAM SODIUM 3.38 G: 3; .375 INJECTION, POWDER, FOR SOLUTION INTRAVENOUS at 17:09

## 2022-08-27 RX ADMIN — DOXYCYCLINE 100 MG: 100 TABLET, FILM COATED ORAL at 17:09

## 2022-08-27 RX ADMIN — OXYCODONE HYDROCHLORIDE 10 MG: 10 TABLET ORAL at 07:34

## 2022-08-27 RX ADMIN — ACETAMINOPHEN 650 MG: 325 TABLET ORAL at 09:16

## 2022-08-27 RX ADMIN — OXYCODONE HYDROCHLORIDE 10 MG: 10 TABLET ORAL at 14:46

## 2022-08-27 RX ADMIN — DOCUSATE SODIUM 50 MG AND SENNOSIDES 8.6 MG 2 TABLET: 8.6; 5 TABLET, FILM COATED ORAL at 17:09

## 2022-08-27 RX ADMIN — ONDANSETRON 4 MG: 2 INJECTION INTRAMUSCULAR; INTRAVENOUS at 10:26

## 2022-08-27 RX ADMIN — OXYCODONE HYDROCHLORIDE 10 MG: 10 TABLET ORAL at 18:13

## 2022-08-27 RX ADMIN — DOCUSATE SODIUM 50 MG AND SENNOSIDES 8.6 MG 2 TABLET: 8.6; 5 TABLET, FILM COATED ORAL at 04:57

## 2022-08-27 ASSESSMENT — ENCOUNTER SYMPTOMS
TREMORS: 0
WEAKNESS: 0
SEIZURES: 0
CONSTIPATION: 0
SHORTNESS OF BREATH: 0
NAUSEA: 0
PALPITATIONS: 0
ABDOMINAL PAIN: 1
NERVOUS/ANXIOUS: 0
DIARRHEA: 0
COUGH: 0
BLOOD IN STOOL: 0
WHEEZING: 0
FOCAL WEAKNESS: 0
HEADACHES: 0
MYALGIAS: 1
CHILLS: 1
FEVER: 1
FALLS: 0
EYE PAIN: 0
EYE REDNESS: 0
INSOMNIA: 0
HEMOPTYSIS: 0
DIZZINESS: 0
LOSS OF CONSCIOUSNESS: 0
VOMITING: 0

## 2022-08-27 ASSESSMENT — PAIN DESCRIPTION - PAIN TYPE
TYPE: ACUTE PAIN
TYPE: ACUTE PAIN

## 2022-08-27 ASSESSMENT — FIBROSIS 4 INDEX: FIB4 SCORE: 0.62

## 2022-08-27 NOTE — PROGRESS NOTES
"  DATE: 8/27/2022    Hospital Day 6 abdominal pain.    INTERVAL EVENTS:  CT-guided percutaneous drainage of right lower quadrant fluid collection.    PHYSICAL EXAMINATION:  Vital Signs: /59   Pulse 87   Temp 36.7 °C (98 °F) (Temporal)   Resp 20   Ht 1.702 m (5' 7\")   Wt 88.5 kg (195 lb 1.7 oz)   SpO2 94%     ASSESSMENT AND PLAN:     Successful CT-guided right lower quadrant percutaneous drain placement.  Continue external drainage and await fluid indices.       ____________________________________     Ricky Dawkins M.D.    DD: 8/27/2022  7:25 AM    "

## 2022-08-27 NOTE — PROGRESS NOTES
Assumed care of patient at 0700 from Rebecca CHOW. Patient is A&O x4, states pain level is 7/10, medicated patient per MAR. Bed locked in lowest position with 2 rail up. Call light  in place, belongings at bedside. Hourly rounding is in place.

## 2022-08-27 NOTE — CARE PLAN
The patient is Watcher - Medium risk of patient condition declining or worsening    Shift Goals  Clinical Goals: Pain management;  Patient Goals: pain control and rest  Family Goals: n/a    Progress made toward(s) clinical / shift goals:  vitals are stable    Patient is not progressing towards the following goals:

## 2022-08-27 NOTE — ANESTHESIA POSTPROCEDURE EVALUATION
Patient: Brunilda Webber    Procedure Summary     Date: 08/26/22 Room / Location: [facility]    Anesthesia Start: 1553 Anesthesia Stop: 1640    Procedure: CT-DRAIN-PERITONEAL Diagnosis:       Sepsis (HCC)      Sepsis (HCC)      (pelvic fluid collection)    Scheduled Providers: Aly Arshad M.D.; Ismael Vogel M.D. Responsible Provider: Ismael Vogel M.D.    Anesthesia Type: general ASA Status: 2          Final Anesthesia Type: general  Last vitals  BP   Blood Pressure: 116/70    Temp   36.8 °C (98.2 °F)    Pulse   (!) 101   Resp   20    SpO2   91 %      Anesthesia Post Evaluation    Patient location during evaluation: PACU  Patient participation: complete - patient participated  Level of consciousness: awake and alert and sleepy but conscious  Pain score: 2    Airway patency: patent  Anesthetic complications: no  Cardiovascular status: hemodynamically stable  Respiratory status: acceptable  Hydration status: euvolemic    PONV: none          There were no known notable events for this encounter.     Nurse Pain Score: 0 (NPRS)

## 2022-08-27 NOTE — OR NURSING
Pt A&OX4. VSS. Sinus tachycardia on monitor. Pt on room air. Afebrile. Post IR procedure, LESLIE drain in place to L buttock. Dressing CDI. LESLIE drain with 20 cc of serosanguinous output and compressed to bulb suction. Pt denies pain or nausea. Pt able to void X2 while in PACU, clear yellow urine but unable to measure. Report called to GERALDO Rizo.

## 2022-08-27 NOTE — PROGRESS NOTES
[facility] Medicine Daily Progress Note    Date of Service  8/27/2022    Chief Complaint  Brunilda Webber is a 32 y.o. female admitted 8/22/2022 with Abdominal Pain (Pt states she's been having abdominal pain since 8/17/2022. Pain has been progressively getting worse since then.), Vaginal Bleeding (Bleeding since Saturday, unsure if this is her menstrual cycle or not.  ), Fever (Pt states she had a fever of 100.8 at home for which she took ibuprofen. ), and N/V      Hospital Course  No notes on file  32 y.o. female who presented 8/22/2022 with abdominal pain and fever. Ms. Webber has previously known medical conditions that had an undergone in vitro fertilization and developed abdominal pain July 27 was found to have both a tubal and intrauterine pregnancy and underwent salpingectomy.  Intraoperatively she was found to have acute appendicitis therefore she also underwent appendectomy during that surgery.  Subsequently the intrauterine pregnancy was found to not be viable therefore underwent dilation and curettage on 8/6/2022.  On around 8/16/2022 she developed body aches, chills, fatigue with worsening abdominal pain.  Presented to urgent care where a COVID swab was negative and she was referred to the emergency room she also reported vaginal bleeding.  She underwent CAT scan of the abdomen pelvis in the emergency room revealing small to moderate ascites as well as hepatosplenomegaly.  She had a normal white count though elevated lactic acid at 3.3.  She did have a positive urinalysis though no urinary symptoms.  Given her hypotension that was refractory to IV fluids, she was admitted to the intensive care unit with a working diagnosis of septic shock.  Gynecology and surgery were consulted.     8/23/2022: Patient seen and evaluated in the ICU.  He continues to have significant abdominal pain now is more left-sided in nature whereas yesterday it was diffuse.  She is being scheduled for IR for drainage of the ascitic  "fluid.  She remains on IV Zosyn.  Cultures remain pending.  IV fluids have been restarted as she is NPO.\"  Dr. Austin    Interval Problem Update  8/24. Discussed with prior attending. No enough fluid to do a diagnostic tap. Blood culture so far negative. Discussed with Dr. Maldonado and if can;t tap will keep IV Abx for 5 days then reimage  UPDATE: After further review, IR was contacted and will try to get diagnostic aspiration of pelvic abscess tomorrow.  8/25. IR unable to do the aspiration. Patient could not tolerate lying on stomach.   May need to repeat CT tomorrow instead.  8/26. After further clarification, she is opting to do IR as long as IR will explain the process. I explained that she may get a drain if there is enough fluid to drain.  8/27. S/p drain. Minimal serosanguinous output. Surgery following.     I have discussed this patient's plan of care and discharge plan at IDT rounds today with Case Management, Nursing, Nursing leadership, and other members of the IDT team.    Consultants/Specialty      Code Status  Full Code    Disposition  Patient is not medically cleared for discharge.   Anticipate discharge to TBD  I have placed the appropriate orders for post-discharge needs.    Review of Systems  Review of Systems   Constitutional:  Positive for chills and fever.   HENT:  Negative for congestion, hearing loss and nosebleeds.    Eyes:  Negative for pain and redness.   Respiratory:  Negative for cough, hemoptysis, shortness of breath and wheezing.    Cardiovascular:  Negative for chest pain and palpitations.   Gastrointestinal:  Positive for abdominal pain. Negative for blood in stool, constipation, diarrhea, nausea and vomiting.   Genitourinary:  Negative for dysuria, frequency and hematuria.   Musculoskeletal:  Positive for myalgias. Negative for falls and joint pain.   Skin:  Negative for rash.   Neurological:  Negative for dizziness, tremors, focal weakness, seizures, loss of consciousness, weakness " and headaches.   Psychiatric/Behavioral:  The patient is not nervous/anxious and does not have insomnia.    All other systems reviewed and are negative.     Physical Exam  Temp:  [36.1 °C (96.9 °F)-36.9 °C (98.4 °F)] 36.1 °C (96.9 °F)  Pulse:  [] 67  Resp:  [15-20] 15  BP: (106-122)/(59-72) 108/66  SpO2:  [91 %-100 %] 94 %    Physical Exam  Vitals and nursing note reviewed.   Constitutional:       General: She is not in acute distress.     Comments: Malaised   HENT:      Head: Normocephalic and atraumatic.      Right Ear: External ear normal.      Left Ear: External ear normal.      Nose: Nose normal.      Mouth/Throat:      Mouth: Mucous membranes are moist.   Eyes:      General: No scleral icterus.     Conjunctiva/sclera: Conjunctivae normal.   Cardiovascular:      Rate and Rhythm: Normal rate and regular rhythm.      Pulses: Normal pulses.      Heart sounds: No murmur heard.    No friction rub. No gallop.   Pulmonary:      Effort: Pulmonary effort is normal. No respiratory distress.      Breath sounds: Normal breath sounds. No stridor. No wheezing, rhonchi or rales.   Chest:      Chest wall: No tenderness.   Abdominal:      General: Abdomen is flat. Bowel sounds are normal. There is no distension.      Palpations: Abdomen is soft.      Tenderness: There is abdominal tenderness. There is no guarding or rebound.      Comments: Drain noted. Minimal serosanguinous output.   Musculoskeletal:         General: No swelling, tenderness or deformity. Normal range of motion.      Cervical back: Normal range of motion and neck supple. No rigidity.   Skin:     General: Skin is warm.      Coloration: Skin is not jaundiced.   Neurological:      General: No focal deficit present.      Mental Status: She is alert and oriented to person, place, and time. Mental status is at baseline.      Motor: Weakness present.   Psychiatric:         Mood and Affect: Mood normal.         Behavior: Behavior normal.         Thought Content:  Thought content normal.         Judgment: Judgment normal.      Comments: Can get irritable       Fluids    Intake/Output Summary (Last 24 hours) at 8/27/2022 0916  Last data filed at 8/27/2022 0400  Gross per 24 hour   Intake 500 ml   Output 61 ml   Net 439 ml         Laboratory  Recent Labs     08/25/22  0458 08/26/22  1004 08/27/22  0710   WBC 18.7* 16.4* 18.1*   RBC 3.84* 3.89* 3.75*   HEMOGLOBIN 10.5* 10.7* 10.2*   HEMATOCRIT 30.7* 31.5* 30.4*   MCV 79.9* 81.0* 81.1*   MCH 27.3 27.5 27.2   MCHC 34.2 34.0 33.6   RDW 43.6 43.8 44.4   PLATELETCT 296 293 297   MPV 8.5* 8.4* 8.5*       Recent Labs     08/25/22  0458 08/26/22  1004 08/27/22  0710   SODIUM 129* 133* 134*   POTASSIUM 3.8 3.4* 3.9   CHLORIDE 92* 96 98   CO2 27 25 25   GLUCOSE 102* 98 109*   BUN 4* 4* 6*   CREATININE 0.60 0.54 0.45*   CALCIUM 7.9* 8.2* 8.1*       Recent Labs     08/24/22  1021   INR 1.38*                 Imaging  IR-US GUIDED PIV   Final Result    Ultrasound-guided PERIPHERAL IV INSERTION performed by    qualified nursing staff as above.      CT-DRAIN-PERITONEAL   Final Result      1.  CT guided pelvic abscess catheter drainage.   2.  The current plan is to obtain a follow-up CT in 5-7 days..      US-EXTREMITY VENOUS LOWER UNILAT LEFT   Final Result      DX-CHEST-PORTABLE (1 VIEW)   Final Result         1.  Left lower lobe infiltrate.   2.  Trace left pleural effusion      US-ABDOMEN LTD (SOFT TISSUE)   Final Result      Mild four-quadrant free fluid.      Loculated rim-enhancing fluid collection is suggested within the pelvis/cul-de-sac on the CT, possible abscess.      US-PELVIC TRANSABDOMINAL ONLY   Final Result      1.  Limited transabdominal study due to pain as well as patient refusing endovaginal exam.   2.  Note definite uterine abnormality seen.   3.  Ovaries are not visualized, obscured by bowel gas.      CT-ABDOMEN-PELVIS WITH   Final Result      1.  Hepatosplenomegaly.      2.  Small to moderate ascites throughout the abdomen  "and pelvis.      3.  Bilateral ovarian cysts, right greater than left.      DX-CHEST-PORTABLE (1 VIEW)   Final Result      No acute cardiac or pulmonary abnormalities are identified.             Assessment/Plan  * Septic shock, ascites, possible pelvic abscess (HCC)- (present on admission)  Assessment & Plan  This is Septic shock Present on admission  SIRS criteria identified on my evaluation include: Fever, with temperature greater than 101 deg F  Indicators of septic shock include: Severe sepsis present and persistent hypotension after 30 ml/kg completed.   Sources is: abdominal source  Sepsis protocol initiated  Crystalloid Fluid Administration: Fluid resuscitation ordered per standard protocol - 30 mL/kg per current or ideal body weight  IV antibiotics as appropriate for source of sepsis IV Zosyn  Reassessment: I have reassessed the patient's hemodynamic status  IR consulted for aspiration of fluid for culture/cells. Hold Lovenox. Restart IV fluids.\"    Discussed with Dr. Maldonado  IR aspiration failed  8/26. Rety IR aspiration at 3  Surgery is an option and she is open to this now  Continue antibiotics.      Hyponatremia- (present on admission)  Assessment & Plan  hypovolemic    Hepatosplenomegaly- (present on admission)  Assessment & Plan  Noted on CT scan    Generalized abdominal pain- (present on admission)  Assessment & Plan  Initially diffuse now left lower quadrant.  Increase the frequency of dilaudid to hourly while NPO       VTE prophylaxis: SCDs/TEDs    I have performed a physical exam and reviewed and updated ROS and Plan today (8/27/2022). In review of yesterday's note (8/26/2022), there are no changes except as documented above.        "

## 2022-08-28 LAB
ALBUMIN SERPL BCP-MCNC: 2.5 G/DL (ref 3.2–4.9)
ALBUMIN/GLOB SERPL: 0.9 G/DL
ALP SERPL-CCNC: 60 U/L (ref 30–99)
ALT SERPL-CCNC: 7 U/L (ref 2–50)
ANION GAP SERPL CALC-SCNC: 11 MMOL/L (ref 7–16)
AST SERPL-CCNC: 14 U/L (ref 12–45)
BILIRUB SERPL-MCNC: 0.4 MG/DL (ref 0.1–1.5)
BUN SERPL-MCNC: 7 MG/DL (ref 8–22)
CALCIUM SERPL-MCNC: 7.8 MG/DL (ref 8.5–10.5)
CHLORIDE SERPL-SCNC: 96 MMOL/L (ref 96–112)
CO2 SERPL-SCNC: 23 MMOL/L (ref 20–33)
CREAT SERPL-MCNC: 0.54 MG/DL (ref 0.5–1.4)
ERYTHROCYTE [DISTWIDTH] IN BLOOD BY AUTOMATED COUNT: 45.3 FL (ref 35.9–50)
ERYTHROCYTE [DISTWIDTH] IN BLOOD BY AUTOMATED COUNT: 46.3 FL (ref 35.9–50)
GFR SERPLBLD CREATININE-BSD FMLA CKD-EPI: 127 ML/MIN/1.73 M 2
GLOBULIN SER CALC-MCNC: 2.9 G/DL (ref 1.9–3.5)
GLUCOSE SERPL-MCNC: 109 MG/DL (ref 65–99)
HCT VFR BLD AUTO: 33.8 % (ref 37–47)
HCT VFR BLD AUTO: 35.5 % (ref 37–47)
HGB BLD-MCNC: 11.1 G/DL (ref 12–16)
HGB BLD-MCNC: 12 G/DL (ref 12–16)
MCH RBC QN AUTO: 27.1 PG (ref 27–33)
MCH RBC QN AUTO: 27.6 PG (ref 27–33)
MCHC RBC AUTO-ENTMCNC: 32.8 G/DL (ref 33.6–35)
MCHC RBC AUTO-ENTMCNC: 33.8 G/DL (ref 33.6–35)
MCV RBC AUTO: 81.6 FL (ref 81.4–97.8)
MCV RBC AUTO: 82.6 FL (ref 81.4–97.8)
PHOSPHATE SERPL-MCNC: 2.9 MG/DL (ref 2.5–4.5)
PLATELET # BLD AUTO: 335 K/UL (ref 164–446)
PLATELET # BLD AUTO: 346 K/UL (ref 164–446)
PMV BLD AUTO: 8.4 FL (ref 9–12.9)
PMV BLD AUTO: 8.4 FL (ref 9–12.9)
POTASSIUM SERPL-SCNC: 3.5 MMOL/L (ref 3.6–5.5)
PROT SERPL-MCNC: 5.4 G/DL (ref 6–8.2)
RBC # BLD AUTO: 4.09 M/UL (ref 4.2–5.4)
RBC # BLD AUTO: 4.35 M/UL (ref 4.2–5.4)
SODIUM SERPL-SCNC: 130 MMOL/L (ref 135–145)
WBC # BLD AUTO: 21.6 K/UL (ref 4.8–10.8)
WBC # BLD AUTO: 22.5 K/UL (ref 4.8–10.8)

## 2022-08-28 PROCEDURE — A9270 NON-COVERED ITEM OR SERVICE: HCPCS | Performed by: INTERNAL MEDICINE

## 2022-08-28 PROCEDURE — 36415 COLL VENOUS BLD VENIPUNCTURE: CPT

## 2022-08-28 PROCEDURE — 80053 COMPREHEN METABOLIC PANEL: CPT

## 2022-08-28 PROCEDURE — 84100 ASSAY OF PHOSPHORUS: CPT

## 2022-08-28 PROCEDURE — 700111 HCHG RX REV CODE 636 W/ 250 OVERRIDE (IP): Performed by: INTERNAL MEDICINE

## 2022-08-28 PROCEDURE — 700105 HCHG RX REV CODE 258: Performed by: HOSPITALIST

## 2022-08-28 PROCEDURE — 700111 HCHG RX REV CODE 636 W/ 250 OVERRIDE (IP): Performed by: HOSPITALIST

## 2022-08-28 PROCEDURE — A9270 NON-COVERED ITEM OR SERVICE: HCPCS | Performed by: HOSPITALIST

## 2022-08-28 PROCEDURE — 85027 COMPLETE CBC AUTOMATED: CPT | Mod: 91

## 2022-08-28 PROCEDURE — A9270 NON-COVERED ITEM OR SERVICE: HCPCS

## 2022-08-28 PROCEDURE — 770001 HCHG ROOM/CARE - MED/SURG/GYN PRIV*

## 2022-08-28 PROCEDURE — 700102 HCHG RX REV CODE 250 W/ 637 OVERRIDE(OP)

## 2022-08-28 PROCEDURE — 700102 HCHG RX REV CODE 250 W/ 637 OVERRIDE(OP): Performed by: INTERNAL MEDICINE

## 2022-08-28 PROCEDURE — 700105 HCHG RX REV CODE 258: Performed by: INTERNAL MEDICINE

## 2022-08-28 PROCEDURE — 99233 SBSQ HOSP IP/OBS HIGH 50: CPT | Performed by: INTERNAL MEDICINE

## 2022-08-28 PROCEDURE — 700102 HCHG RX REV CODE 250 W/ 637 OVERRIDE(OP): Performed by: HOSPITALIST

## 2022-08-28 RX ORDER — SIMETHICONE 125 MG
125 TABLET,CHEWABLE ORAL 3 TIMES DAILY PRN
Status: DISCONTINUED | OUTPATIENT
Start: 2022-08-28 | End: 2022-09-01

## 2022-08-28 RX ORDER — LACTOBACILLUS RHAMNOSUS GG 10B CELL
1 CAPSULE ORAL
Status: DISCONTINUED | OUTPATIENT
Start: 2022-08-29 | End: 2022-08-30

## 2022-08-28 RX ORDER — ASCORBIC ACID 500 MG
500 TABLET ORAL 2 TIMES DAILY
Status: DISCONTINUED | OUTPATIENT
Start: 2022-08-28 | End: 2022-09-01

## 2022-08-28 RX ORDER — M-VIT,TX,IRON,MINS/CALC/FOLIC 27MG-0.4MG
1 TABLET ORAL DAILY
Status: DISCONTINUED | OUTPATIENT
Start: 2022-08-28 | End: 2022-09-01

## 2022-08-28 RX ORDER — NORETHINDRONE ACETATE AND ETHINYL ESTRADIOL .02; 1 MG/1; MG/1
1 TABLET ORAL NIGHTLY
Status: ON HOLD | COMMUNITY
End: 2022-09-01

## 2022-08-28 RX ADMIN — SODIUM CHLORIDE, POTASSIUM CHLORIDE, SODIUM LACTATE AND CALCIUM CHLORIDE: 600; 310; 30; 20 INJECTION, SOLUTION INTRAVENOUS at 10:22

## 2022-08-28 RX ADMIN — OXYCODONE HYDROCHLORIDE 10 MG: 10 TABLET ORAL at 17:20

## 2022-08-28 RX ADMIN — OXYCODONE HYDROCHLORIDE AND ACETAMINOPHEN 500 MG: 500 TABLET ORAL at 10:21

## 2022-08-28 RX ADMIN — PIPERACILLIN SODIUM AND TAZOBACTAM SODIUM 3.38 G: 3; .375 INJECTION, POWDER, FOR SOLUTION INTRAVENOUS at 02:21

## 2022-08-28 RX ADMIN — ONDANSETRON 4 MG: 2 INJECTION INTRAMUSCULAR; INTRAVENOUS at 20:21

## 2022-08-28 RX ADMIN — OXYCODONE HYDROCHLORIDE 10 MG: 10 TABLET ORAL at 00:40

## 2022-08-28 RX ADMIN — DOCUSATE SODIUM 50 MG AND SENNOSIDES 8.6 MG 2 TABLET: 8.6; 5 TABLET, FILM COATED ORAL at 04:56

## 2022-08-28 RX ADMIN — OXYCODONE HYDROCHLORIDE 10 MG: 10 TABLET ORAL at 23:25

## 2022-08-28 RX ADMIN — OXYCODONE HYDROCHLORIDE AND ACETAMINOPHEN 500 MG: 500 TABLET ORAL at 17:20

## 2022-08-28 RX ADMIN — OXYCODONE HYDROCHLORIDE 10 MG: 10 TABLET ORAL at 06:02

## 2022-08-28 RX ADMIN — MULTIPLE VITAMINS W/ MINERALS TAB 1 TABLET: TAB at 10:21

## 2022-08-28 RX ADMIN — SIMETHICONE 125 MG: 125 TABLET, CHEWABLE ORAL at 14:05

## 2022-08-28 RX ADMIN — OXYCODONE HYDROCHLORIDE 10 MG: 10 TABLET ORAL at 13:40

## 2022-08-28 RX ADMIN — PIPERACILLIN SODIUM AND TAZOBACTAM SODIUM 3.38 G: 3; .375 INJECTION, POWDER, FOR SOLUTION INTRAVENOUS at 09:12

## 2022-08-28 RX ADMIN — ONDANSETRON 4 MG: 2 INJECTION INTRAMUSCULAR; INTRAVENOUS at 06:09

## 2022-08-28 RX ADMIN — SIMETHICONE 125 MG: 125 TABLET, CHEWABLE ORAL at 00:08

## 2022-08-28 RX ADMIN — ONDANSETRON 4 MG: 2 INJECTION INTRAMUSCULAR; INTRAVENOUS at 10:33

## 2022-08-28 RX ADMIN — HYDROMORPHONE HYDROCHLORIDE 0.5 MG: 1 INJECTION, SOLUTION INTRAMUSCULAR; INTRAVENOUS; SUBCUTANEOUS at 02:21

## 2022-08-28 RX ADMIN — PIPERACILLIN SODIUM AND TAZOBACTAM SODIUM 3.38 G: 3; .375 INJECTION, POWDER, FOR SOLUTION INTRAVENOUS at 17:20

## 2022-08-28 RX ADMIN — SIMETHICONE 125 MG: 125 TABLET, CHEWABLE ORAL at 23:24

## 2022-08-28 RX ADMIN — OXYCODONE HYDROCHLORIDE 10 MG: 10 TABLET ORAL at 09:11

## 2022-08-28 ASSESSMENT — ENCOUNTER SYMPTOMS
HEMOPTYSIS: 0
ABDOMINAL PAIN: 1
FALLS: 0
EYE REDNESS: 0
NAUSEA: 0
TREMORS: 0
CHILLS: 1
CONSTIPATION: 0
PALPITATIONS: 0
LOSS OF CONSCIOUSNESS: 0
BLOOD IN STOOL: 0
NERVOUS/ANXIOUS: 0
HEADACHES: 0
FEVER: 1
FOCAL WEAKNESS: 0
SEIZURES: 0
COUGH: 0
WHEEZING: 0
DIARRHEA: 0
WEAKNESS: 0
INSOMNIA: 0
MYALGIAS: 1
SHORTNESS OF BREATH: 0
VOMITING: 0
DIZZINESS: 0
EYE PAIN: 0

## 2022-08-28 ASSESSMENT — PAIN DESCRIPTION - PAIN TYPE
TYPE: ACUTE PAIN

## 2022-08-28 NOTE — PROGRESS NOTES
[facility] Medicine Daily Progress Note    Date of Service  8/28/2022    Chief Complaint  Brunilda Webber is a 32 y.o. female admitted 8/22/2022 with Abdominal Pain (Pt states she's been having abdominal pain since 8/17/2022. Pain has been progressively getting worse since then.), Vaginal Bleeding (Bleeding since Saturday, unsure if this is her menstrual cycle or not.  ), Fever (Pt states she had a fever of 100.8 at home for which she took ibuprofen. ), and N/V      Hospital Course  No notes on file  32 y.o. female who presented 8/22/2022 with abdominal pain and fever. Ms. Webber has previously known medical conditions that had an undergone in vitro fertilization and developed abdominal pain July 27 was found to have both a tubal and intrauterine pregnancy and underwent salpingectomy.  Intraoperatively she was found to have acute appendicitis therefore she also underwent appendectomy during that surgery.  Subsequently the intrauterine pregnancy was found to not be viable therefore underwent dilation and curettage on 8/6/2022.  On around 8/16/2022 she developed body aches, chills, fatigue with worsening abdominal pain.  Presented to urgent care where a COVID swab was negative and she was referred to the emergency room she also reported vaginal bleeding.  She underwent CAT scan of the abdomen pelvis in the emergency room revealing small to moderate ascites as well as hepatosplenomegaly.  She had a normal white count though elevated lactic acid at 3.3.  She did have a positive urinalysis though no urinary symptoms.  Given her hypotension that was refractory to IV fluids, she was admitted to the intensive care unit with a working diagnosis of septic shock.  Gynecology and surgery were consulted.     8/23/2022: Patient seen and evaluated in the ICU.  He continues to have significant abdominal pain now is more left-sided in nature whereas yesterday it was diffuse.  She is being scheduled for IR for drainage of the ascitic  "fluid.  She remains on IV Zosyn.  Cultures remain pending.  IV fluids have been restarted as she is NPO.\"  Dr. Austin    Interval Problem Update  8/24. Discussed with prior attending. No enough fluid to do a diagnostic tap. Blood culture so far negative. Discussed with Dr. Maldonado and if can;t tap will keep IV Abx for 5 days then reimage  UPDATE: After further review, IR was contacted and will try to get diagnostic aspiration of pelvic abscess tomorrow.  8/25. IR unable to do the aspiration. Patient could not tolerate lying on stomach.   May need to repeat CT tomorrow instead.  8/26. After further clarification, she is opting to do IR as long as IR will explain the process. I explained that she may get a drain if there is enough fluid to drain.  8/27. S/p drain. Minimal serosanguinous output. Surgery following.   8/28. E. Coli pelvic abscess, sensitivities pending. Patient and /partner at bedside. Leukocytosis climbing.    I have discussed this patient's plan of care and discharge plan at IDT rounds today with Case Management, Nursing, Nursing leadership, and other members of the IDT team.    Consultants/Specialty      Code Status  Full Code    Disposition  Patient is not medically cleared for discharge.   Anticipate discharge to TBD  I have placed the appropriate orders for post-discharge needs.    Review of Systems  Review of Systems   Constitutional:  Positive for chills and fever.   HENT:  Negative for congestion, hearing loss and nosebleeds.    Eyes:  Negative for pain and redness.   Respiratory:  Negative for cough, hemoptysis, shortness of breath and wheezing.    Cardiovascular:  Negative for chest pain and palpitations.   Gastrointestinal:  Positive for abdominal pain. Negative for blood in stool, constipation, diarrhea, nausea and vomiting.   Genitourinary:  Negative for dysuria, frequency and hematuria.   Musculoskeletal:  Positive for myalgias. Negative for falls and joint pain.   Skin:  Negative " for rash.   Neurological:  Negative for dizziness, tremors, focal weakness, seizures, loss of consciousness, weakness and headaches.   Psychiatric/Behavioral:  The patient is not nervous/anxious and does not have insomnia.    All other systems reviewed and are negative.     Physical Exam  Temp:  [36.2 °C (97.1 °F)-36.5 °C (97.7 °F)] 36.5 °C (97.7 °F)  Pulse:  [69-98] 98  Resp:  [15-20] 15  BP: (103-116)/(62-69) 103/68  SpO2:  [97 %] 97 %    Physical Exam  Vitals and nursing note reviewed.   Constitutional:       General: She is not in acute distress.     Comments: Malaised   HENT:      Head: Normocephalic and atraumatic.      Right Ear: External ear normal.      Left Ear: External ear normal.      Nose: Nose normal.      Mouth/Throat:      Mouth: Mucous membranes are moist.   Eyes:      General: No scleral icterus.     Conjunctiva/sclera: Conjunctivae normal.   Cardiovascular:      Rate and Rhythm: Normal rate and regular rhythm.      Pulses: Normal pulses.      Heart sounds: No murmur heard.    No friction rub. No gallop.   Pulmonary:      Effort: Pulmonary effort is normal. No respiratory distress.      Breath sounds: Normal breath sounds. No stridor. No wheezing, rhonchi or rales.   Chest:      Chest wall: No tenderness.   Abdominal:      General: Abdomen is flat. Bowel sounds are normal. There is no distension.      Palpations: Abdomen is soft.      Tenderness: There is abdominal tenderness. There is no guarding or rebound.      Comments: Drain noted. 20-30mL serosanguinous drainage   Musculoskeletal:         General: No swelling, tenderness or deformity. Normal range of motion.      Cervical back: Normal range of motion and neck supple. No rigidity.   Skin:     General: Skin is warm.      Coloration: Skin is not jaundiced.   Neurological:      General: No focal deficit present.      Mental Status: She is alert and oriented to person, place, and time. Mental status is at baseline.      Motor: Weakness present.    Psychiatric:         Mood and Affect: Mood normal.         Behavior: Behavior normal.         Thought Content: Thought content normal.         Judgment: Judgment normal.      Comments: Somewhat anxious       Fluids    Intake/Output Summary (Last 24 hours) at 8/28/2022 0908  Last data filed at 8/28/2022 0040  Gross per 24 hour   Intake 960 ml   Output 10 ml   Net 950 ml         Laboratory  Recent Labs     08/26/22  1004 08/27/22  0710   WBC 16.4* 18.1*   RBC 3.89* 3.75*   HEMOGLOBIN 10.7* 10.2*   HEMATOCRIT 31.5* 30.4*   MCV 81.0* 81.1*   MCH 27.5 27.2   MCHC 34.0 33.6   RDW 43.8 44.4   PLATELETCT 293 297   MPV 8.4* 8.5*       Recent Labs     08/26/22  1004 08/27/22  0710   SODIUM 133* 134*   POTASSIUM 3.4* 3.9   CHLORIDE 96 98   CO2 25 25   GLUCOSE 98 109*   BUN 4* 6*   CREATININE 0.54 0.45*   CALCIUM 8.2* 8.1*                       Imaging  IR-US GUIDED PIV   Final Result    Ultrasound-guided PERIPHERAL IV INSERTION performed by    qualified nursing staff as above.      CT-DRAIN-PERITONEAL   Final Result      1.  CT guided pelvic abscess catheter drainage.   2.  The current plan is to obtain a follow-up CT in 5-7 days..      US-EXTREMITY VENOUS LOWER UNILAT LEFT   Final Result      DX-CHEST-PORTABLE (1 VIEW)   Final Result         1.  Left lower lobe infiltrate.   2.  Trace left pleural effusion      US-ABDOMEN LTD (SOFT TISSUE)   Final Result      Mild four-quadrant free fluid.      Loculated rim-enhancing fluid collection is suggested within the pelvis/cul-de-sac on the CT, possible abscess.      US-PELVIC TRANSABDOMINAL ONLY   Final Result      1.  Limited transabdominal study due to pain as well as patient refusing endovaginal exam.   2.  Note definite uterine abnormality seen.   3.  Ovaries are not visualized, obscured by bowel gas.      CT-ABDOMEN-PELVIS WITH   Final Result      1.  Hepatosplenomegaly.      2.  Small to moderate ascites throughout the abdomen and pelvis.      3.  Bilateral ovarian cysts,  "right greater than left.      DX-CHEST-PORTABLE (1 VIEW)   Final Result      No acute cardiac or pulmonary abnormalities are identified.             Assessment/Plan  * Septic shock, ascites, possible pelvic abscess (HCC)- (present on admission)  Assessment & Plan  This is Septic shock Present on admission  SIRS criteria identified on my evaluation include: Fever, with temperature greater than 101 deg F  Indicators of septic shock include: Severe sepsis present and persistent hypotension after 30 ml/kg completed.   Sources is: abdominal source  Sepsis protocol initiated  Crystalloid Fluid Administration: Fluid resuscitation ordered per standard protocol - 30 mL/kg per current or ideal body weight  IV antibiotics as appropriate for source of sepsis IV Zosyn  Reassessment: I have reassessed the patient's hemodynamic status  IR consulted for aspiration of fluid for culture/cells. Hold Lovenox. Restart IV fluids.\"    Discussed with Dr. Maldonado  IR aspiration failed  8/26. Rety IR aspiration at 3  Surgery is an option and she is open to this now  Continue antibiotics.  8/27. S/p IR aspiration and drain  Await cultures and sensitivities.  Surgery following drain  Leukocytosis 18-16-18K  Leukocytosis increased to 22K  Await sensitivities      Contraception management  Assessment & Plan  Noted  Hold off for now, lower risk of thrombiosis. Patient ok with that.    Hyponatremia- (present on admission)  Assessment & Plan  hypovolemic    Hepatosplenomegaly- (present on admission)  Assessment & Plan  Noted on CT scan    Generalized abdominal pain- (present on admission)  Assessment & Plan  Initially diffuse now left lower quadrant.  Increase the frequency of dilaudid to hourly while NPO       VTE prophylaxis: SCDs/TEDs    I have performed a physical exam and reviewed and updated ROS and Plan today (8/28/2022). In review of yesterday's note (8/27/2022), there are no changes except as documented above.        "

## 2022-08-28 NOTE — PROGRESS NOTES
"  DATE: 8/28/2022    Hospital Day 7 abdominal pain.    PHYSICAL EXAMINATION:  Vital Signs: /62   Pulse 92   Temp 36.5 °C (97.7 °F) (Temporal)   Resp 15   Ht 1.702 m (5' 7\")   Wt 88.5 kg (195 lb 1.7 oz)   SpO2 97%     LABORATORY VALUES:   Recent Labs     08/26/22  1004 08/27/22  0710   WBC 16.4* 18.1*   RBC 3.89* 3.75*   HEMOGLOBIN 10.7* 10.2*   HEMATOCRIT 31.5* 30.4*   MCV 81.0* 81.1*   MCH 27.5 27.2   MCHC 34.0 33.6   RDW 43.8 44.4   PLATELETCT 293 297   MPV 8.4* 8.5*       ASSESSMENT AND PLAN:     Stable following percutaneous drainage of pelvic fluid collection.   Preliminary cultures remarkable for E. coli.  Definitive susceptibilities pending.   Continue broad-spectrum antibiotic therapy with de-escalation predicated on sensitivities.   Acute Care Surgery will continue to follow.       ____________________________________     Ricky Dawkins M.D.    DD: 8/28/2022  7:56 AM    "

## 2022-08-29 ENCOUNTER — APPOINTMENT (OUTPATIENT)
Dept: RADIOLOGY | Facility: MEDICAL CENTER | Age: 32
DRG: 871 | End: 2022-08-29
Attending: INTERNAL MEDICINE
Payer: COMMERCIAL

## 2022-08-29 PROBLEM — K56.7 ILEUS (HCC): Status: ACTIVE | Noted: 2022-08-29

## 2022-08-29 LAB
ALBUMIN SERPL BCP-MCNC: 2.3 G/DL (ref 3.2–4.9)
ALBUMIN/GLOB SERPL: 0.9 G/DL
ALP SERPL-CCNC: 51 U/L (ref 30–99)
ALT SERPL-CCNC: 6 U/L (ref 2–50)
ANION GAP SERPL CALC-SCNC: 9 MMOL/L (ref 7–16)
AST SERPL-CCNC: 12 U/L (ref 12–45)
BILIRUB SERPL-MCNC: 0.3 MG/DL (ref 0.1–1.5)
BUN SERPL-MCNC: 4 MG/DL (ref 8–22)
CALCIUM SERPL-MCNC: 7.9 MG/DL (ref 8.5–10.5)
CHLORIDE SERPL-SCNC: 103 MMOL/L (ref 96–112)
CO2 SERPL-SCNC: 27 MMOL/L (ref 20–33)
CREAT SERPL-MCNC: 0.5 MG/DL (ref 0.5–1.4)
ERYTHROCYTE [DISTWIDTH] IN BLOOD BY AUTOMATED COUNT: 46 FL (ref 35.9–50)
GFR SERPLBLD CREATININE-BSD FMLA CKD-EPI: 129 ML/MIN/1.73 M 2
GLOBULIN SER CALC-MCNC: 2.7 G/DL (ref 1.9–3.5)
GLUCOSE SERPL-MCNC: 90 MG/DL (ref 65–99)
HCT VFR BLD AUTO: 29.8 % (ref 37–47)
HGB BLD-MCNC: 9.9 G/DL (ref 12–16)
MCH RBC QN AUTO: 27.1 PG (ref 27–33)
MCHC RBC AUTO-ENTMCNC: 33.2 G/DL (ref 33.6–35)
MCV RBC AUTO: 81.6 FL (ref 81.4–97.8)
PHOSPHATE SERPL-MCNC: 3.9 MG/DL (ref 2.5–4.5)
PLATELET # BLD AUTO: 301 K/UL (ref 164–446)
PMV BLD AUTO: 8.5 FL (ref 9–12.9)
POTASSIUM SERPL-SCNC: 3.6 MMOL/L (ref 3.6–5.5)
PROT SERPL-MCNC: 5 G/DL (ref 6–8.2)
RBC # BLD AUTO: 3.65 M/UL (ref 4.2–5.4)
SODIUM SERPL-SCNC: 139 MMOL/L (ref 135–145)
WBC # BLD AUTO: 16.3 K/UL (ref 4.8–10.8)

## 2022-08-29 PROCEDURE — A9270 NON-COVERED ITEM OR SERVICE: HCPCS

## 2022-08-29 PROCEDURE — 80053 COMPREHEN METABOLIC PANEL: CPT

## 2022-08-29 PROCEDURE — A9270 NON-COVERED ITEM OR SERVICE: HCPCS | Performed by: HOSPITALIST

## 2022-08-29 PROCEDURE — 700111 HCHG RX REV CODE 636 W/ 250 OVERRIDE (IP): Performed by: INTERNAL MEDICINE

## 2022-08-29 PROCEDURE — 700102 HCHG RX REV CODE 250 W/ 637 OVERRIDE(OP): Performed by: INTERNAL MEDICINE

## 2022-08-29 PROCEDURE — 74018 RADEX ABDOMEN 1 VIEW: CPT

## 2022-08-29 PROCEDURE — A9270 NON-COVERED ITEM OR SERVICE: HCPCS | Performed by: INTERNAL MEDICINE

## 2022-08-29 PROCEDURE — 700102 HCHG RX REV CODE 250 W/ 637 OVERRIDE(OP)

## 2022-08-29 PROCEDURE — 36415 COLL VENOUS BLD VENIPUNCTURE: CPT

## 2022-08-29 PROCEDURE — 700105 HCHG RX REV CODE 258: Performed by: INTERNAL MEDICINE

## 2022-08-29 PROCEDURE — 99233 SBSQ HOSP IP/OBS HIGH 50: CPT | Performed by: INTERNAL MEDICINE

## 2022-08-29 PROCEDURE — 85027 COMPLETE CBC AUTOMATED: CPT

## 2022-08-29 PROCEDURE — 770001 HCHG ROOM/CARE - MED/SURG/GYN PRIV*

## 2022-08-29 PROCEDURE — 99233 SBSQ HOSP IP/OBS HIGH 50: CPT | Performed by: SURGERY

## 2022-08-29 PROCEDURE — 700102 HCHG RX REV CODE 250 W/ 637 OVERRIDE(OP): Performed by: HOSPITALIST

## 2022-08-29 PROCEDURE — 700105 HCHG RX REV CODE 258: Performed by: HOSPITALIST

## 2022-08-29 PROCEDURE — 84100 ASSAY OF PHOSPHORUS: CPT

## 2022-08-29 RX ORDER — SULFAMETHOXAZOLE AND TRIMETHOPRIM 800; 160 MG/1; MG/1
1 TABLET ORAL EVERY 12 HOURS
Status: DISCONTINUED | OUTPATIENT
Start: 2022-08-29 | End: 2022-08-29

## 2022-08-29 RX ORDER — SULFAMETHOXAZOLE AND TRIMETHOPRIM 800; 160 MG/1; MG/1
1 TABLET ORAL EVERY 12 HOURS
Status: DISCONTINUED | OUTPATIENT
Start: 2022-08-29 | End: 2022-08-31

## 2022-08-29 RX ORDER — FAMOTIDINE 20 MG/1
20 TABLET, FILM COATED ORAL 2 TIMES DAILY
Status: DISCONTINUED | OUTPATIENT
Start: 2022-08-29 | End: 2022-09-01 | Stop reason: HOSPADM

## 2022-08-29 RX ADMIN — ACETAMINOPHEN 650 MG: 325 TABLET ORAL at 18:45

## 2022-08-29 RX ADMIN — OXYCODONE HYDROCHLORIDE 10 MG: 10 TABLET ORAL at 02:25

## 2022-08-29 RX ADMIN — PIPERACILLIN SODIUM AND TAZOBACTAM SODIUM 3.38 G: 3; .375 INJECTION, POWDER, FOR SOLUTION INTRAVENOUS at 02:27

## 2022-08-29 RX ADMIN — PIPERACILLIN SODIUM AND TAZOBACTAM SODIUM 3.38 G: 3; .375 INJECTION, POWDER, FOR SOLUTION INTRAVENOUS at 10:11

## 2022-08-29 RX ADMIN — SIMETHICONE 125 MG: 125 TABLET, CHEWABLE ORAL at 21:01

## 2022-08-29 RX ADMIN — ACETAMINOPHEN 650 MG: 325 TABLET ORAL at 03:05

## 2022-08-29 RX ADMIN — OXYCODONE HYDROCHLORIDE AND ACETAMINOPHEN 500 MG: 500 TABLET ORAL at 05:49

## 2022-08-29 RX ADMIN — MULTIPLE VITAMINS W/ MINERALS TAB 1 TABLET: TAB at 05:49

## 2022-08-29 RX ADMIN — SIMETHICONE 125 MG: 125 TABLET, CHEWABLE ORAL at 03:05

## 2022-08-29 RX ADMIN — SULFAMETHOXAZOLE AND TRIMETHOPRIM 1 TABLET: 800; 160 TABLET ORAL at 17:49

## 2022-08-29 RX ADMIN — ONDANSETRON 4 MG: 2 INJECTION INTRAMUSCULAR; INTRAVENOUS at 18:48

## 2022-08-29 RX ADMIN — FAMOTIDINE 20 MG: 20 TABLET, FILM COATED ORAL at 17:49

## 2022-08-29 RX ADMIN — LIDOCAINE HYDROCHLORIDE 15 ML: 20 SOLUTION OROPHARYNGEAL at 21:01

## 2022-08-29 RX ADMIN — DOCUSATE SODIUM 50 MG AND SENNOSIDES 8.6 MG 2 TABLET: 8.6; 5 TABLET, FILM COATED ORAL at 05:49

## 2022-08-29 RX ADMIN — LIDOCAINE HYDROCHLORIDE 15 ML: 20 SOLUTION OROPHARYNGEAL at 10:38

## 2022-08-29 RX ADMIN — OXYCODONE HYDROCHLORIDE 10 MG: 10 TABLET ORAL at 10:10

## 2022-08-29 RX ADMIN — SODIUM CHLORIDE, POTASSIUM CHLORIDE, SODIUM LACTATE AND CALCIUM CHLORIDE: 600; 310; 30; 20 INJECTION, SOLUTION INTRAVENOUS at 22:05

## 2022-08-29 RX ADMIN — OXYCODONE HYDROCHLORIDE 10 MG: 10 TABLET ORAL at 05:49

## 2022-08-29 RX ADMIN — ACETAMINOPHEN 650 MG: 325 TABLET ORAL at 12:53

## 2022-08-29 RX ADMIN — OXYCODONE HYDROCHLORIDE AND ACETAMINOPHEN 500 MG: 500 TABLET ORAL at 17:49

## 2022-08-29 ASSESSMENT — PAIN DESCRIPTION - PAIN TYPE
TYPE: ACUTE PAIN
TYPE: ACUTE PAIN

## 2022-08-29 ASSESSMENT — ENCOUNTER SYMPTOMS
PALPITATIONS: 0
FEVER: 0
DIARRHEA: 0
WEAKNESS: 0
LOSS OF CONSCIOUSNESS: 0
FEVER: 1
NERVOUS/ANXIOUS: 0
WHEEZING: 0
VOMITING: 0
CONSTIPATION: 0
NAUSEA: 0
BLOOD IN STOOL: 0
COUGH: 0
FOCAL WEAKNESS: 0
MYALGIAS: 1
DIZZINESS: 0
EYE PAIN: 0
FALLS: 0
CHILLS: 0
EYE REDNESS: 0
CHILLS: 1
HEADACHES: 0
TREMORS: 0
INSOMNIA: 0
HEMOPTYSIS: 0
ABDOMINAL PAIN: 1
SHORTNESS OF BREATH: 0
SEIZURES: 0

## 2022-08-29 NOTE — PROGRESS NOTES
Assumed care of patient at 0700 from Shelley CHOW. Patient is A&O x4, states pain level is 8/10. Bed locked in lowest position with 2 rail up. Call light  in place, belongings at bedside. Hourly rounding is in place.

## 2022-08-29 NOTE — PROGRESS NOTES
"  DATE: 2022    Hospital Day 8 abdominal pain.    PHYSICAL EXAMINATION:  Vital Signs: /63   Pulse 67   Temp 36.5 °C (97.7 °F) (Temporal)   Resp 16   Ht 1.702 m (5' 7\")   Wt 88.5 kg (195 lb 1.7 oz)   SpO2 95%     LABORATORY VALUES:   Recent Labs     22  1000 22  0533   WBC 22.5* 21.6* 16.3*   RBC 4.35 4.09* 3.65*   HEMOGLOBIN 12.0 11.1* 9.9*   HEMATOCRIT 35.5* 33.8* 29.8*   MCV 81.6 82.6 81.6   MCH 27.6 27.1 27.1   MCHC 33.8 32.8* 33.2*   RDW 45.3 46.3 46.0   PLATELETCT 335 346 301   MPV 8.4* 8.4* 8.5*         DD: 2022  7:56 AM  Surgical Progress Note    Author: Atilio Hutchins M.D. Date & Time created: 2022   11:55 AM     Interval Events:  Drain volume has decreased significantly no longer purulent  Patient reports initially was quite purulent abdomen remains distended but is improving  White count is trending down  Afebrile  Positive clinical response    Review of Systems   Constitutional:  Negative for chills and fever.   Gastrointestinal:  Positive for abdominal pain.   Hemodynamics:  Temp (24hrs), Av.3 °C (97.3 °F), Min:36.1 °C (97 °F), Max:36.5 °C (97.7 °F)  Temperature: 36.5 °C (97.7 °F)  Pulse  Av  Min: 67  Max: 146   Blood Pressure: 105/63     Respiratory:    Respiration: 16, Pulse Oximetry: 95 %        RUL Breath Sounds: Clear, RML Breath Sounds: Diminished, RLL Breath Sounds: Diminished, MALU Breath Sounds: Clear, LLL Breath Sounds: Diminished  Neuro:  GCS       Fluids:    Intake/Output Summary (Last 24 hours) at 2022 1155  Last data filed at 2022 0800  Gross per 24 hour   Intake 480 ml   Output 15 ml   Net 465 ml        Current Diet Order   Procedures    Diet Order Diet: Regular     Physical Exam  HENT:      Head: Normocephalic.   Cardiovascular:      Rate and Rhythm: Normal rate and regular rhythm.   Pulmonary:      Effort: Pulmonary effort is normal.      Breath sounds: Normal breath sounds.   Abdominal:      General: There is " distension.      Tenderness: There is abdominal tenderness.   Musculoskeletal:         General: Swelling present.   Skin:     Capillary Refill: Capillary refill takes less than 2 seconds.   Neurological:      General: No focal deficit present.      Mental Status: She is alert.   Psychiatric:         Mood and Affect: Mood normal.     Labs:  Recent Results (from the past 24 hour(s))   CBC WITHOUT DIFFERENTIAL    Collection Time: 08/28/22  8:13 PM   Result Value Ref Range    WBC 21.6 (H) 4.8 - 10.8 K/uL    RBC 4.09 (L) 4.20 - 5.40 M/uL    Hemoglobin 11.1 (L) 12.0 - 16.0 g/dL    Hematocrit 33.8 (L) 37.0 - 47.0 %    MCV 82.6 81.4 - 97.8 fL    MCH 27.1 27.0 - 33.0 pg    MCHC 32.8 (L) 33.6 - 35.0 g/dL    RDW 46.3 35.9 - 50.0 fL    Platelet Count 346 164 - 446 K/uL    MPV 8.4 (L) 9.0 - 12.9 fL   CBC without Differential    Collection Time: 08/29/22  5:33 AM   Result Value Ref Range    WBC 16.3 (H) 4.8 - 10.8 K/uL    RBC 3.65 (L) 4.20 - 5.40 M/uL    Hemoglobin 9.9 (L) 12.0 - 16.0 g/dL    Hematocrit 29.8 (L) 37.0 - 47.0 %    MCV 81.6 81.4 - 97.8 fL    MCH 27.1 27.0 - 33.0 pg    MCHC 33.2 (L) 33.6 - 35.0 g/dL    RDW 46.0 35.9 - 50.0 fL    Platelet Count 301 164 - 446 K/uL    MPV 8.5 (L) 9.0 - 12.9 fL   Comp Metabolic Panel (CMP)    Collection Time: 08/29/22  5:33 AM   Result Value Ref Range    Sodium 139 135 - 145 mmol/L    Potassium 3.6 3.6 - 5.5 mmol/L    Chloride 103 96 - 112 mmol/L    Co2 27 20 - 33 mmol/L    Anion Gap 9.0 7.0 - 16.0    Glucose 90 65 - 99 mg/dL    Bun 4 (L) 8 - 22 mg/dL    Creatinine 0.50 0.50 - 1.40 mg/dL    Calcium 7.9 (L) 8.5 - 10.5 mg/dL    AST(SGOT) 12 12 - 45 U/L    ALT(SGPT) 6 2 - 50 U/L    Alkaline Phosphatase 51 30 - 99 U/L    Total Bilirubin 0.3 0.1 - 1.5 mg/dL    Albumin 2.3 (L) 3.2 - 4.9 g/dL    Total Protein 5.0 (L) 6.0 - 8.2 g/dL    Globulin 2.7 1.9 - 3.5 g/dL    A-G Ratio 0.9 g/dL   Renal Function Panel    Collection Time: 08/29/22  5:33 AM   Result Value Ref Range    Phosphorus 3.9 2.5 -  4.5 mg/dL   ESTIMATED GFR    Collection Time: 08/29/22  5:33 AM   Result Value Ref Range    GFR (CKD-EPI) 129 >60 mL/min/1.73 m 2     Medical Decision Making, by Problem:  Active Hospital Problems    Diagnosis     Generalized abdominal pain [R10.84]      Priority: High    Septic shock, ascites, possible pelvic abscess (HCC) [A41.9, R65.21]     Hepatosplenomegaly [R16.2]     Hyponatremia [E87.1]     Contraception management [Z30.9]      Plan:  Continue antibiotics and drain for now  If white count fails to normalize will consider follow-up CT scan    Quality Measures:  Quality-Core Measures    Discussed patient condition with Patient

## 2022-08-29 NOTE — PROGRESS NOTES
[facility] Medicine Daily Progress Note    Date of Service  8/29/2022    Chief Complaint  Brunilda Webber is a 32 y.o. female admitted 8/22/2022 with Abdominal Pain (Pt states she's been having abdominal pain since 8/17/2022. Pain has been progressively getting worse since then.), Vaginal Bleeding (Bleeding since Saturday, unsure if this is her menstrual cycle or not.  ), Fever (Pt states she had a fever of 100.8 at home for which she took ibuprofen. ), and N/V      Hospital Course  No notes on file  32 y.o. female who presented 8/22/2022 with abdominal pain and fever. Ms. Webber has previously known medical conditions that had an undergone in vitro fertilization and developed abdominal pain July 27 was found to have both a tubal and intrauterine pregnancy and underwent salpingectomy.  Intraoperatively she was found to have acute appendicitis therefore she also underwent appendectomy during that surgery.  Subsequently the intrauterine pregnancy was found to not be viable therefore underwent dilation and curettage on 8/6/2022.  On around 8/16/2022 she developed body aches, chills, fatigue with worsening abdominal pain.  Presented to urgent care where a COVID swab was negative and she was referred to the emergency room she also reported vaginal bleeding.  She underwent CAT scan of the abdomen pelvis in the emergency room revealing small to moderate ascites as well as hepatosplenomegaly.  She had a normal white count though elevated lactic acid at 3.3.  She did have a positive urinalysis though no urinary symptoms.  Given her hypotension that was refractory to IV fluids, she was admitted to the intensive care unit with a working diagnosis of septic shock.  Gynecology and surgery were consulted.     8/23/2022: Patient seen and evaluated in the ICU.  He continues to have significant abdominal pain now is more left-sided in nature whereas yesterday it was diffuse.  She is being scheduled for IR for drainage of the ascitic  "fluid.  She remains on IV Zosyn.  Cultures remain pending.  IV fluids have been restarted as she is NPO.\"  Dr. Austin    Interval Problem Update  8/24. Discussed with prior attending. No enough fluid to do a diagnostic tap. Blood culture so far negative. Discussed with Dr. Maldonado and if can;t tap will keep IV Abx for 5 days then reimage  UPDATE: After further review, IR was contacted and will try to get diagnostic aspiration of pelvic abscess tomorrow.  8/25. IR unable to do the aspiration. Patient could not tolerate lying on stomach.   May need to repeat CT tomorrow instead.  8/26. After further clarification, she is opting to do IR as long as IR will explain the process. I explained that she may get a drain if there is enough fluid to drain.  8/27. S/p drain. Minimal serosanguinous output. Surgery following.   8/28. E. Coli pelvic abscess, sensitivities pending. Patient and /partner at bedside. Leukocytosis climbing.  8/29. Pansensitive E. Coli., WBC went up to 22K now down to 16.3K. She still has abdominal distension but this is soft on exam with hypoactive bowel sounds.     I have discussed this patient's plan of care and discharge plan at IDT rounds today with Case Management, Nursing, Nursing leadership, and other members of the IDT team.    Consultants/Specialty      Code Status  Full Code    Disposition  Patient is not medically cleared for discharge.   Anticipate discharge to TBD  I have placed the appropriate orders for post-discharge needs.    Review of Systems  Review of Systems   Constitutional:  Positive for chills and fever.   HENT:  Negative for congestion, hearing loss and nosebleeds.    Eyes:  Negative for pain and redness.   Respiratory:  Negative for cough, hemoptysis, shortness of breath and wheezing.    Cardiovascular:  Negative for chest pain and palpitations.   Gastrointestinal:  Positive for abdominal pain. Negative for blood in stool, constipation, diarrhea, nausea and vomiting. "   Genitourinary:  Negative for dysuria, frequency and hematuria.   Musculoskeletal:  Positive for myalgias. Negative for falls and joint pain.   Skin:  Negative for rash.   Neurological:  Negative for dizziness, tremors, focal weakness, seizures, loss of consciousness, weakness and headaches.   Psychiatric/Behavioral:  The patient is not nervous/anxious and does not have insomnia.    All other systems reviewed and are negative.     Physical Exam  Temp:  [36.1 °C (97 °F)-36.5 °C (97.7 °F)] 36.5 °C (97.7 °F)  Pulse:  [67-98] 67  Resp:  [16-19] 16  BP: (103-122)/(56-68) 105/63  SpO2:  [94 %-97 %] 95 %    Physical Exam  Vitals and nursing note reviewed.   Constitutional:       General: She is not in acute distress.     Comments: Malaised   HENT:      Head: Normocephalic and atraumatic.      Right Ear: External ear normal.      Left Ear: External ear normal.      Nose: Nose normal.      Mouth/Throat:      Mouth: Mucous membranes are moist.   Eyes:      General: No scleral icterus.     Conjunctiva/sclera: Conjunctivae normal.   Cardiovascular:      Rate and Rhythm: Normal rate and regular rhythm.      Pulses: Normal pulses.      Heart sounds: No murmur heard.    No friction rub. No gallop.   Pulmonary:      Effort: Pulmonary effort is normal. No respiratory distress.      Breath sounds: Normal breath sounds. No stridor. No wheezing, rhonchi or rales.   Chest:      Chest wall: No tenderness.   Abdominal:      General: Abdomen is flat. Bowel sounds are normal. There is distension (mild).      Palpations: Abdomen is soft.      Tenderness: There is abdominal tenderness. There is no guarding or rebound.      Comments: Drain noted. 20-30mL serosanguinous drainage   Musculoskeletal:         General: No swelling, tenderness or deformity. Normal range of motion.      Cervical back: Normal range of motion and neck supple. No rigidity.   Skin:     General: Skin is warm.      Coloration: Skin is not jaundiced.   Neurological:       General: No focal deficit present.      Mental Status: She is alert and oriented to person, place, and time. Mental status is at baseline.      Motor: Weakness present.   Psychiatric:         Mood and Affect: Mood normal.         Behavior: Behavior normal.         Thought Content: Thought content normal.         Judgment: Judgment normal.      Comments: Somewhat anxious       Fluids    Intake/Output Summary (Last 24 hours) at 8/29/2022 0813  Last data filed at 8/28/2022 2045  Gross per 24 hour   Intake 480 ml   Output 1 ml   Net 479 ml         Laboratory  Recent Labs     08/28/22  1000 08/28/22 2013 08/29/22  0533   WBC 22.5* 21.6* 16.3*   RBC 4.35 4.09* 3.65*   HEMOGLOBIN 12.0 11.1* 9.9*   HEMATOCRIT 35.5* 33.8* 29.8*   MCV 81.6 82.6 81.6   MCH 27.6 27.1 27.1   MCHC 33.8 32.8* 33.2*   RDW 45.3 46.3 46.0   PLATELETCT 335 346 301   MPV 8.4* 8.4* 8.5*       Recent Labs     08/27/22  0710 08/28/22 1000 08/29/22  0533   SODIUM 134* 130* 139   POTASSIUM 3.9 3.5* 3.6   CHLORIDE 98 96 103   CO2 25 23 27   GLUCOSE 109* 109* 90   BUN 6* 7* 4*   CREATININE 0.45* 0.54 0.50   CALCIUM 8.1* 7.8* 7.9*                       Imaging  IR-US GUIDED PIV   Final Result    Ultrasound-guided PERIPHERAL IV INSERTION performed by    qualified nursing staff as above.      CT-DRAIN-PERITONEAL   Final Result      1.  CT guided pelvic abscess catheter drainage.   2.  The current plan is to obtain a follow-up CT in 5-7 days..      US-EXTREMITY VENOUS LOWER UNILAT LEFT   Final Result      DX-CHEST-PORTABLE (1 VIEW)   Final Result         1.  Left lower lobe infiltrate.   2.  Trace left pleural effusion      US-ABDOMEN LTD (SOFT TISSUE)   Final Result      Mild four-quadrant free fluid.      Loculated rim-enhancing fluid collection is suggested within the pelvis/cul-de-sac on the CT, possible abscess.      US-PELVIC TRANSABDOMINAL ONLY   Final Result      1.  Limited transabdominal study due to pain as well as patient refusing endovaginal exam.  "  2.  Note definite uterine abnormality seen.   3.  Ovaries are not visualized, obscured by bowel gas.      CT-ABDOMEN-PELVIS WITH   Final Result      1.  Hepatosplenomegaly.      2.  Small to moderate ascites throughout the abdomen and pelvis.      3.  Bilateral ovarian cysts, right greater than left.      DX-CHEST-PORTABLE (1 VIEW)   Final Result      No acute cardiac or pulmonary abnormalities are identified.             Assessment/Plan  * Septic shock, ascites, possible pelvic abscess (HCC)- (present on admission)  Assessment & Plan  This is Septic shock Present on admission  SIRS criteria identified on my evaluation include: Fever, with temperature greater than 101 deg F  Indicators of septic shock include: Severe sepsis present and persistent hypotension after 30 ml/kg completed.   Sources is: abdominal source  Sepsis protocol initiated  Crystalloid Fluid Administration: Fluid resuscitation ordered per standard protocol - 30 mL/kg per current or ideal body weight  IV antibiotics as appropriate for source of sepsis IV Zosyn  Reassessment: I have reassessed the patient's hemodynamic status  IR consulted for aspiration of fluid for culture/cells. Hold Lovenox. Restart IV fluids.\"    Discussed with Dr. Maldonado  IR aspiration failed  8/26. Rety IR aspiration at 3  Surgery is an option and she is open to this now  Continue antibiotics.  8/27. S/p IR aspiration and drain  Await cultures and sensitivities.  Surgery following drain  Leukocytosis 18-16-18K  Leukocytosis increased to 22K  Leukocytosis now downtrending  Pansensitive E. Coli on culture. Continue Abx,  Aderess ileus        Contraception management  Assessment & Plan  Noted  Hold off for now, lower risk of thrombiosis. Patient ok with that.    Hyponatremia- (present on admission)  Assessment & Plan  Hypovolemic\"    Resolved    Hepatosplenomegaly- (present on admission)  Assessment & Plan  Noted on CT scan    Generalized abdominal pain- (present on " "admission)  Assessment & Plan  Initially diffuse now left lower quadrant.  Increase the frequency of dilaudid to hourly while NPO\"    Ordered KUB. Ileus  Moving bowels unlikely SBO  Downgrade diet to clears, give PPI, antiemetics, bowel rest.  Surgery fololwing.       VTE prophylaxis: SCDs/TEDs    I have performed a physical exam and reviewed and updated ROS and Plan today (8/29/2022). In review of yesterday's note (8/28/2022), there are no changes except as documented above.        "

## 2022-08-30 ENCOUNTER — APPOINTMENT (OUTPATIENT)
Dept: RADIOLOGY | Facility: MEDICAL CENTER | Age: 32
DRG: 871 | End: 2022-08-30
Attending: STUDENT IN AN ORGANIZED HEALTH CARE EDUCATION/TRAINING PROGRAM
Payer: COMMERCIAL

## 2022-08-30 ENCOUNTER — APPOINTMENT (OUTPATIENT)
Dept: RADIOLOGY | Facility: MEDICAL CENTER | Age: 32
DRG: 871 | End: 2022-08-30
Attending: INTERNAL MEDICINE
Payer: COMMERCIAL

## 2022-08-30 PROBLEM — R18.8 ASCITES: Status: ACTIVE | Noted: 2022-08-30

## 2022-08-30 LAB
ALBUMIN SERPL BCP-MCNC: 2.6 G/DL (ref 3.2–4.9)
ALBUMIN/GLOB SERPL: 0.9 G/DL
ALP SERPL-CCNC: 55 U/L (ref 30–99)
ALT SERPL-CCNC: 9 U/L (ref 2–50)
ANION GAP SERPL CALC-SCNC: 14 MMOL/L (ref 7–16)
AST SERPL-CCNC: 19 U/L (ref 12–45)
BACTERIA WND AEROBE CULT: ABNORMAL
BILIRUB SERPL-MCNC: 0.3 MG/DL (ref 0.1–1.5)
BUN SERPL-MCNC: 2 MG/DL (ref 8–22)
CALCIUM SERPL-MCNC: 8 MG/DL (ref 8.5–10.5)
CHLORIDE SERPL-SCNC: 101 MMOL/L (ref 96–112)
CO2 SERPL-SCNC: 22 MMOL/L (ref 20–33)
CREAT SERPL-MCNC: 0.45 MG/DL (ref 0.5–1.4)
ERYTHROCYTE [DISTWIDTH] IN BLOOD BY AUTOMATED COUNT: 44.6 FL (ref 35.9–50)
GFR SERPLBLD CREATININE-BSD FMLA CKD-EPI: 132 ML/MIN/1.73 M 2
GLOBULIN SER CALC-MCNC: 3 G/DL (ref 1.9–3.5)
GLUCOSE SERPL-MCNC: 102 MG/DL (ref 65–99)
GRAM STN SPEC: ABNORMAL
HCT VFR BLD AUTO: 32.8 % (ref 37–47)
HGB BLD-MCNC: 10.9 G/DL (ref 12–16)
MCH RBC QN AUTO: 27 PG (ref 27–33)
MCHC RBC AUTO-ENTMCNC: 33.2 G/DL (ref 33.6–35)
MCV RBC AUTO: 81.2 FL (ref 81.4–97.8)
PLATELET # BLD AUTO: 342 K/UL (ref 164–446)
PMV BLD AUTO: 8.4 FL (ref 9–12.9)
POTASSIUM SERPL-SCNC: 3.8 MMOL/L (ref 3.6–5.5)
PROT SERPL-MCNC: 5.6 G/DL (ref 6–8.2)
RBC # BLD AUTO: 4.04 M/UL (ref 4.2–5.4)
SIGNIFICANT IND 70042: ABNORMAL
SITE SITE: ABNORMAL
SODIUM SERPL-SCNC: 137 MMOL/L (ref 135–145)
SOURCE SOURCE: ABNORMAL
WBC # BLD AUTO: 19.3 K/UL (ref 4.8–10.8)

## 2022-08-30 PROCEDURE — 85027 COMPLETE CBC AUTOMATED: CPT

## 2022-08-30 PROCEDURE — 700105 HCHG RX REV CODE 258: Performed by: HOSPITALIST

## 2022-08-30 PROCEDURE — 700111 HCHG RX REV CODE 636 W/ 250 OVERRIDE (IP): Performed by: INTERNAL MEDICINE

## 2022-08-30 PROCEDURE — 700102 HCHG RX REV CODE 250 W/ 637 OVERRIDE(OP): Performed by: STUDENT IN AN ORGANIZED HEALTH CARE EDUCATION/TRAINING PROGRAM

## 2022-08-30 PROCEDURE — A9270 NON-COVERED ITEM OR SERVICE: HCPCS | Performed by: HOSPITALIST

## 2022-08-30 PROCEDURE — 99233 SBSQ HOSP IP/OBS HIGH 50: CPT | Performed by: STUDENT IN AN ORGANIZED HEALTH CARE EDUCATION/TRAINING PROGRAM

## 2022-08-30 PROCEDURE — 36415 COLL VENOUS BLD VENIPUNCTURE: CPT

## 2022-08-30 PROCEDURE — 74176 CT ABD & PELVIS W/O CONTRAST: CPT

## 2022-08-30 PROCEDURE — 770001 HCHG ROOM/CARE - MED/SURG/GYN PRIV*

## 2022-08-30 PROCEDURE — 700102 HCHG RX REV CODE 250 W/ 637 OVERRIDE(OP): Performed by: HOSPITALIST

## 2022-08-30 PROCEDURE — 76705 ECHO EXAM OF ABDOMEN: CPT

## 2022-08-30 PROCEDURE — 700111 HCHG RX REV CODE 636 W/ 250 OVERRIDE (IP): Performed by: STUDENT IN AN ORGANIZED HEALTH CARE EDUCATION/TRAINING PROGRAM

## 2022-08-30 PROCEDURE — 700102 HCHG RX REV CODE 250 W/ 637 OVERRIDE(OP): Performed by: INTERNAL MEDICINE

## 2022-08-30 PROCEDURE — A9270 NON-COVERED ITEM OR SERVICE: HCPCS | Performed by: INTERNAL MEDICINE

## 2022-08-30 PROCEDURE — 0W9G3ZZ DRAINAGE OF PERITONEAL CAVITY, PERCUTANEOUS APPROACH: ICD-10-PCS | Performed by: RADIOLOGY

## 2022-08-30 PROCEDURE — 80053 COMPREHEN METABOLIC PANEL: CPT

## 2022-08-30 PROCEDURE — A9270 NON-COVERED ITEM OR SERVICE: HCPCS

## 2022-08-30 PROCEDURE — 700102 HCHG RX REV CODE 250 W/ 637 OVERRIDE(OP)

## 2022-08-30 PROCEDURE — A9270 NON-COVERED ITEM OR SERVICE: HCPCS | Performed by: STUDENT IN AN ORGANIZED HEALTH CARE EDUCATION/TRAINING PROGRAM

## 2022-08-30 PROCEDURE — 99233 SBSQ HOSP IP/OBS HIGH 50: CPT | Performed by: SURGERY

## 2022-08-30 RX ORDER — ACETAMINOPHEN 500 MG
1000 TABLET ORAL 3 TIMES DAILY
Status: DISCONTINUED | OUTPATIENT
Start: 2022-08-30 | End: 2022-09-01 | Stop reason: HOSPADM

## 2022-08-30 RX ORDER — HYDROMORPHONE HYDROCHLORIDE 1 MG/ML
0.5 INJECTION, SOLUTION INTRAMUSCULAR; INTRAVENOUS; SUBCUTANEOUS 3 TIMES DAILY PRN
Status: DISCONTINUED | OUTPATIENT
Start: 2022-08-30 | End: 2022-09-01

## 2022-08-30 RX ORDER — PROCHLORPERAZINE EDISYLATE 5 MG/ML
10 INJECTION INTRAMUSCULAR; INTRAVENOUS EVERY 6 HOURS PRN
Status: DISCONTINUED | OUTPATIENT
Start: 2022-08-30 | End: 2022-09-01 | Stop reason: HOSPADM

## 2022-08-30 RX ADMIN — SIMETHICONE 125 MG: 125 TABLET, CHEWABLE ORAL at 06:02

## 2022-08-30 RX ADMIN — SULFAMETHOXAZOLE AND TRIMETHOPRIM 1 TABLET: 800; 160 TABLET ORAL at 17:19

## 2022-08-30 RX ADMIN — OXYCODONE HYDROCHLORIDE AND ACETAMINOPHEN 500 MG: 500 TABLET ORAL at 17:18

## 2022-08-30 RX ADMIN — SODIUM CHLORIDE, POTASSIUM CHLORIDE, SODIUM LACTATE AND CALCIUM CHLORIDE: 600; 310; 30; 20 INJECTION, SOLUTION INTRAVENOUS at 23:55

## 2022-08-30 RX ADMIN — ACETAMINOPHEN 650 MG: 325 TABLET ORAL at 00:42

## 2022-08-30 RX ADMIN — ONDANSETRON 4 MG: 2 INJECTION INTRAMUSCULAR; INTRAVENOUS at 06:10

## 2022-08-30 RX ADMIN — PROCHLORPERAZINE EDISYLATE 10 MG: 5 INJECTION INTRAMUSCULAR; INTRAVENOUS at 09:17

## 2022-08-30 RX ADMIN — OXYCODONE HYDROCHLORIDE AND ACETAMINOPHEN 500 MG: 500 TABLET ORAL at 05:31

## 2022-08-30 RX ADMIN — ACETAMINOPHEN 1000 MG: 500 TABLET ORAL at 23:54

## 2022-08-30 RX ADMIN — DOCUSATE SODIUM 50 MG AND SENNOSIDES 8.6 MG 2 TABLET: 8.6; 5 TABLET, FILM COATED ORAL at 05:31

## 2022-08-30 RX ADMIN — OXYCODONE 5 MG: 5 TABLET ORAL at 00:57

## 2022-08-30 RX ADMIN — ONDANSETRON 4 MG: 4 TABLET, ORALLY DISINTEGRATING ORAL at 17:19

## 2022-08-30 RX ADMIN — SODIUM CHLORIDE, POTASSIUM CHLORIDE, SODIUM LACTATE AND CALCIUM CHLORIDE: 600; 310; 30; 20 INJECTION, SOLUTION INTRAVENOUS at 05:35

## 2022-08-30 RX ADMIN — SULFAMETHOXAZOLE AND TRIMETHOPRIM 1 TABLET: 800; 160 TABLET ORAL at 05:31

## 2022-08-30 RX ADMIN — MULTIPLE VITAMINS W/ MINERALS TAB 1 TABLET: TAB at 05:31

## 2022-08-30 RX ADMIN — FAMOTIDINE 20 MG: 20 TABLET, FILM COATED ORAL at 05:31

## 2022-08-30 RX ADMIN — ACETAMINOPHEN 650 MG: 325 TABLET ORAL at 08:01

## 2022-08-30 RX ADMIN — ACETAMINOPHEN 1000 MG: 500 TABLET ORAL at 17:17

## 2022-08-30 RX ADMIN — FAMOTIDINE 20 MG: 20 TABLET, FILM COATED ORAL at 17:18

## 2022-08-30 ASSESSMENT — ENCOUNTER SYMPTOMS
WHEEZING: 0
ABDOMINAL PAIN: 1
CONSTIPATION: 0
EYE PAIN: 0
WEAKNESS: 0
ABDOMINAL PAIN: 0
DIZZINESS: 0
VOMITING: 0
INSOMNIA: 0
DIARRHEA: 0
LOSS OF CONSCIOUSNESS: 0
COUGH: 0
BLOOD IN STOOL: 0
TREMORS: 0
NERVOUS/ANXIOUS: 0
PALPITATIONS: 0
SHORTNESS OF BREATH: 0
SEIZURES: 0
NAUSEA: 0
CHILLS: 0
MYALGIAS: 0
HEADACHES: 0
FOCAL WEAKNESS: 0
FEVER: 0
EYE REDNESS: 0
FALLS: 0
HEMOPTYSIS: 0

## 2022-08-30 ASSESSMENT — PAIN SCALES - PAIN ASSESSMENT IN ADVANCED DEMENTIA (PAINAD)
BREATHING: NORMAL
FACIALEXPRESSION: SMILING OR INEXPRESSIVE
BODYLANGUAGE: RELAXED
CONSOLABILITY: NO NEED TO CONSOLE
TOTALSCORE: 0

## 2022-08-30 ASSESSMENT — PAIN DESCRIPTION - PAIN TYPE
TYPE: ACUTE PAIN
TYPE: ACUTE PAIN

## 2022-08-30 ASSESSMENT — PAIN SCALES - WONG BAKER: WONGBAKER_NUMERICALRESPONSE: HURTS A LITTLE MORE

## 2022-08-30 NOTE — PROGRESS NOTES
[facility] Medicine Daily Progress Note    Date of Service  8/30/2022    Chief Complaint  Brunilda Webber is a 32 y.o. female admitted 8/22/2022 with Abdominal Pain (Pt states she's been having abdominal pain since 8/17/2022. Pain has been progressively getting worse since then.), Vaginal Bleeding (Bleeding since Saturday, unsure if this is her menstrual cycle or not.  ), Fever (Pt states she had a fever of 100.8 at home for which she took ibuprofen. ), and N/V      Hospital Course  32 y.o. female who presented 8/22/2022 with abdominal pain and fever. Ms. Webber has previously known medical conditions that had an undergone in vitro fertilization and developed abdominal pain July 27 was found to have both a tubal and intrauterine pregnancy and underwent salpingectomy.  Intraoperatively she was found to have acute appendicitis therefore she also underwent appendectomy during that surgery.  Subsequently the intrauterine pregnancy was found to not be viable therefore underwent dilation and curettage on 8/6/2022.  On around 8/16/2022 she developed body aches, chills, fatigue with worsening abdominal pain.  Presented to urgent care where a COVID swab was negative and she was referred to the emergency room she also reported vaginal bleeding.  She underwent CAT scan of the abdomen pelvis in the emergency room revealing small to moderate ascites as well as hepatosplenomegaly.  She had a normal white count though elevated lactic acid at 3.3.  She did have a positive urinalysis though no urinary symptoms.  Given her hypotension that was refractory to IV fluids, she was admitted to the intensive care unit with a working diagnosis of septic shock.  Gynecology and surgery were consulted.     8/23/2022: Patient seen and evaluated in the ICU.  He continues to have significant abdominal pain now is more left-sided in nature whereas yesterday it was diffuse.  She is being scheduled for IR for drainage of the ascitic fluid.  She remains  "on IV Zosyn.  Cultures remain pending.  IV fluids have been restarted as she is NPO.\"  Dr. Austin  8/24. Discussed with prior attending. No enough fluid to do a diagnostic tap. Blood culture so far negative. Discussed with Dr. Maldonado and if can;t tap will keep IV Abx for 5 days then reimage  UPDATE: After further review, IR was contacted and will try to get diagnostic aspiration of pelvic abscess tomorrow.  8/25. IR unable to do the aspiration. Patient could not tolerate lying on stomach.   May need to repeat CT tomorrow instead.  8/26. After further clarification, she is opting to do IR as long as IR will explain the process. I explained that she may get a drain if there is enough fluid to drain.  8/27. S/p drain. Minimal serosanguinous output. Surgery following.   8/28. E. Coli pelvic abscess, sensitivities pending. Patient and /partner at bedside. Leukocytosis climbing.  8/29. Pansensitive E. Coli., WBC went up to 22K now down to 16.3K. She still has abdominal distension but this is soft on exam with hypoactive bowel sounds.     Interval Problem Update  Patient was eval bedside  No complaints this morning  Stable vitals  Leukocytosis at 19.3 this morning  Abdominal/pelvic CT showing improvement with drain site but patient was noted again for moderate amount of ascites as well as hepatosplenomegaly  Continue antibiotics  Pending RUQ U/S  Pending diagnostic/therapeutic paracentesis  Labs on AM    I have discussed this patient's plan of care and discharge plan at IDT rounds today with Case Management, Nursing, Nursing leadership, and other members of the IDT team.    Consultants/Specialty  General surgery  OB/GYN  Critical care medicine    Code Status  Full Code    Disposition  Patient is not medically cleared for discharge.   Anticipate discharge to TBD  I have placed the appropriate orders for post-discharge needs.    Review of Systems  Review of Systems   Constitutional:  Negative for chills and fever. "   HENT:  Negative for congestion, hearing loss and nosebleeds.    Eyes:  Negative for pain and redness.   Respiratory:  Negative for cough, hemoptysis, shortness of breath and wheezing.    Cardiovascular:  Negative for chest pain and palpitations.   Gastrointestinal:  Negative for abdominal pain, blood in stool, constipation, diarrhea, nausea and vomiting.   Genitourinary:  Negative for dysuria, frequency and hematuria.   Musculoskeletal:  Negative for falls, joint pain and myalgias.   Skin:  Negative for rash.   Neurological:  Negative for dizziness, tremors, focal weakness, seizures, loss of consciousness, weakness and headaches.   Psychiatric/Behavioral:  The patient is not nervous/anxious and does not have insomnia.    All other systems reviewed and are negative.     Physical Exam  Temp:  [36.2 °C (97.2 °F)-36.7 °C (98.1 °F)] 36.7 °C (98.1 °F)  Pulse:  [72-78] 78  Resp:  [16-20] 16  BP: (118-121)/(67-76) 121/76  SpO2:  [93 %-96 %] 96 %    Physical Exam  Vitals and nursing note reviewed.   Constitutional:       General: She is not in acute distress.     Comments: Malaised   HENT:      Head: Normocephalic and atraumatic.      Right Ear: External ear normal.      Left Ear: External ear normal.      Nose: Nose normal.      Mouth/Throat:      Mouth: Mucous membranes are moist.   Eyes:      General: No scleral icterus.     Conjunctiva/sclera: Conjunctivae normal.   Cardiovascular:      Rate and Rhythm: Normal rate and regular rhythm.      Pulses: Normal pulses.      Heart sounds: No murmur heard.    No friction rub. No gallop.   Pulmonary:      Effort: Pulmonary effort is normal. No respiratory distress.      Breath sounds: Normal breath sounds. No stridor. No wheezing, rhonchi or rales.   Chest:      Chest wall: No tenderness.   Abdominal:      General: Abdomen is flat. Bowel sounds are normal. There is distension (mild).      Palpations: Abdomen is soft.      Tenderness: There is abdominal tenderness. There is no  guarding or rebound.      Comments: Drain noted. 20-30mL serosanguinous drainage   Musculoskeletal:         General: No swelling, tenderness or deformity. Normal range of motion.      Cervical back: Normal range of motion and neck supple. No rigidity.   Skin:     General: Skin is warm.      Coloration: Skin is not jaundiced.   Neurological:      General: No focal deficit present.      Mental Status: She is alert and oriented to person, place, and time. Mental status is at baseline.      Motor: Weakness present.   Psychiatric:         Mood and Affect: Mood normal.         Behavior: Behavior normal.         Thought Content: Thought content normal.         Judgment: Judgment normal.      Comments: Somewhat anxious       Fluids    Intake/Output Summary (Last 24 hours) at 8/30/2022 1730  Last data filed at 8/30/2022 0745  Gross per 24 hour   Intake 800 ml   Output 12 ml   Net 788 ml       Laboratory  Recent Labs     08/28/22  2013 08/29/22  0533 08/30/22  1048   WBC 21.6* 16.3* 19.3*   RBC 4.09* 3.65* 4.04*   HEMOGLOBIN 11.1* 9.9* 10.9*   HEMATOCRIT 33.8* 29.8* 32.8*   MCV 82.6 81.6 81.2*   MCH 27.1 27.1 27.0   MCHC 32.8* 33.2* 33.2*   RDW 46.3 46.0 44.6   PLATELETCT 346 301 342   MPV 8.4* 8.5* 8.4*     Recent Labs     08/28/22  1000 08/29/22  0533 08/30/22  1048   SODIUM 130* 139 137   POTASSIUM 3.5* 3.6 3.8   CHLORIDE 96 103 101   CO2 23 27 22   GLUCOSE 109* 90 102*   BUN 7* 4* 2*   CREATININE 0.54 0.50 0.45*   CALCIUM 7.8* 7.9* 8.0*                     Imaging  CT-ABDOMEN-PELVIS W/O   Final Result      1.  Hepatosplenomegaly.      2.  Small to moderate amount of ascites noted throughout the abdomen.      3.  Locking loop drain identified in the pelvic cul-de-sac with resolution of pelvic abscess. If drain output is less than 10 mL per day the pelvic drain can be removed.      RN-QITHDOV-6 VIEW   Final Result      1.  Mildly dilated air-filled loops of small bowel in the left mid abdomen which could represent ileus  related to the inflammatory process in the pelvis. Early small bowel obstruction is also possible.      IR-US GUIDED PIV   Final Result    Ultrasound-guided PERIPHERAL IV INSERTION performed by    qualified nursing staff as above.      CT-DRAIN-PERITONEAL   Final Result      1.  CT guided pelvic abscess catheter drainage.   2.  The current plan is to obtain a follow-up CT in 5-7 days..      US-EXTREMITY VENOUS LOWER UNILAT LEFT   Final Result      DX-CHEST-PORTABLE (1 VIEW)   Final Result         1.  Left lower lobe infiltrate.   2.  Trace left pleural effusion      US-ABDOMEN LTD (SOFT TISSUE)   Final Result      Mild four-quadrant free fluid.      Loculated rim-enhancing fluid collection is suggested within the pelvis/cul-de-sac on the CT, possible abscess.      US-PELVIC TRANSABDOMINAL ONLY   Final Result      1.  Limited transabdominal study due to pain as well as patient refusing endovaginal exam.   2.  Note definite uterine abnormality seen.   3.  Ovaries are not visualized, obscured by bowel gas.      CT-ABDOMEN-PELVIS WITH   Final Result      1.  Hepatosplenomegaly.      2.  Small to moderate ascites throughout the abdomen and pelvis.      3.  Bilateral ovarian cysts, right greater than left.      DX-CHEST-PORTABLE (1 VIEW)   Final Result      No acute cardiac or pulmonary abnormalities are identified.      US-PARACENTESIS, ABD WITH IMAGING    (Results Pending)   US-RUQ    (Results Pending)          Assessment/Plan  * Septic shock, ascites, possible pelvic abscess (HCC)- (present on admission)  Assessment & Plan  This is Septic shock Present on admission  SIRS criteria identified on my evaluation include: Fever, with temperature greater than 101 deg F  Indicators of septic shock include: Severe sepsis present and persistent hypotension after 30 ml/kg completed.   Sources is: abdominal source  Sepsis protocol initiated  Crystalloid Fluid Administration: Fluid resuscitation ordered per standard protocol - 30  "mL/kg per current or ideal body weight  IV antibiotics as appropriate for source of sepsis IV Zosyn  Reassessment: I have reassessed the patient's hemodynamic status  IR consulted for aspiration of fluid for culture/cells. Hold Lovenox. Restart IV fluids.\"    Discussed with Dr. Maldonado  IR aspiration failed  8/26. Rety IR aspiration at 3  Surgery is an option and she is open to this now  Continue antibiotics.  8/27. S/p IR aspiration and drain  Await cultures and sensitivities.  Surgery following drain  Leukocytosis 18-16-18K  Leukocytosis increased to 22K  Leukocytosis now downtrending  Pansensitive E. Coli on culture. Continue Abx,  Aderess ileus        Ascites  Assessment & Plan  Noted on CT imaging  Patient for paracentesis on a.m.  Pending labs    Contraception management  Assessment & Plan  Noted  Hold off for now, lower risk of thrombiosis. Patient ok with that.    Hyponatremia- (present on admission)  Assessment & Plan  Hypovolemic\"    Resolved    Hepatosplenomegaly- (present on admission)  Assessment & Plan  Noted on CT scan    Generalized abdominal pain- (present on admission)  Assessment & Plan  Initially diffuse now left lower quadrant.  Increase the frequency of dilaudid to hourly while NPO\"    Ordered KUB. Ileus  Moving bowels unlikely SBO  Downgrade diet to clears, give PPI, antiemetics, bowel rest.  Surgery fololwing.       VTE prophylaxis: SCDs/TEDs    I have performed a physical exam and reviewed and updated ROS and Plan today (8/30/2022). In review of yesterday's note (8/29/2022), there are no changes except as documented above.        "

## 2022-08-30 NOTE — DIETARY
Nutrition services: Day 8 of admit.  Brunilda Webber is a 32 y.o. female with admitting DX of Sepsis.    Supplements ordered per RD. Pt previously receiving Low Fiber, Regular diet with initial good PO intake. Now with Ileus and diet downgraded yesterday to Clear Liquids. Providing Boost Breeze supplements while on clear liquids.    Recommendations/Plan:  Boost Breeze with meals while on clear liquids.  Diet advancement per MD.   Encourage intake of meals >50%.  Document intake of all meals and supplements as % taken in ADL's to provide interdisciplinary communication across all shifts.    RD following

## 2022-08-30 NOTE — CARE PLAN
The patient is Stable - Low risk of patient condition declining or worsening    Shift Goals  Clinical Goals: pain mgt, rest, monitor drain output  Patient Goals: pain control, rest and sleep  Family Goals: n/a      Problem: Pain - Standard  Goal: Alleviation of pain or a reduction in pain to the patient’s comfort goal  8/30/2022 0123 by Victoria Sargent R.N.  Outcome: Progressing  8/30/2022 0120 by Victoria Sargent R.N.  Outcome: Progressing     Problem: Knowledge Deficit - Standard  Goal: Patient and family/care givers will demonstrate understanding of plan of care, disease process/condition, diagnostic tests and medications  8/30/2022 0123 by Victoria Sargent R.N.  Outcome: Progressing  8/30/2022 0120 by THI JiménezNMagalis  Outcome: Progressing     Problem: Fluid Volume  Goal: Fluid volume balance will be maintained  Outcome: Progressing       Progress made toward(s) clinical / shift goals:  Patient is alert and oriented x 4. Complaint of abdominal pain. LESLIE drain in place with CDI dressing. Pain medication, heat pack provided. Pan for CT tomorrow. Call light and personal items within  reached. Kept safe and monitored.    Patient is not progressing towards the following goals:

## 2022-08-30 NOTE — PROGRESS NOTES
"Look  DATE: 2022    Hospital Day 8 abdominal pain.    PHYSICAL EXAMINATION:  Vital Signs: /76   Pulse 78   Temp 36.7 °C (98.1 °F) (Temporal)   Resp 16   Ht 1.702 m (5' 7\")   Wt 88.5 kg (195 lb 1.7 oz)   SpO2 96%     LABORATORY VALUES:     Surgical Progress Note    Author: Atilio Hutchins M.D.      Interval Events:  Drain volume has decreased significantly no longer purulent  Patient reports initially was quite purulent abdomen remains distended but is improving  Patient looks better clinically today  White count actually increasing  Afebrile  Repeat CT scan shows abscess cavity and pelvis to be collapsed.  She does have moderate amount of ascites  In light of increasing leukocytosis I would suggest consideration of drainage of the ascitic fluid to make sure it is also not contributing      Review of Systems   Constitutional:  Negative for chills and fever.   Gastrointestinal:  Positive for abdominal pain.   Hemodynamics:  Temp (24hrs), Av.4 °C (97.6 °F), Min:36.2 °C (97.2 °F), Max:36.7 °C (98.1 °F)  Temperature: 36.7 °C (98.1 °F)  Pulse  Av.7  Min: 67  Max: 146   Blood Pressure: 121/76     Respiratory:    Respiration: 16, Pulse Oximetry: 96 %        RUL Breath Sounds: Clear, RML Breath Sounds: Diminished, RLL Breath Sounds: Diminished, MALU Breath Sounds: Clear, LLL Breath Sounds: Diminished  Neuro:  GCS       Fluids:    Intake/Output Summary (Last 24 hours) at 2022 1155  Last data filed at 2022 0800  Gross per 24 hour   Intake 480 ml   Output 15 ml   Net 465 ml        Current Diet Order   Procedures    Diet Order Diet: Clear Liquid     Physical Exam  HENT:      Head: Normocephalic.   Cardiovascular:      Rate and Rhythm: Normal rate and regular rhythm.   Pulmonary:      Effort: Pulmonary effort is normal.      Breath sounds: Normal breath sounds.   Abdominal:      General: There is distension.      Tenderness: There is abdominal tenderness.   Musculoskeletal:         General: " Swelling present.   Skin:     Capillary Refill: Capillary refill takes less than 2 seconds.   Neurological:      General: No focal deficit present.      Mental Status: She is alert.   Psychiatric:         Mood and Affect: Mood normal.     Labs:  Recent Results (from the past 24 hour(s))   CBC without Differential    Collection Time: 08/30/22 10:48 AM   Result Value Ref Range    WBC 19.3 (H) 4.8 - 10.8 K/uL    RBC 4.04 (L) 4.20 - 5.40 M/uL    Hemoglobin 10.9 (L) 12.0 - 16.0 g/dL    Hematocrit 32.8 (L) 37.0 - 47.0 %    MCV 81.2 (L) 81.4 - 97.8 fL    MCH 27.0 27.0 - 33.0 pg    MCHC 33.2 (L) 33.6 - 35.0 g/dL    RDW 44.6 35.9 - 50.0 fL    Platelet Count 342 164 - 446 K/uL    MPV 8.4 (L) 9.0 - 12.9 fL     Medical Decision Making, by Problem:  Active Hospital Problems    Diagnosis     Generalized abdominal pain [R10.84]      Priority: High    Ileus (HCC) [K56.7]     Septic shock, ascites, possible pelvic abscess (HCC) [A41.9, R65.21]     Hepatosplenomegaly [R16.2]     Hyponatremia [E87.1]     Contraception management [Z30.9]      Plan:  Continue antibiotics and drain for now  Consider drainage of other abdominal fluid collections to make sure these are also not infected    Quality Measures:  Quality-Core Measures    Discussed patient condition with Patient

## 2022-08-30 NOTE — PROGRESS NOTES
Patient is alert and oriented x 4. Complaint of abdominal pain. LESLIE drain in place with CDI dressing. Pain medication, heat pack provided. Pan for CT tomorrow. Call light and personal items within  reached. Kept safe and monitored.

## 2022-08-30 NOTE — PROGRESS NOTES
Assumed care for this patient. Patient is alert and oriented x 4. Flat affect. Fresh ice water given, per request. On clear liquid diet. IV patent. Ongoing LR x 125 cc/hr. Call light and personal items within reached.

## 2022-08-31 ENCOUNTER — APPOINTMENT (OUTPATIENT)
Dept: RADIOLOGY | Facility: MEDICAL CENTER | Age: 32
DRG: 871 | End: 2022-08-31
Attending: STUDENT IN AN ORGANIZED HEALTH CARE EDUCATION/TRAINING PROGRAM
Payer: COMMERCIAL

## 2022-08-31 PROBLEM — K65.1 INTRA-ABDOMINAL ABSCESS (HCC): Status: ACTIVE | Noted: 2022-08-31

## 2022-08-31 LAB
ALBUMIN FLD-MCNC: 2.1 G/DL
ALBUMIN SERPL BCP-MCNC: 2.9 G/DL (ref 3.2–4.9)
ALBUMIN/GLOB SERPL: 0.8 G/DL
ALP SERPL-CCNC: 60 U/L (ref 30–99)
ALT SERPL-CCNC: 10 U/L (ref 2–50)
ANION GAP SERPL CALC-SCNC: 10 MMOL/L (ref 7–16)
ANISOCYTOSIS BLD QL SMEAR: ABNORMAL
APPEARANCE FLD: NORMAL
AST SERPL-CCNC: 16 U/L (ref 12–45)
BASOPHILS # BLD AUTO: 0 % (ref 0–1.8)
BASOPHILS # BLD: 0 K/UL (ref 0–0.12)
BILIRUB SERPL-MCNC: 0.2 MG/DL (ref 0.1–1.5)
BODY FLD TYPE: NORMAL
BODY FLD TYPE: NORMAL
BUN SERPL-MCNC: 3 MG/DL (ref 8–22)
CALCIUM SERPL-MCNC: 8.7 MG/DL (ref 8.5–10.5)
CHLORIDE SERPL-SCNC: 101 MMOL/L (ref 96–112)
CO2 SERPL-SCNC: 25 MMOL/L (ref 20–33)
COLOR FLD: YELLOW
CREAT SERPL-MCNC: 0.56 MG/DL (ref 0.5–1.4)
CSF COMMENTS 1658: NORMAL
EOSINOPHIL # BLD AUTO: 0.34 K/UL (ref 0–0.51)
EOSINOPHIL NFR BLD: 1.8 % (ref 0–6.9)
ERYTHROCYTE [DISTWIDTH] IN BLOOD BY AUTOMATED COUNT: 46.3 FL (ref 35.9–50)
GFR SERPLBLD CREATININE-BSD FMLA CKD-EPI: 125 ML/MIN/1.73 M 2
GLOBULIN SER CALC-MCNC: 3.5 G/DL (ref 1.9–3.5)
GLUCOSE SERPL-MCNC: 103 MG/DL (ref 65–99)
GRAM STN SPEC: NORMAL
HCT VFR BLD AUTO: 36.5 % (ref 37–47)
HGB BLD-MCNC: 12 G/DL (ref 12–16)
LYMPHOCYTES # BLD AUTO: 1.67 K/UL (ref 1–4.8)
LYMPHOCYTES NFR BLD: 8.9 % (ref 22–41)
LYMPHOCYTES NFR FLD: 2 %
MANUAL DIFF BLD: NORMAL
MCH RBC QN AUTO: 27 PG (ref 27–33)
MCHC RBC AUTO-ENTMCNC: 32.9 G/DL (ref 33.6–35)
MCV RBC AUTO: 82.2 FL (ref 81.4–97.8)
METAMYELOCYTES NFR BLD MANUAL: 2.7 %
MICROCYTES BLD QL SMEAR: ABNORMAL
MONOCYTES # BLD AUTO: 1 K/UL (ref 0–0.85)
MONOCYTES NFR BLD AUTO: 5.3 % (ref 0–13.4)
MONONUC CELLS NFR FLD: 2 %
MORPHOLOGY BLD-IMP: NORMAL
MYELOCYTES NFR BLD MANUAL: 3.6 %
NEUTROPHILS # BLD AUTO: 14.61 K/UL (ref 2–7.15)
NEUTROPHILS NFR BLD: 77.7 % (ref 44–72)
NEUTROPHILS NFR FLD: 96 %
NRBC # BLD AUTO: 0 K/UL
NRBC BLD-RTO: 0 /100 WBC
OVALOCYTES BLD QL SMEAR: NORMAL
PLATELET # BLD AUTO: 456 K/UL (ref 164–446)
PLATELET BLD QL SMEAR: NORMAL
PMV BLD AUTO: 8.5 FL (ref 9–12.9)
POIKILOCYTOSIS BLD QL SMEAR: NORMAL
POLYCHROMASIA BLD QL SMEAR: NORMAL
POTASSIUM SERPL-SCNC: 4.2 MMOL/L (ref 3.6–5.5)
PROT FLD-MCNC: 4.1 G/DL
PROT SERPL-MCNC: 6.4 G/DL (ref 6–8.2)
RBC # BLD AUTO: 4.44 M/UL (ref 4.2–5.4)
RBC # FLD: <2000 CELLS/UL
RBC BLD AUTO: PRESENT
SIGNIFICANT IND 70042: NORMAL
SITE SITE: NORMAL
SODIUM SERPL-SCNC: 136 MMOL/L (ref 135–145)
SOURCE SOURCE: NORMAL
WBC # BLD AUTO: 18.8 K/UL (ref 4.8–10.8)
WBC # FLD: 79 CELLS/UL

## 2022-08-31 PROCEDURE — A9270 NON-COVERED ITEM OR SERVICE: HCPCS | Performed by: STUDENT IN AN ORGANIZED HEALTH CARE EDUCATION/TRAINING PROGRAM

## 2022-08-31 PROCEDURE — 700102 HCHG RX REV CODE 250 W/ 637 OVERRIDE(OP): Performed by: INTERNAL MEDICINE

## 2022-08-31 PROCEDURE — 89051 BODY FLUID CELL COUNT: CPT

## 2022-08-31 PROCEDURE — 84157 ASSAY OF PROTEIN OTHER: CPT

## 2022-08-31 PROCEDURE — 700101 HCHG RX REV CODE 250: Performed by: STUDENT IN AN ORGANIZED HEALTH CARE EDUCATION/TRAINING PROGRAM

## 2022-08-31 PROCEDURE — 80053 COMPREHEN METABOLIC PANEL: CPT

## 2022-08-31 PROCEDURE — 700102 HCHG RX REV CODE 250 W/ 637 OVERRIDE(OP): Performed by: STUDENT IN AN ORGANIZED HEALTH CARE EDUCATION/TRAINING PROGRAM

## 2022-08-31 PROCEDURE — A9270 NON-COVERED ITEM OR SERVICE: HCPCS | Performed by: INTERNAL MEDICINE

## 2022-08-31 PROCEDURE — 99233 SBSQ HOSP IP/OBS HIGH 50: CPT | Performed by: STUDENT IN AN ORGANIZED HEALTH CARE EDUCATION/TRAINING PROGRAM

## 2022-08-31 PROCEDURE — A9270 NON-COVERED ITEM OR SERVICE: HCPCS

## 2022-08-31 PROCEDURE — 700111 HCHG RX REV CODE 636 W/ 250 OVERRIDE (IP): Performed by: STUDENT IN AN ORGANIZED HEALTH CARE EDUCATION/TRAINING PROGRAM

## 2022-08-31 PROCEDURE — 49083 ABD PARACENTESIS W/IMAGING: CPT

## 2022-08-31 PROCEDURE — 87015 SPECIMEN INFECT AGNT CONCNTJ: CPT

## 2022-08-31 PROCEDURE — 36415 COLL VENOUS BLD VENIPUNCTURE: CPT

## 2022-08-31 PROCEDURE — 99233 SBSQ HOSP IP/OBS HIGH 50: CPT | Performed by: SURGERY

## 2022-08-31 PROCEDURE — 87070 CULTURE OTHR SPECIMN AEROBIC: CPT

## 2022-08-31 PROCEDURE — 85025 COMPLETE CBC W/AUTO DIFF WBC: CPT

## 2022-08-31 PROCEDURE — 770001 HCHG ROOM/CARE - MED/SURG/GYN PRIV*

## 2022-08-31 PROCEDURE — 700102 HCHG RX REV CODE 250 W/ 637 OVERRIDE(OP)

## 2022-08-31 PROCEDURE — 86038 ANTINUCLEAR ANTIBODIES: CPT

## 2022-08-31 PROCEDURE — 82042 OTHER SOURCE ALBUMIN QUAN EA: CPT

## 2022-08-31 PROCEDURE — 87205 SMEAR GRAM STAIN: CPT

## 2022-08-31 PROCEDURE — 85007 BL SMEAR W/DIFF WBC COUNT: CPT

## 2022-08-31 PROCEDURE — 700105 HCHG RX REV CODE 258: Performed by: HOSPITALIST

## 2022-08-31 RX ORDER — METRONIDAZOLE 500 MG/100ML
500 INJECTION, SOLUTION INTRAVENOUS EVERY 8 HOURS
Status: DISCONTINUED | OUTPATIENT
Start: 2022-08-31 | End: 2022-09-01 | Stop reason: HOSPADM

## 2022-08-31 RX ORDER — CHOLECALCIFEROL (VITAMIN D3) 125 MCG
5 CAPSULE ORAL ONCE
Status: COMPLETED | OUTPATIENT
Start: 2022-08-31 | End: 2022-08-31

## 2022-08-31 RX ADMIN — OXYCODONE HYDROCHLORIDE AND ACETAMINOPHEN 500 MG: 500 TABLET ORAL at 05:38

## 2022-08-31 RX ADMIN — SULFAMETHOXAZOLE AND TRIMETHOPRIM 1 TABLET: 800; 160 TABLET ORAL at 05:38

## 2022-08-31 RX ADMIN — DOCUSATE SODIUM 50 MG AND SENNOSIDES 8.6 MG 2 TABLET: 8.6; 5 TABLET, FILM COATED ORAL at 05:38

## 2022-08-31 RX ADMIN — METRONIDAZOLE 500 MG: 5 INJECTION, SOLUTION INTRAVENOUS at 21:32

## 2022-08-31 RX ADMIN — OXYCODONE HYDROCHLORIDE AND ACETAMINOPHEN 500 MG: 500 TABLET ORAL at 17:00

## 2022-08-31 RX ADMIN — ACETAMINOPHEN 1000 MG: 500 TABLET ORAL at 10:27

## 2022-08-31 RX ADMIN — FAMOTIDINE 20 MG: 20 TABLET, FILM COATED ORAL at 05:38

## 2022-08-31 RX ADMIN — FAMOTIDINE 20 MG: 20 TABLET, FILM COATED ORAL at 17:00

## 2022-08-31 RX ADMIN — SODIUM CHLORIDE, POTASSIUM CHLORIDE, SODIUM LACTATE AND CALCIUM CHLORIDE: 600; 310; 30; 20 INJECTION, SOLUTION INTRAVENOUS at 07:45

## 2022-08-31 RX ADMIN — ACETAMINOPHEN 1000 MG: 500 TABLET ORAL at 21:31

## 2022-08-31 RX ADMIN — MULTIPLE VITAMINS W/ MINERALS TAB 1 TABLET: TAB at 05:38

## 2022-08-31 RX ADMIN — SODIUM CHLORIDE, POTASSIUM CHLORIDE, SODIUM LACTATE AND CALCIUM CHLORIDE: 600; 310; 30; 20 INJECTION, SOLUTION INTRAVENOUS at 21:33

## 2022-08-31 RX ADMIN — Medication 5 MG: at 23:20

## 2022-08-31 RX ADMIN — CEFTRIAXONE SODIUM 2 G: 10 INJECTION, POWDER, FOR SOLUTION INTRAVENOUS at 12:46

## 2022-08-31 RX ADMIN — ACETAMINOPHEN 1000 MG: 500 TABLET ORAL at 14:43

## 2022-08-31 RX ADMIN — METRONIDAZOLE 500 MG: 5 INJECTION, SOLUTION INTRAVENOUS at 14:39

## 2022-08-31 ASSESSMENT — ENCOUNTER SYMPTOMS
CHILLS: 0
COUGH: 0
HEADACHES: 0
DIARRHEA: 0
ABDOMINAL PAIN: 1
SEIZURES: 0
HEMOPTYSIS: 0
VOMITING: 0
DIZZINESS: 0
FOCAL WEAKNESS: 0
WHEEZING: 0
SHORTNESS OF BREATH: 0
LOSS OF CONSCIOUSNESS: 0
NAUSEA: 0
BLOOD IN STOOL: 0
INSOMNIA: 0
MYALGIAS: 0
FALLS: 0
EYE PAIN: 0
CONSTIPATION: 0
PALPITATIONS: 0
NERVOUS/ANXIOUS: 0
EYE REDNESS: 0
FEVER: 0
WEAKNESS: 0
ABDOMINAL PAIN: 0
TREMORS: 0

## 2022-08-31 ASSESSMENT — FIBROSIS 4 INDEX: FIB4 SCORE: .5555555555555555556

## 2022-08-31 ASSESSMENT — PAIN DESCRIPTION - PAIN TYPE: TYPE: ACUTE PAIN

## 2022-08-31 NOTE — PROGRESS NOTES
[facility] Medicine Daily Progress Note    Date of Service  8/31/2022    Chief Complaint  Brunilda Webber is a 32 y.o. female admitted 8/22/2022 with Abdominal Pain (Pt states she's been having abdominal pain since 8/17/2022. Pain has been progressively getting worse since then.), Vaginal Bleeding (Bleeding since Saturday, unsure if this is her menstrual cycle or not.  ), Fever (Pt states she had a fever of 100.8 at home for which she took ibuprofen. ), and N/V      Hospital Course  32 y.o. female who presented 8/22/2022 with abdominal pain and fever. Ms. Webber has previously known medical conditions that had an undergone in vitro fertilization and developed abdominal pain July 27 was found to have both a tubal and intrauterine pregnancy and underwent salpingectomy.  Intraoperatively she was found to have acute appendicitis therefore she also underwent appendectomy during that surgery.  Subsequently the intrauterine pregnancy was found to not be viable therefore underwent dilation and curettage on 8/6/2022.  On around 8/16/2022 she developed body aches, chills, fatigue with worsening abdominal pain.  Presented to urgent care where a COVID swab was negative and she was referred to the emergency room she also reported vaginal bleeding.  She underwent CAT scan of the abdomen pelvis in the emergency room revealing small to moderate ascites as well as hepatosplenomegaly.  She had a normal white count though elevated lactic acid at 3.3.  She did have a positive urinalysis though no urinary symptoms.  Given her hypotension that was refractory to IV fluids, she was admitted to the intensive care unit with a working diagnosis of septic shock.  Gynecology and surgery were consulted.     8/23/2022: Patient seen and evaluated in the ICU.  He continues to have significant abdominal pain now is more left-sided in nature whereas yesterday it was diffuse.  She is being scheduled for IR for drainage of the ascitic fluid.  She remains  "on IV Zosyn.  Cultures remain pending.  IV fluids have been restarted as she is NPO.\"  Dr. Austin  8/24. Discussed with prior attending. No enough fluid to do a diagnostic tap. Blood culture so far negative. Discussed with Dr. Maldonado and if can;t tap will keep IV Abx for 5 days then reimage  UPDATE: After further review, IR was contacted and will try to get diagnostic aspiration of pelvic abscess tomorrow.  8/25. IR unable to do the aspiration. Patient could not tolerate lying on stomach.   May need to repeat CT tomorrow instead.  8/26. After further clarification, she is opting to do IR as long as IR will explain the process. I explained that she may get a drain if there is enough fluid to drain.  8/27. S/p drain. Minimal serosanguinous output. Surgery following.   8/28. E. Coli pelvic abscess, sensitivities pending. Patient and /partner at bedside. Leukocytosis climbing.  8/29. Pansensitive E. Coli., WBC went up to 22K now down to 16.3K. She still has abdominal distension but this is soft on exam with hypoactive bowel sounds.     Abdominal/pelvic CT showing improvement with drain site but patient was noted again for moderate amount of ascites as well as hepatosplenomegaly    Interval Problem Update  Patient was eval bedside  Status post pelvic drain removal and paracentesis by IR  Improvement with abdominal pain  Stable vitals  Leukocytosis trending up slowly  SAAG 0.8, not due to Portal HTN  Patient noted for left upper to mid abdominal fluid collection concerning for new abscess  Switch to Rocephin/metronidazole antibiotic regimen  IR recommending possible washout by general surgery on AM  General surgery following, possible OR on a.m.  Labs on AM    NPO at midnight, hold chemical DVT prophylaxis for now    I have discussed this patient's plan of care and discharge plan at IDT rounds today with Case Management, Nursing, Nursing leadership, and other members of the IDT " team.    Consultants/Specialty  General surgery  OB/GYN  Critical care medicine    Code Status  Full Code    Disposition  Patient is not medically cleared for discharge.   Anticipate discharge to D  I have placed the appropriate orders for post-discharge needs.    Review of Systems  Review of Systems   Constitutional:  Negative for chills and fever.   HENT:  Negative for congestion, hearing loss and nosebleeds.    Eyes:  Negative for pain and redness.   Respiratory:  Negative for cough, hemoptysis, shortness of breath and wheezing.    Cardiovascular:  Negative for chest pain and palpitations.   Gastrointestinal:  Negative for abdominal pain, blood in stool, constipation, diarrhea, nausea and vomiting.   Genitourinary:  Negative for dysuria, frequency and hematuria.   Musculoskeletal:  Negative for falls, joint pain and myalgias.   Skin:  Negative for rash.   Neurological:  Negative for dizziness, tremors, focal weakness, seizures, loss of consciousness, weakness and headaches.   Psychiatric/Behavioral:  The patient is not nervous/anxious and does not have insomnia.    All other systems reviewed and are negative.     Physical Exam  Temp:  [36.7 °C (98 °F)-36.8 °C (98.3 °F)] 36.7 °C (98 °F)  Pulse:  [64-74] 69  Resp:  [16-19] 16  BP: (121-125)/(51-79) 125/79  SpO2:  [97 %-100 %] 100 %    Physical Exam  Vitals and nursing note reviewed.   Constitutional:       General: She is not in acute distress.     Comments: Malaised   HENT:      Head: Normocephalic and atraumatic.      Right Ear: External ear normal.      Left Ear: External ear normal.      Nose: Nose normal.      Mouth/Throat:      Mouth: Mucous membranes are moist.   Eyes:      General: No scleral icterus.     Conjunctiva/sclera: Conjunctivae normal.   Cardiovascular:      Rate and Rhythm: Normal rate and regular rhythm.      Pulses: Normal pulses.      Heart sounds: No murmur heard.    No friction rub. No gallop.   Pulmonary:      Effort: Pulmonary effort is  normal. No respiratory distress.      Breath sounds: Normal breath sounds. No stridor. No wheezing, rhonchi or rales.   Chest:      Chest wall: No tenderness.   Abdominal:      General: Abdomen is flat. Bowel sounds are normal. There is distension (mild).      Palpations: Abdomen is soft.      Tenderness: There is abdominal tenderness. There is no guarding or rebound.   Musculoskeletal:         General: No swelling, tenderness or deformity. Normal range of motion.      Cervical back: Normal range of motion and neck supple. No rigidity.   Skin:     General: Skin is warm.      Coloration: Skin is not jaundiced.   Neurological:      General: No focal deficit present.      Mental Status: She is alert and oriented to person, place, and time. Mental status is at baseline.      Motor: Weakness present.   Psychiatric:         Mood and Affect: Mood normal.         Behavior: Behavior normal.         Thought Content: Thought content normal.         Judgment: Judgment normal.      Comments: Somewhat anxious       Fluids    Intake/Output Summary (Last 24 hours) at 8/31/2022 1438  Last data filed at 8/31/2022 0600  Gross per 24 hour   Intake --   Output 4 ml   Net -4 ml       Laboratory  Recent Labs     08/29/22  0533 08/30/22  1048 08/31/22  0943   WBC 16.3* 19.3* 18.8*   RBC 3.65* 4.04* 4.44   HEMOGLOBIN 9.9* 10.9* 12.0   HEMATOCRIT 29.8* 32.8* 36.5*   MCV 81.6 81.2* 82.2   MCH 27.1 27.0 27.0   MCHC 33.2* 33.2* 32.9*   RDW 46.0 44.6 46.3   PLATELETCT 301 342 456*   MPV 8.5* 8.4* 8.5*     Recent Labs     08/29/22  0533 08/30/22  1048 08/31/22  0943   SODIUM 139 137 136   POTASSIUM 3.6 3.8 4.2   CHLORIDE 103 101 101   CO2 27 22 25   GLUCOSE 90 102* 103*   BUN 4* 2* 3*   CREATININE 0.50 0.45* 0.56   CALCIUM 7.9* 8.0* 8.7                     Imaging  US-PARACENTESIS, ABD WITH IMAGING   Final Result      1. Ultrasound-guided diagnostic and therapeutic paracentesis of a localized fluid collection in the right mid abdomen.      2. 150  mL of fluid withdrawn.      US-RUQ   Final Result      1.  14 x 5.8 x 6.8 cm complex fluid collection in the left upper/mid abdomen, hematoma and/or abscess.   2.  Small loculated four-quadrant fluid.   3.  Hepatomegaly.      CT-ABDOMEN-PELVIS W/O   Final Result      1.  Hepatosplenomegaly.      2.  Small to moderate amount of ascites noted throughout the abdomen.      3.  Locking loop drain identified in the pelvic cul-de-sac with resolution of pelvic abscess. If drain output is less than 10 mL per day the pelvic drain can be removed.      [id number] VIEW   Final Result      1.  Mildly dilated air-filled loops of small bowel in the left mid abdomen which could represent ileus related to the inflammatory process in the pelvis. Early small bowel obstruction is also possible.      IR-US GUIDED PIV   Final Result    Ultrasound-guided PERIPHERAL IV INSERTION performed by    qualified nursing staff as above.      CT-DRAIN-PERITONEAL   Final Result      1.  CT guided pelvic abscess catheter drainage.   2.  The current plan is to obtain a follow-up CT in 5-7 days..      US-EXTREMITY VENOUS LOWER UNILAT LEFT   Final Result      DX-CHEST-PORTABLE (1 VIEW)   Final Result         1.  Left lower lobe infiltrate.   2.  Trace left pleural effusion      US-ABDOMEN LTD (SOFT TISSUE)   Final Result      Mild four-quadrant free fluid.      Loculated rim-enhancing fluid collection is suggested within the pelvis/cul-de-sac on the CT, possible abscess.      US-PELVIC TRANSABDOMINAL ONLY   Final Result      1.  Limited transabdominal study due to pain as well as patient refusing endovaginal exam.   2.  Note definite uterine abnormality seen.   3.  Ovaries are not visualized, obscured by bowel gas.      CT-ABDOMEN-PELVIS WITH   Final Result      1.  Hepatosplenomegaly.      2.  Small to moderate ascites throughout the abdomen and pelvis.      3.  Bilateral ovarian cysts, right greater than left.      DX-CHEST-PORTABLE (1 VIEW)   Final  "Result      No acute cardiac or pulmonary abnormalities are identified.             Assessment/Plan  * Intra-abdominal abscess (HCC)  Assessment & Plan  8/30/2022 right upper quadrant ultrasound showing mid to left-sided upper abdominal fluid collection concerning for abscess  Continue IV antibiotics  IR recommending surgical washout  General surgery following, appreciate recommendations  Labs in a.m.    Ascites  Assessment & Plan  Status post paracentesis on 8/31  SAAG 0.8, not due to Portal HTN  Continue ABx  Monitor    Contraception management- (present on admission)  Assessment & Plan  Noted  Hold off for now, lower risk of thrombiosis. Patient ok with that.    Hyponatremia- (present on admission)  Assessment & Plan  Hypovolemic\"    Resolved    Hepatosplenomegaly- (present on admission)  Assessment & Plan  Noted on CT scan    Generalized abdominal pain- (present on admission)  Assessment & Plan  Initially diffuse now left lower quadrant.  Increase the frequency of dilaudid to hourly while NPO\"    Ordered KUB. Ileus  Moving bowels unlikely SBO  Downgrade diet to clears, give PPI, antiemetics, bowel rest.  Surgery fololwing.    Septic shock, ascites, possible pelvic abscess (HCC)- (present on admission)  Assessment & Plan  Resolved       VTE prophylaxis: SCDs/TEDs    I have performed a physical exam and reviewed and updated ROS and Plan today (8/31/2022). In review of yesterday's note (8/30/2022), there are no changes except as documented above.        "

## 2022-08-31 NOTE — CARE PLAN
The patient is Stable - Low risk of patient condition declining or worsening    Shift Goals  Clinical Goals: pain and nausea management, monitor I/O,CT scan  Patient Goals: pain and nausea control  Family Goals: n/a      Problem: Pain - Standard  Goal: Alleviation of pain or a reduction in pain to the patient’s comfort goal  Outcome: Progressing     Problem: Knowledge Deficit - Standard  Goal: Patient and family/care givers will demonstrate understanding of plan of care, disease process/condition, diagnostic tests and medications  Outcome: Progressing     Problem: Hemodynamics  Goal: Patient's hemodynamics, fluid balance and neurologic status will be stable or improve  Outcome: Progressing     Problem: Fluid Volume  Goal: Fluid volume balance will be maintained  Outcome: Progressing       Progress made toward(s) clinical / shift goals:      Patient is alert and oriented x 4. Patient is now on low fiber,diet. Tolerating at this time. LR x 125, flushing well. + BM, + passing of gas. Still with tender, distended abdomen. CT showed hepatomegaly, small to moderate ascites. Plan to continue abx and consider drainage pf the abdominal fluid per notes of the surgery dept. Plan of care education provided and patient understood and agreed. Call light and personal items within reached. Kept safe and monitored.     Patient is not progressing towards the following goals:

## 2022-08-31 NOTE — ASSESSMENT & PLAN NOTE
8/30/2022 right upper quadrant ultrasound showing mid to left-sided upper abdominal fluid collection concerning for abscess  Continue IV antibiotics  IR recommending surgical washout  General surgery following, appreciate recommendations  Labs in a.m.

## 2022-08-31 NOTE — OR SURGEON
Immediate Post- Operative Note        Findings: LOCULATED FLUID COLLECTION RIGHT MID ABDOMEN PERIUMBILICAL. MINIMAL PERIHEPATIC ASCITES      Procedure(s): US GUIDED PARACENTESIS    5F YUEH    EVACUATION: 150 ML CLEAR YELLOW FLUID. NO QUYEN PUS.     SPECIMEN SENT FOR C+S, GRAM STAIN, CELL COUNT AND ADDITIONAL STUDIES AS ORDERED      Estimated Blood Loss: Less than 1 ml        Complications: None            8/31/2022     11:13 AM     Chris Pete M.D.

## 2022-08-31 NOTE — PROGRESS NOTES
"Look  Date 22    PHYSICAL EXAMINATION:  Vital Signs: /79   Pulse 69   Temp 36.7 °C (98 °F) (Temporal)   Resp 16   Ht 1.702 m (5' 7\")   Wt 86 kg (189 lb 9.5 oz)   SpO2 100%     LABORATORY VALUES:     Surgical Progress Note    Author: Atilio Hutchins M.D.      Interval Events:  Drain volume has decreased significantly no longer purulent  Abscess cavity and pelvis appears to be completely collapsed  This drain is uncomfortable for the patient and should be removed  Patient looks better clinically today  White count remains elevated despite drainage of pelvic abscess  Further drainage is planned today for complex fluid collection in the left upper quadrant of the abdomen and possibly right upper quadrant of the abdomen  Afebrile  Pursuing aggressive IR drainage seems appropriate  Continue antibiotic therapy based on cultures      Review of Systems   Constitutional:  Negative for chills and fever.   Gastrointestinal:  Positive for abdominal pain.   Hemodynamics:  Temp (24hrs), Av.7 °C (98 °F), Min:36.3 °C (97.4 °F), Max:36.8 °C (98.3 °F)  Temperature: 36.7 °C (98 °F)  Pulse  Av.3  Min: 64  Max: 146   Blood Pressure: 125/79     Respiratory:    Respiration: 16, Pulse Oximetry: 100 %        RUL Breath Sounds: Clear, RML Breath Sounds: Diminished, RLL Breath Sounds: Diminished, MALU Breath Sounds: Clear, LLL Breath Sounds: Diminished  Neuro:  GCS       Fluids:    Intake/Output Summary (Last 24 hours) at 2022 1155  Last data filed at 2022 0800  Gross per 24 hour   Intake 480 ml   Output 15 ml   Net 465 ml     Weight: 86 kg (189 lb 9.5 oz)  Current Diet Order   Procedures    Diet Order Diet: Regular     Physical Exam  HENT:      Head: Normocephalic.   Cardiovascular:      Rate and Rhythm: Normal rate and regular rhythm.   Pulmonary:      Effort: Pulmonary effort is normal.      Breath sounds: Normal breath sounds.   Abdominal:      General: There is distension.      Tenderness: There is " abdominal tenderness.   Musculoskeletal:         General: Swelling present.   Skin:     Capillary Refill: Capillary refill takes less than 2 seconds.   Neurological:      General: No focal deficit present.      Mental Status: She is alert.   Psychiatric:         Mood and Affect: Mood normal.     Labs:  Recent Results (from the past 24 hour(s))   CBC WITH DIFFERENTIAL    Collection Time: 08/31/22  9:43 AM   Result Value Ref Range    WBC 18.8 (H) 4.8 - 10.8 K/uL    RBC 4.44 4.20 - 5.40 M/uL    Hemoglobin 12.0 12.0 - 16.0 g/dL    Hematocrit 36.5 (L) 37.0 - 47.0 %    MCV 82.2 81.4 - 97.8 fL    MCH 27.0 27.0 - 33.0 pg    MCHC 32.9 (L) 33.6 - 35.0 g/dL    RDW 46.3 35.9 - 50.0 fL    Platelet Count 456 (H) 164 - 446 K/uL    MPV 8.5 (L) 9.0 - 12.9 fL    Neutrophils-Polys 77.70 (H) 44.00 - 72.00 %    Lymphocytes 8.90 (L) 22.00 - 41.00 %    Monocytes 5.30 0.00 - 13.40 %    Eosinophils 1.80 0.00 - 6.90 %    Basophils 0.00 0.00 - 1.80 %    Nucleated RBC 0.00 /100 WBC    Neutrophils (Absolute) 14.61 (H) 2.00 - 7.15 K/uL    Lymphs (Absolute) 1.67 1.00 - 4.80 K/uL    Monos (Absolute) 1.00 (H) 0.00 - 0.85 K/uL    Eos (Absolute) 0.34 0.00 - 0.51 K/uL    Baso (Absolute) 0.00 0.00 - 0.12 K/uL    NRBC (Absolute) 0.00 K/uL    Anisocytosis 1+     Microcytosis 1+    Comp Metabolic Panel (CMP)    Collection Time: 08/31/22  9:43 AM   Result Value Ref Range    Sodium 136 135 - 145 mmol/L    Potassium 4.2 3.6 - 5.5 mmol/L    Chloride 101 96 - 112 mmol/L    Co2 25 20 - 33 mmol/L    Anion Gap 10.0 7.0 - 16.0    Glucose 103 (H) 65 - 99 mg/dL    Bun 3 (L) 8 - 22 mg/dL    Creatinine 0.56 0.50 - 1.40 mg/dL    Calcium 8.7 8.5 - 10.5 mg/dL    AST(SGOT) 16 12 - 45 U/L    ALT(SGPT) 10 2 - 50 U/L    Alkaline Phosphatase 60 30 - 99 U/L    Total Bilirubin 0.2 0.1 - 1.5 mg/dL    Albumin 2.9 (L) 3.2 - 4.9 g/dL    Total Protein 6.4 6.0 - 8.2 g/dL    Globulin 3.5 1.9 - 3.5 g/dL    A-G Ratio 0.8 g/dL   ESTIMATED GFR    Collection Time: 08/31/22  9:43 AM    Result Value Ref Range    GFR (CKD-EPI) 125 >60 mL/min/1.73 m 2   DIFFERENTIAL MANUAL    Collection Time: 08/31/22  9:43 AM   Result Value Ref Range    Metamyelocytes 2.70 %    Myelocytes 3.60 %    Manual Diff Status PERFORMED    PERIPHERAL SMEAR REVIEW    Collection Time: 08/31/22  9:43 AM   Result Value Ref Range    Peripheral Smear Review see below    PLATELET ESTIMATE    Collection Time: 08/31/22  9:43 AM   Result Value Ref Range    Plt Estimation Increased    MORPHOLOGY    Collection Time: 08/31/22  9:43 AM   Result Value Ref Range    RBC Morphology Present     Polychromia 1+     Poikilocytosis 1+     Ovalocytes 1+      Medical Decision Making, by Problem:  Active Hospital Problems    Diagnosis     Generalized abdominal pain [R10.84]      Priority: High    Ascites [R18.8]     Ileus (HCC) [K56.7]     Septic shock, ascites, possible pelvic abscess (HCC) [A41.9, R65.21]     Hepatosplenomegaly [R16.2]     Hyponatremia [E87.1]     Contraception management [Z30.9]      Plan:  Continue antibiotics and drain for now  Consider drainage of other abdominal fluid collections to make sure these are also not infected, ordered and pending    Quality Measures:  Quality-Core Measures    Discussed patient condition with Patient

## 2022-08-31 NOTE — CARE PLAN
The patient is Stable - Low risk of patient condition declining or worsening    Shift Goals  Clinical Goals: rid of ascities, pain control discharge  Patient Goals: pain control go home  Family Goals: n/a    Progress made toward(s) clinical / shift goals:  patient progressing with daily goals.    Patient is not progressing towards the following goals:

## 2022-08-31 NOTE — PROGRESS NOTES
Patient is alert and oriented x 4. Patient is now on low fiber,diet. Tolerating at this time. LR x 125, flushing well. + BM, + passing of gas. Still with tender, distended abdomen. CT showed hepatomegaly, small to moderate ascites. Plan to continue abx and consider drainage pf the abdominal fluid per notes of the surgery dept. Plan of care education provided and patient understood and agreed. Call light and personal items within reached. Kept safe and monitored.

## 2022-09-01 VITALS
OXYGEN SATURATION: 96 % | BODY MASS INDEX: 29.76 KG/M2 | TEMPERATURE: 97.5 F | HEART RATE: 70 BPM | HEIGHT: 67 IN | DIASTOLIC BLOOD PRESSURE: 76 MMHG | RESPIRATION RATE: 17 BRPM | SYSTOLIC BLOOD PRESSURE: 121 MMHG | WEIGHT: 189.6 LBS

## 2022-09-01 LAB
ALBUMIN SERPL BCP-MCNC: 2.7 G/DL (ref 3.2–4.9)
ALBUMIN/GLOB SERPL: 0.9 G/DL
ALP SERPL-CCNC: 53 U/L (ref 30–99)
ALT SERPL-CCNC: 7 U/L (ref 2–50)
ANION GAP SERPL CALC-SCNC: 10 MMOL/L (ref 7–16)
AST SERPL-CCNC: 18 U/L (ref 12–45)
BASOPHILS # BLD AUTO: 0 % (ref 0–1.8)
BASOPHILS # BLD: 0 K/UL (ref 0–0.12)
BILIRUB SERPL-MCNC: 0.2 MG/DL (ref 0.1–1.5)
BUN SERPL-MCNC: 3 MG/DL (ref 8–22)
CALCIUM SERPL-MCNC: 8 MG/DL (ref 8.5–10.5)
CHLORIDE SERPL-SCNC: 106 MMOL/L (ref 96–112)
CO2 SERPL-SCNC: 21 MMOL/L (ref 20–33)
CREAT SERPL-MCNC: 0.51 MG/DL (ref 0.5–1.4)
EOSINOPHIL # BLD AUTO: 0 K/UL (ref 0–0.51)
EOSINOPHIL NFR BLD: 0 % (ref 0–6.9)
ERYTHROCYTE [DISTWIDTH] IN BLOOD BY AUTOMATED COUNT: 45.1 FL (ref 35.9–50)
GFR SERPLBLD CREATININE-BSD FMLA CKD-EPI: 128 ML/MIN/1.73 M 2
GLOBULIN SER CALC-MCNC: 3 G/DL (ref 1.9–3.5)
GLUCOSE SERPL-MCNC: 92 MG/DL (ref 65–99)
HCT VFR BLD AUTO: 31.9 % (ref 37–47)
HGB BLD-MCNC: 10.6 G/DL (ref 12–16)
LYMPHOCYTES # BLD AUTO: 1.91 K/UL (ref 1–4.8)
LYMPHOCYTES NFR BLD: 12.9 % (ref 22–41)
MANUAL DIFF BLD: NORMAL
MCH RBC QN AUTO: 26.9 PG (ref 27–33)
MCHC RBC AUTO-ENTMCNC: 33.2 G/DL (ref 33.6–35)
MCV RBC AUTO: 81 FL (ref 81.4–97.8)
METAMYELOCYTES NFR BLD MANUAL: 1.7 %
MONOCYTES # BLD AUTO: 0.38 K/UL (ref 0–0.85)
MONOCYTES NFR BLD AUTO: 2.6 % (ref 0–13.4)
MORPHOLOGY BLD-IMP: NORMAL
MYELOCYTES NFR BLD MANUAL: 0.9 %
NEUTROPHILS # BLD AUTO: 12.12 K/UL (ref 2–7.15)
NEUTROPHILS NFR BLD: 81.9 % (ref 44–72)
NRBC # BLD AUTO: 0 K/UL
NRBC BLD-RTO: 0 /100 WBC
NUCLEAR IGG SER QL IA: NORMAL
PLATELET # BLD AUTO: 427 K/UL (ref 164–446)
PLATELET BLD QL SMEAR: NORMAL
PMV BLD AUTO: 8.4 FL (ref 9–12.9)
POTASSIUM SERPL-SCNC: 3.9 MMOL/L (ref 3.6–5.5)
PROT SERPL-MCNC: 5.7 G/DL (ref 6–8.2)
RBC # BLD AUTO: 3.94 M/UL (ref 4.2–5.4)
RBC BLD AUTO: NORMAL
SODIUM SERPL-SCNC: 137 MMOL/L (ref 135–145)
WBC # BLD AUTO: 14.8 K/UL (ref 4.8–10.8)

## 2022-09-01 PROCEDURE — 700102 HCHG RX REV CODE 250 W/ 637 OVERRIDE(OP): Performed by: STUDENT IN AN ORGANIZED HEALTH CARE EDUCATION/TRAINING PROGRAM

## 2022-09-01 PROCEDURE — 700105 HCHG RX REV CODE 258: Performed by: HOSPITALIST

## 2022-09-01 PROCEDURE — 99233 SBSQ HOSP IP/OBS HIGH 50: CPT | Performed by: SURGERY

## 2022-09-01 PROCEDURE — 80053 COMPREHEN METABOLIC PANEL: CPT

## 2022-09-01 PROCEDURE — 99239 HOSP IP/OBS DSCHRG MGMT >30: CPT | Performed by: STUDENT IN AN ORGANIZED HEALTH CARE EDUCATION/TRAINING PROGRAM

## 2022-09-01 PROCEDURE — 700102 HCHG RX REV CODE 250 W/ 637 OVERRIDE(OP): Performed by: INTERNAL MEDICINE

## 2022-09-01 PROCEDURE — A9270 NON-COVERED ITEM OR SERVICE: HCPCS | Performed by: INTERNAL MEDICINE

## 2022-09-01 PROCEDURE — A9270 NON-COVERED ITEM OR SERVICE: HCPCS | Performed by: STUDENT IN AN ORGANIZED HEALTH CARE EDUCATION/TRAINING PROGRAM

## 2022-09-01 PROCEDURE — 700101 HCHG RX REV CODE 250: Performed by: STUDENT IN AN ORGANIZED HEALTH CARE EDUCATION/TRAINING PROGRAM

## 2022-09-01 PROCEDURE — 85025 COMPLETE CBC W/AUTO DIFF WBC: CPT

## 2022-09-01 PROCEDURE — 36415 COLL VENOUS BLD VENIPUNCTURE: CPT

## 2022-09-01 PROCEDURE — 85007 BL SMEAR W/DIFF WBC COUNT: CPT

## 2022-09-01 PROCEDURE — 700111 HCHG RX REV CODE 636 W/ 250 OVERRIDE (IP): Performed by: STUDENT IN AN ORGANIZED HEALTH CARE EDUCATION/TRAINING PROGRAM

## 2022-09-01 RX ORDER — FAMOTIDINE 20 MG/1
20 TABLET, FILM COATED ORAL 2 TIMES DAILY
Qty: 60 TABLET | Refills: 0 | Status: SHIPPED | OUTPATIENT
Start: 2022-09-01 | End: 2022-09-01 | Stop reason: SDUPTHER

## 2022-09-01 RX ORDER — METRONIDAZOLE 500 MG/1
500 TABLET ORAL EVERY 8 HOURS
Qty: 39 TABLET | Refills: 0 | Status: SHIPPED | OUTPATIENT
Start: 2022-09-01 | End: 2022-09-07 | Stop reason: SDUPTHER

## 2022-09-01 RX ORDER — AMOXICILLIN AND CLAVULANATE POTASSIUM 875; 125 MG/1; MG/1
1 TABLET, FILM COATED ORAL 2 TIMES DAILY
Qty: 26 TABLET | Refills: 0 | Status: SHIPPED | OUTPATIENT
Start: 2022-09-01 | End: 2022-09-07 | Stop reason: SDUPTHER

## 2022-09-01 RX ORDER — FAMOTIDINE 20 MG/1
20 TABLET, FILM COATED ORAL 2 TIMES DAILY
Qty: 60 TABLET | Refills: 0 | Status: SHIPPED | OUTPATIENT
Start: 2022-09-01 | End: 2022-09-07 | Stop reason: SDUPTHER

## 2022-09-01 RX ORDER — AMOXICILLIN AND CLAVULANATE POTASSIUM 875; 125 MG/1; MG/1
1 TABLET, FILM COATED ORAL 2 TIMES DAILY
Qty: 26 TABLET | Refills: 0 | Status: SHIPPED | OUTPATIENT
Start: 2022-09-01 | End: 2022-09-01 | Stop reason: SDUPTHER

## 2022-09-01 RX ORDER — ACETAMINOPHEN 500 MG
1000 TABLET ORAL 3 TIMES DAILY
Qty: 30 TABLET | Refills: 0 | Status: SHIPPED | OUTPATIENT
Start: 2022-09-01 | End: 2023-04-07

## 2022-09-01 RX ORDER — METRONIDAZOLE 500 MG/1
500 TABLET ORAL EVERY 8 HOURS
Qty: 39 TABLET | Refills: 0 | Status: SHIPPED | OUTPATIENT
Start: 2022-09-01 | End: 2022-09-01 | Stop reason: SDUPTHER

## 2022-09-01 RX ORDER — ACETAMINOPHEN 500 MG
1000 TABLET ORAL 3 TIMES DAILY
Qty: 30 TABLET | Refills: 0 | Status: SHIPPED | OUTPATIENT
Start: 2022-09-01 | End: 2022-09-01 | Stop reason: SDUPTHER

## 2022-09-01 RX ADMIN — OXYCODONE HYDROCHLORIDE AND ACETAMINOPHEN 500 MG: 500 TABLET ORAL at 06:05

## 2022-09-01 RX ADMIN — SODIUM CHLORIDE, POTASSIUM CHLORIDE, SODIUM LACTATE AND CALCIUM CHLORIDE: 600; 310; 30; 20 INJECTION, SOLUTION INTRAVENOUS at 06:04

## 2022-09-01 RX ADMIN — CEFTRIAXONE SODIUM 2 G: 10 INJECTION, POWDER, FOR SOLUTION INTRAVENOUS at 10:46

## 2022-09-01 RX ADMIN — METRONIDAZOLE 500 MG: 5 INJECTION, SOLUTION INTRAVENOUS at 06:04

## 2022-09-01 RX ADMIN — DOCUSATE SODIUM 50 MG AND SENNOSIDES 8.6 MG 2 TABLET: 8.6; 5 TABLET, FILM COATED ORAL at 06:05

## 2022-09-01 RX ADMIN — FAMOTIDINE 20 MG: 20 TABLET, FILM COATED ORAL at 06:05

## 2022-09-01 RX ADMIN — ACETAMINOPHEN 1000 MG: 500 TABLET ORAL at 06:13

## 2022-09-01 ASSESSMENT — ENCOUNTER SYMPTOMS
CHILLS: 0
ABDOMINAL PAIN: 1
FEVER: 0

## 2022-09-01 ASSESSMENT — PAIN DESCRIPTION - PAIN TYPE: TYPE: ACUTE PAIN

## 2022-09-01 NOTE — PROGRESS NOTES
IV removed.  All questions answered. Follow up discussed. Patient discharged AMA with . Patient has all personal belongings.

## 2022-09-01 NOTE — DISCHARGE SUMMARY
Discharge Summary    CHIEF COMPLAINT ON ADMISSION  Chief Complaint   Patient presents with    Abdominal Pain     Pt states she's been having abdominal pain since 8/17/2022. Pain has been progressively getting worse since then.    Vaginal Bleeding     Bleeding since Saturday, unsure if this is her menstrual cycle or not.      Fever     Pt states she had a fever of 100.8 at home for which she took ibuprofen.     N/V       Reason for Admission  abd pain     Admission Date  8/22/2022    CODE STATUS  Full Code    HPI & HOSPITAL COURSE  32-year-old female with a past medical history of  in vitro fertilization complicated by tubal and intrauterine pregnancy status post salpingectomy and acute appendicitis noted intraoperatively status post appendectomy as well as dilation and curettaged as pregnancy was nonviable was admitted to the ICU on 8/22/2022 for septic shock secondary to pelvic abscess noted on abdominal/pelvic CT imaging as well as new hepatosplenomegaly and ascites. She was started on broad-spectrum antibiotics and receive fluid resuscitation. She was ultimately transferred to the medical floor and underwent CT-guided pelvic abscess drain placement on 8/26/2022.  Drain cultures growing E. Coli, Bacteroides, Eggerthella for which patient was ultimately transitioned to a Bactrim antibiotic regimen.  Patient underwent follow-up abdominal CT which showed marked improvement with abscess for which drain was ultimately removed but patient noted for moderate ascites.  She underwent paracentesis for which WBC was 79 and Polys were 96.  Leukocytosis was persistently after which, patient was changed to a Rocephin/metronidazole IV antibiotic regimen. She was noted for left upper quadrant fluid collection on follow up abdominal ultrasound imaging which was discussed with interventional radiology and recommended surgical washout.  Patient presenting with marked improvement with IV antibiotics and white blood cell count  trending down to 14 from 20.  Unfortunately, patient grew anxious throughout hospital stay and constantly wanted to be discharged to go home.  After multiple interactions with the patient by myself and general surgery regarding the importance of staying in the hospital to complete IV antibiotics and monitor white count improvement, patient politely declined and opted to leave AGAINST MEDICAL ADVICE.  Patient AAO X4 and understands that there is a high possibility that her infection can return while on p.o. antibiotics.  Regardless of the above, patient opted to leave AGAINST MEDICAL ADVICE.  Metronidazole and Augmentin antibiotics were sent to pharmacy and patient was instructed to follow-up with her primary care provider as soon as possible.  I discussed with the patient that if anything were to worsen or she change her mind and she wants to come back we will be more than happy to receive her in the emergency department and re-admit her to continue IV antibiotics and overall care.  Patient voiced understanding, stated that she will call her primary care provider as soon as possible and voices appreciation for her care while inpatient. Patient proceeded to leave AGAINST MEDICAL ADVICE.    Therefore, she is discharged in guarded and stable condition against medcial advice.    The patient met 2-midnight criteria for an inpatient stay at the time of discharge.    Discharge Date  9/1/2022    FOLLOW UP ITEMS POST DISCHARGE  Primary care provider follow-up was also discharge care    DISCHARGE DIAGNOSES  Principal Problem:    Intra-abdominal abscess (HCC) POA: Unknown  Active Problems:    Ascites POA: No    Generalized abdominal pain POA: Yes    Hepatosplenomegaly POA: Yes    Hyponatremia POA: Yes    Contraception management POA: Yes    Ileus (HCC) POA: Unknown    Septic shock, ascites, possible pelvic abscess (HCC) POA: Yes  Resolved Problems:    * No resolved hospital problems. *      FOLLOW UP  No future  "appointments.  No follow-up provider specified.    MEDICATIONS ON DISCHARGE     Medication List        START taking these medications        Instructions   acetaminophen 500 MG Tabs  Commonly known as: TYLENOL   Take 2 Tablets by mouth in the morning, at noon, and at bedtime.  Dose: 1,000 mg     amoxicillin-clavulanate 875-125 MG Tabs  Commonly known as: AUGMENTIN   Take 1 Tablet by mouth 2 times a day for 13 days.  Dose: 1 Tablet     famotidine 20 MG Tabs  Commonly known as: PEPCID   Take 1 Tablet by mouth 2 times a day.  Dose: 20 mg     metroNIDAZOLE 500 MG Tabs  Commonly known as: FLAGYL   Take 1 Tablet by mouth every 8 hours for 13 days.  Dose: 500 mg            STOP taking these medications      ciprofloxacin 500 MG Tabs  Commonly known as: CIPRO     IBUPROFEN PO     norethindrone-ethinyl estradiol 1-20 MG-MCG per tablet  Commonly known as: MICROGESTIN 1/20              Allergies  Allergies   Allergen Reactions    Onion Hives and Swelling     Patient states \"throat porter closes\"       DIET  Orders Placed This Encounter   Procedures    Diet NPO Restrict to: Strict (Patient will go to surgery depending on WBC results from 0300 9/1/22 draw.)     Standing Status:   Standing     Number of Occurrences:   1     Order Specific Question:   Diet NPO Restrict to:     Answer:   Strict [1]     Comments:   Patient will go to surgery depending on WBC results from 0300 9/1/22 draw.       ACTIVITY  As tolerated.  Weight bearing as tolerated    CONSULTATIONS  Gynecology  General surgery  Critical care medicine    PROCEDURES  CT-guided abdominal abscess drain placement  Ultrasound-guided paracentesis    LABORATORY  Lab Results   Component Value Date    SODIUM 137 09/01/2022    POTASSIUM 3.9 09/01/2022    CHLORIDE 106 09/01/2022    CO2 21 09/01/2022    GLUCOSE 92 09/01/2022    BUN 3 (L) 09/01/2022    CREATININE 0.51 09/01/2022    CREATININE 0.73 11/24/2012        Lab Results   Component Value Date    WBC 14.8 (H) 09/01/2022    WBC " 7.3 11/24/2012    HEMOGLOBIN 10.6 (L) 09/01/2022    HEMATOCRIT 31.9 (L) 09/01/2022    PLATELETCT 427 09/01/2022        Total time of the discharge process exceeds 36 minutes.

## 2022-09-01 NOTE — CARE PLAN
The patient is Stable - Low risk of patient condition declining or worsening    Shift Goals  Clinical Goals: decrease WBC  Patient Goals: go home  Family Goals: na      Problem: Pain - Standard  Goal: Alleviation of pain or a reduction in pain to the patient’s comfort goal  Outcome: Progressing     Problem: Knowledge Deficit - Standard  Goal: Patient and family/care givers will demonstrate understanding of plan of care, disease process/condition, diagnostic tests and medications  Outcome: Progressing       Progress made toward(s) clinical / shift goals:  Patient declines narcotics and tolerating pain wit scheduled tylenol.  States want to go home.    Patient is not progressing towards the following goals:

## 2022-09-01 NOTE — PROGRESS NOTES
Surgical Progress Note    Author: Atilio Hutchins M.D.      Interval Events:  Patient did undergo drainage of a mid collection.  This was done with ultrasound guidance.  There was a more complex left lower quadrant fluid collection which was not drained.  Fluid that was drained was hypocellular and had a negative Gram stain most consistent with ascitic fluid  White count actually decreased this morning  Patient feels considerably better.  Abdominal examination improved less distended less tender no peritoneal findings  Continue antibiotic therapy based on cultures  My recommendation would be to continue IV antibiotics and trend white count in light of this positive clinical response  Exploratory laparotomy and washout not indicated today  There is a residual left lower quadrant fluid collection which could be ascitic fluid also  When the patient demonstrates a positive trend in white blood cell count transition to oral antibiotics would be appropriate with at least 10 more days of therapy  Patient is quite anxious for discharge which I would advise against  I discussed this with Dr. Oneill and will endorse what ever clinical decision he works out best with the patient      Review of Systems   Constitutional:  Negative for chills and fever.   Gastrointestinal:  Positive for abdominal pain.   Hemodynamics:  Temp (24hrs), Av.6 °C (97.8 °F), Min:36.3 °C (97.3 °F), Max:36.8 °C (98.3 °F)  Temperature: 36.4 °C (97.5 °F)  Pulse  Av  Min: 64  Max: 146   Blood Pressure: 121/76     Respiratory:    Respiration: 17, Pulse Oximetry: 96 %        RUL Breath Sounds: Clear, RML Breath Sounds: Diminished, RLL Breath Sounds: Diminished, MALU Breath Sounds: Clear, LLL Breath Sounds: Diminished  Neuro:  GCS       Fluids:    Intake/Output Summary (Last 24 hours) at 2022 1155  Last data filed at 2022 0800  Gross per 24 hour   Intake 480 ml   Output 15 ml   Net 465 ml        Current Diet Order   Procedures    Diet  NPO Restrict to: Strict (Patient will go to surgery depending on WBC results from 0300 9/1/22 draw.)     Physical Exam  HENT:      Head: Normocephalic.   Cardiovascular:      Rate and Rhythm: Normal rate and regular rhythm.   Pulmonary:      Effort: Pulmonary effort is normal.      Breath sounds: Normal breath sounds.   Abdominal:      General: There is distension.      Tenderness: There is abdominal tenderness.   Musculoskeletal:         General: Swelling present.   Skin:     Capillary Refill: Capillary refill takes less than 2 seconds.   Neurological:      General: No focal deficit present.      Mental Status: She is alert.   Psychiatric:         Mood and Affect: Mood normal.     Labs:  Recent Results (from the past 24 hour(s))   Fluid Cell Count w/Diff    Collection Time: 08/31/22 11:15 AM   Result Value Ref Range    Fluid Type Peritoneal     Color-Body Fluid Yellow     Character-Body Fluid Hazy     Total RBC Count <2000 cells/uL    Total WBC 79 cells/uL    Polys 96 %    Lymphs 2 %    Mononuclear Cells - Fluid 2 %    Comments see below    Fluid Total Protein    Collection Time: 08/31/22 11:15 AM   Result Value Ref Range    Fluid Type Peritoneal     Total Protein Fluid 4.1 g/dL   FLUID ALBUMIN    Collection Time: 08/31/22 11:15 AM   Result Value Ref Range    Albumin 2.1 g/dL   FLUID CULTURE W/GRAM STAIN    Collection Time: 08/31/22 11:15 AM    Specimen: Body Fluid   Result Value Ref Range    Significant Indicator NEG     Source BF     Site Peritoneal Fluid     Culture Result -     Gram Stain Result Rare WBCs.  No organisms seen.      GRAM STAIN    Collection Time: 08/31/22 11:15 AM    Specimen: Body Fluid   Result Value Ref Range    Significant Indicator .     Source BF     Site Peritoneal Fluid     Gram Stain Result Rare WBCs.  No organisms seen.      CBC WITH DIFFERENTIAL    Collection Time: 09/01/22  4:38 AM   Result Value Ref Range    WBC 14.8 (H) 4.8 - 10.8 K/uL    RBC 3.94 (L) 4.20 - 5.40 M/uL    Hemoglobin  10.6 (L) 12.0 - 16.0 g/dL    Hematocrit 31.9 (L) 37.0 - 47.0 %    MCV 81.0 (L) 81.4 - 97.8 fL    MCH 26.9 (L) 27.0 - 33.0 pg    MCHC 33.2 (L) 33.6 - 35.0 g/dL    RDW 45.1 35.9 - 50.0 fL    Platelet Count 427 164 - 446 K/uL    MPV 8.4 (L) 9.0 - 12.9 fL    Neutrophils-Polys 81.90 (H) 44.00 - 72.00 %    Lymphocytes 12.90 (L) 22.00 - 41.00 %    Monocytes 2.60 0.00 - 13.40 %    Eosinophils 0.00 0.00 - 6.90 %    Basophils 0.00 0.00 - 1.80 %    Nucleated RBC 0.00 /100 WBC    Neutrophils (Absolute) 12.12 (H) 2.00 - 7.15 K/uL    Lymphs (Absolute) 1.91 1.00 - 4.80 K/uL    Monos (Absolute) 0.38 0.00 - 0.85 K/uL    Eos (Absolute) 0.00 0.00 - 0.51 K/uL    Baso (Absolute) 0.00 0.00 - 0.12 K/uL    NRBC (Absolute) 0.00 K/uL   Comp Metabolic Panel (CMP)    Collection Time: 09/01/22  4:38 AM   Result Value Ref Range    Sodium 137 135 - 145 mmol/L    Potassium 3.9 3.6 - 5.5 mmol/L    Chloride 106 96 - 112 mmol/L    Co2 21 20 - 33 mmol/L    Anion Gap 10.0 7.0 - 16.0    Glucose 92 65 - 99 mg/dL    Bun 3 (L) 8 - 22 mg/dL    Creatinine 0.51 0.50 - 1.40 mg/dL    Calcium 8.0 (L) 8.5 - 10.5 mg/dL    AST(SGOT) 18 12 - 45 U/L    ALT(SGPT) 7 2 - 50 U/L    Alkaline Phosphatase 53 30 - 99 U/L    Total Bilirubin 0.2 0.1 - 1.5 mg/dL    Albumin 2.7 (L) 3.2 - 4.9 g/dL    Total Protein 5.7 (L) 6.0 - 8.2 g/dL    Globulin 3.0 1.9 - 3.5 g/dL    A-G Ratio 0.9 g/dL   ESTIMATED GFR    Collection Time: 09/01/22  4:38 AM   Result Value Ref Range    GFR (CKD-EPI) 128 >60 mL/min/1.73 m 2   DIFFERENTIAL MANUAL    Collection Time: 09/01/22  4:38 AM   Result Value Ref Range    Metamyelocytes 1.70 %    Myelocytes 0.90 %    Manual Diff Status PERFORMED    PERIPHERAL SMEAR REVIEW    Collection Time: 09/01/22  4:38 AM   Result Value Ref Range    Peripheral Smear Review see below    PLATELET ESTIMATE    Collection Time: 09/01/22  4:38 AM   Result Value Ref Range    Plt Estimation Normal    MORPHOLOGY    Collection Time: 09/01/22  4:38 AM   Result Value Ref Range    RBC  Morphology Normal      Medical Decision Making, by Problem:  Active Hospital Problems    Diagnosis     Generalized abdominal pain [R10.84]      Priority: High    Intra-abdominal abscess (HCC) [K65.1]     Ascites [R18.8]     Ileus (HCC) [K56.7]     Septic shock, ascites, possible pelvic abscess (HCC) [A41.9, R65.21]     Hepatosplenomegaly [R16.2]     Hyponatremia [E87.1]     Contraception management [Z30.9]      Plan:  Perineal drain has been removed  Patient is clinically improving  Discharge is being negotiated  Certainly if patient fails this clinical therapeutic plan we will be available for follow-up    Quality Measures:  Quality-Core Measures    Discussed patient condition with Patient

## 2022-09-01 NOTE — PROGRESS NOTES
Assumed care of patientat 1900.  Alert and oriented, medicated per MAR.  Tolerating ordered diet well.  All needs addressed at this moment.  Call light within reach, bed locked and in low position, bed alarm on, fall education provided.

## 2022-09-02 DIAGNOSIS — E87.1 HYPONATREMIA: ICD-10-CM

## 2022-09-02 DIAGNOSIS — K92.1 HEMATOCHEZIA: ICD-10-CM

## 2022-09-03 ENCOUNTER — HOSPITAL ENCOUNTER (OUTPATIENT)
Dept: LAB | Facility: MEDICAL CENTER | Age: 32
End: 2022-09-03
Attending: NURSE PRACTITIONER
Payer: COMMERCIAL

## 2022-09-03 DIAGNOSIS — K92.1 HEMATOCHEZIA: ICD-10-CM

## 2022-09-03 DIAGNOSIS — E87.1 HYPONATREMIA: ICD-10-CM

## 2022-09-03 LAB
ALBUMIN SERPL BCP-MCNC: 3.3 G/DL (ref 3.2–4.9)
ALBUMIN/GLOB SERPL: 1 G/DL
ALP SERPL-CCNC: 61 U/L (ref 30–99)
ALT SERPL-CCNC: 5 U/L (ref 2–50)
ANION GAP SERPL CALC-SCNC: 11 MMOL/L (ref 7–16)
AST SERPL-CCNC: 21 U/L (ref 12–45)
BACTERIA FLD AEROBE CULT: NORMAL
BASOPHILS # BLD AUTO: 0.5 % (ref 0–1.8)
BASOPHILS # BLD: 0.06 K/UL (ref 0–0.12)
BILIRUB SERPL-MCNC: 0.3 MG/DL (ref 0.1–1.5)
BUN SERPL-MCNC: 5 MG/DL (ref 8–22)
CALCIUM SERPL-MCNC: 8.7 MG/DL (ref 8.5–10.5)
CHLORIDE SERPL-SCNC: 105 MMOL/L (ref 96–112)
CO2 SERPL-SCNC: 23 MMOL/L (ref 20–33)
CREAT SERPL-MCNC: 0.59 MG/DL (ref 0.5–1.4)
EOSINOPHIL # BLD AUTO: 0.09 K/UL (ref 0–0.51)
EOSINOPHIL NFR BLD: 0.7 % (ref 0–6.9)
ERYTHROCYTE [DISTWIDTH] IN BLOOD BY AUTOMATED COUNT: 49.7 FL (ref 35.9–50)
GFR SERPLBLD CREATININE-BSD FMLA CKD-EPI: 124 ML/MIN/1.73 M 2
GLOBULIN SER CALC-MCNC: 3.4 G/DL (ref 1.9–3.5)
GLUCOSE SERPL-MCNC: 88 MG/DL (ref 65–99)
GRAM STN SPEC: NORMAL
HCT VFR BLD AUTO: 36.2 % (ref 37–47)
HGB BLD-MCNC: 11.5 G/DL (ref 12–16)
IMM GRANULOCYTES # BLD AUTO: 0.59 K/UL (ref 0–0.11)
IMM GRANULOCYTES NFR BLD AUTO: 4.7 % (ref 0–0.9)
LYMPHOCYTES # BLD AUTO: 2.59 K/UL (ref 1–4.8)
LYMPHOCYTES NFR BLD: 20.6 % (ref 22–41)
MCH RBC QN AUTO: 26.7 PG (ref 27–33)
MCHC RBC AUTO-ENTMCNC: 31.8 G/DL (ref 33.6–35)
MCV RBC AUTO: 84.2 FL (ref 81.4–97.8)
MONOCYTES # BLD AUTO: 0.68 K/UL (ref 0–0.85)
MONOCYTES NFR BLD AUTO: 5.4 % (ref 0–13.4)
NEUTROPHILS # BLD AUTO: 8.55 K/UL (ref 2–7.15)
NEUTROPHILS NFR BLD: 68.1 % (ref 44–72)
NRBC # BLD AUTO: 0 K/UL
NRBC BLD-RTO: 0 /100 WBC
PLATELET # BLD AUTO: 568 K/UL (ref 164–446)
PMV BLD AUTO: 8.7 FL (ref 9–12.9)
POTASSIUM SERPL-SCNC: 4.1 MMOL/L (ref 3.6–5.5)
PROT SERPL-MCNC: 6.7 G/DL (ref 6–8.2)
RBC # BLD AUTO: 4.3 M/UL (ref 4.2–5.4)
SIGNIFICANT IND 70042: NORMAL
SITE SITE: NORMAL
SODIUM SERPL-SCNC: 139 MMOL/L (ref 135–145)
SOURCE SOURCE: NORMAL
WBC # BLD AUTO: 12.6 K/UL (ref 4.8–10.8)

## 2022-09-03 PROCEDURE — 85025 COMPLETE CBC W/AUTO DIFF WBC: CPT

## 2022-09-03 PROCEDURE — 36415 COLL VENOUS BLD VENIPUNCTURE: CPT

## 2022-09-03 PROCEDURE — 80053 COMPREHEN METABOLIC PANEL: CPT

## 2022-09-06 ENCOUNTER — TELEPHONE (OUTPATIENT)
Dept: MEDICAL GROUP | Facility: MEDICAL CENTER | Age: 32
End: 2022-09-06
Payer: COMMERCIAL

## 2022-09-06 DIAGNOSIS — E87.1 HYPONATREMIA: ICD-10-CM

## 2022-09-06 DIAGNOSIS — K92.1 HEMATOCHEZIA: ICD-10-CM

## 2022-09-06 DIAGNOSIS — Z13.21 ENCOUNTER FOR VITAMIN DEFICIENCY SCREENING: ICD-10-CM

## 2022-09-06 DIAGNOSIS — Z13.220 SCREENING FOR HYPERLIPIDEMIA: ICD-10-CM

## 2022-09-06 NOTE — TELEPHONE ENCOUNTER
1. Caller Name: Brunilda                         Call Back Number: [phone] (home)        How would the patient prefer to be contacted with a response: Phone call OK to leave a detailed message    Pt is requesting labs to recheck her WBC levels.

## 2022-09-07 ENCOUNTER — OFFICE VISIT (OUTPATIENT)
Dept: MEDICAL GROUP | Facility: MEDICAL CENTER | Age: 32
End: 2022-09-07
Payer: COMMERCIAL

## 2022-09-07 VITALS
WEIGHT: 179 LBS | DIASTOLIC BLOOD PRESSURE: 64 MMHG | OXYGEN SATURATION: 98 % | HEART RATE: 81 BPM | BODY MASS INDEX: 28.09 KG/M2 | HEIGHT: 67 IN | SYSTOLIC BLOOD PRESSURE: 120 MMHG | TEMPERATURE: 97.9 F

## 2022-09-07 DIAGNOSIS — K65.9 ABDOMINAL INFECTION (HCC): ICD-10-CM

## 2022-09-07 DIAGNOSIS — R16.2 HEPATOSPLENOMEGALY: ICD-10-CM

## 2022-09-07 DIAGNOSIS — K65.1 INTRA-ABDOMINAL ABSCESS (HCC): ICD-10-CM

## 2022-09-07 PROCEDURE — 99214 OFFICE O/P EST MOD 30 MIN: CPT | Performed by: NURSE PRACTITIONER

## 2022-09-07 RX ORDER — AMOXICILLIN AND CLAVULANATE POTASSIUM 875; 125 MG/1; MG/1
1 TABLET, FILM COATED ORAL 2 TIMES DAILY
Qty: 26 TABLET | Refills: 0 | Status: SHIPPED | OUTPATIENT
Start: 2022-09-07 | End: 2022-10-02 | Stop reason: SDUPTHER

## 2022-09-07 RX ORDER — FAMOTIDINE 20 MG/1
20 TABLET, FILM COATED ORAL 2 TIMES DAILY
Qty: 60 TABLET | Refills: 0 | Status: SHIPPED | OUTPATIENT
Start: 2022-09-07 | End: 2023-04-07

## 2022-09-07 RX ORDER — METRONIDAZOLE 500 MG/1
500 TABLET ORAL EVERY 8 HOURS
Qty: 39 TABLET | Refills: 0 | Status: SHIPPED | OUTPATIENT
Start: 2022-09-07 | End: 2022-09-20

## 2022-09-07 ASSESSMENT — FIBROSIS 4 INDEX: FIB4 SCORE: 0.5

## 2022-09-07 NOTE — PROGRESS NOTES
Subjective:     Brunilda Webber is a 32 y.o. female who presents with abd abscess.    HPI:   Seen in f/u after hosp for abd abscess.  She has a ectopic and uterine pg.  The ectopic irritated the appendix and caused rupture.  Then several days later she had miscarriage.  She is followed by IVF.  Will try to get pg when well.  Last paracentesis was 8/30/22.  She is still on antibx.  Still feels that there is fluid in the abd.    Recent lab showed CMP, GFR wnl.  CBC shows wbc improving and down to 12.  Plt are up.  Neutrophils are normal now but lymphs still sl abn.    No fever,chills or sweating.  Having liquid stools but that is normal for her.    Hasn't seen surgeon yet except for on in hosp that did the paracentesis on 8/30/22.     Patient Active Problem List    Diagnosis Date Noted    Intra-abdominal abscess (HCC) 08/31/2022    Ascites 08/30/2022    Ileus (HCC) 08/29/2022    Septic shock, ascites, possible pelvic abscess (HCC) 08/22/2022    Generalized abdominal pain 08/22/2022    Hepatosplenomegaly 08/22/2022    Hyponatremia 08/22/2022    Contraception management 08/22/2022    Appendicitis with peritonitis 07/27/2022    Postoperative pain 03/19/2021    Altered taste 08/21/2019    Weight gain finding 08/21/2019    Food allergy 08/21/2019    Bowel habit changes 08/21/2019    Hematochezia 08/21/2019    Vitamin D deficiency        Current medicines (including changes today)  Current Outpatient Medications   Medication Sig Dispense Refill    famotidine (PEPCID) 20 MG Tab Take 1 Tablet by mouth 2 times a day. 60 Tablet 0    amoxicillin-clavulanate (AUGMENTIN) 875-125 MG Tab Take 1 Tablet by mouth 2 times a day for 13 days. 26 Tablet 0    metroNIDAZOLE (FLAGYL) 500 MG Tab Take 1 Tablet by mouth every 8 hours for 13 days. 39 Tablet 0    acetaminophen (TYLENOL) 500 MG Tab Take 2 Tablets by mouth in the morning, at noon, and at bedtime. 30 Tablet 0     No current facility-administered medications for this visit.  "      Allergies   Allergen Reactions    Onion Hives and Swelling     Patient states \"throat porter closes\"       ROS  Constitutional: Negative. Negative for fever, chills, weight loss, malaise/fatigue and diaphoresis.   HENT: Negative. Negative for hearing loss, ear pain, nosebleeds, congestion, sore throat, neck pain, tinnitus and ear discharge.   Respiratory: Negative. Negative for cough, hemoptysis, sputum production, shortness of breath, wheezing and stridor.   Cardiovascular: Negative. Negative for chest pain, palpitations, orthopnea, claudication, leg swelling and PND.   Gastrointestinal: Denies nausea, vomiting, constipation, heartburn, melena or hematochezia.  Genitourinary: Denies dysuria, hematuria, urinary incontinence, frequency or urgency.        Objective:     /64   Pulse 81   Temp 36.6 °C (97.9 °F) (Temporal)   Ht 1.702 m (5' 7\")   Wt 81.2 kg (179 lb)   SpO2 98%  Body mass index is 28.04 kg/m².    Physical Exam:  Physical Exam   Vitals reviewed.  Constitutional: oriented to person, place, and time. appears well-developed and well-nourished. No distress.   Cardiovascular: Normal rate, regular rhythm, normal heart sounds and intact distal pulses.  Exam reveals no gallop and no friction rub.  No murmur heard.  No carotid bruits.   Pulmonary/Chest: Effort normal and breath sounds normal. No stridor. No respiratory distress. no wheezes or rales. exhibits no tenderness.   Musculoskeletal: Normal range of motion. exhibits no edema. grace pedal pulses 2+.  Lymphadenopathy: no cervical or supraclavicular adenopathy.   Abd:  No CVAT,  Soft,  Bs noted in all quadrants.  No HSM.  Mild grace lower quadrant abdominal tenderness.  Firm area noted in suprapubic area.    Neurological: alert and oriented to person, place, and time. exhibits normal muscle tone. Coordination normal.   Skin: Skin is warm and dry. no diaphoresis.   Psychiatric: normal mood and affect. behavior is normal.     Assessment and Plan: "     The following treatment plan was discussed:    1. Intra-abdominal abscess (HCC)  famotidine (PEPCID) 20 MG Tab    amoxicillin-clavulanate (AUGMENTIN) 875-125 MG Tab    metroNIDAZOLE (FLAGYL) 500 MG Tab    CBC WITH DIFFERENTIAL    Comp Metabolic Panel    US-RUQ    Referral to General Surgery    strict ER precautions for fever or worse sx.  refer surgery for monitoring.  repeat cbc weekly until normal.  RUQ us this week.  repeat CMP this week.       2. Hepatosplenomegaly  famotidine (PEPCID) 20 MG Tab    amoxicillin-clavulanate (AUGMENTIN) 875-125 MG Tab    metroNIDAZOLE (FLAGYL) 500 MG Tab    CBC WITH DIFFERENTIAL    Comp Metabolic Panel    US-RUQ    Referral to General Surgery    RF flagyl, pepcid and augmentin to continue until wbc wnl.              Followup: Return in about 4 weeks (around 10/5/2022).

## 2022-09-09 ENCOUNTER — HOSPITAL ENCOUNTER (OUTPATIENT)
Dept: LAB | Facility: MEDICAL CENTER | Age: 32
End: 2022-09-09
Attending: NURSE PRACTITIONER
Payer: COMMERCIAL

## 2022-09-09 DIAGNOSIS — R16.2 HEPATOSPLENOMEGALY: ICD-10-CM

## 2022-09-09 DIAGNOSIS — K65.1 INTRA-ABDOMINAL ABSCESS (HCC): ICD-10-CM

## 2022-09-09 DIAGNOSIS — Z13.21 ENCOUNTER FOR VITAMIN DEFICIENCY SCREENING: ICD-10-CM

## 2022-09-09 LAB
25(OH)D3 SERPL-MCNC: 27 NG/ML (ref 30–100)
ALBUMIN SERPL BCP-MCNC: 3.6 G/DL (ref 3.2–4.9)
ALBUMIN/GLOB SERPL: 1.1 G/DL
ALP SERPL-CCNC: 57 U/L (ref 30–99)
ALT SERPL-CCNC: 14 U/L (ref 2–50)
ANION GAP SERPL CALC-SCNC: 10 MMOL/L (ref 7–16)
AST SERPL-CCNC: 18 U/L (ref 12–45)
BASOPHILS # BLD AUTO: 1 % (ref 0–1.8)
BASOPHILS # BLD: 0.08 K/UL (ref 0–0.12)
BILIRUB SERPL-MCNC: 0.2 MG/DL (ref 0.1–1.5)
BUN SERPL-MCNC: 9 MG/DL (ref 8–22)
CALCIUM SERPL-MCNC: 9.2 MG/DL (ref 8.5–10.5)
CHLORIDE SERPL-SCNC: 102 MMOL/L (ref 96–112)
CO2 SERPL-SCNC: 25 MMOL/L (ref 20–33)
CREAT SERPL-MCNC: 0.66 MG/DL (ref 0.5–1.4)
EOSINOPHIL # BLD AUTO: 0.06 K/UL (ref 0–0.51)
EOSINOPHIL NFR BLD: 0.8 % (ref 0–6.9)
ERYTHROCYTE [DISTWIDTH] IN BLOOD BY AUTOMATED COUNT: 49.2 FL (ref 35.9–50)
FOLATE SERPL-MCNC: 8.2 NG/ML
GFR SERPLBLD CREATININE-BSD FMLA CKD-EPI: 121 ML/MIN/1.73 M 2
GLOBULIN SER CALC-MCNC: 3.3 G/DL (ref 1.9–3.5)
GLUCOSE SERPL-MCNC: 116 MG/DL (ref 65–99)
HCT VFR BLD AUTO: 33.6 % (ref 37–47)
HGB BLD-MCNC: 10.8 G/DL (ref 12–16)
IMM GRANULOCYTES # BLD AUTO: 0.06 K/UL (ref 0–0.11)
IMM GRANULOCYTES NFR BLD AUTO: 0.8 % (ref 0–0.9)
LYMPHOCYTES # BLD AUTO: 3.05 K/UL (ref 1–4.8)
LYMPHOCYTES NFR BLD: 39.2 % (ref 22–41)
MCH RBC QN AUTO: 26.9 PG (ref 27–33)
MCHC RBC AUTO-ENTMCNC: 32.1 G/DL (ref 33.6–35)
MCV RBC AUTO: 83.6 FL (ref 81.4–97.8)
MONOCYTES # BLD AUTO: 0.65 K/UL (ref 0–0.85)
MONOCYTES NFR BLD AUTO: 8.4 % (ref 0–13.4)
NEUTROPHILS # BLD AUTO: 3.88 K/UL (ref 2–7.15)
NEUTROPHILS NFR BLD: 49.8 % (ref 44–72)
NRBC # BLD AUTO: 0 K/UL
NRBC BLD-RTO: 0 /100 WBC
PLATELET # BLD AUTO: 449 K/UL (ref 164–446)
PMV BLD AUTO: 9.2 FL (ref 9–12.9)
POTASSIUM SERPL-SCNC: 4.6 MMOL/L (ref 3.6–5.5)
PROT SERPL-MCNC: 6.9 G/DL (ref 6–8.2)
RBC # BLD AUTO: 4.02 M/UL (ref 4.2–5.4)
SODIUM SERPL-SCNC: 137 MMOL/L (ref 135–145)
VIT B12 SERPL-MCNC: 802 PG/ML (ref 211–911)
WBC # BLD AUTO: 7.8 K/UL (ref 4.8–10.8)

## 2022-09-09 PROCEDURE — 82607 VITAMIN B-12: CPT

## 2022-09-09 PROCEDURE — 82306 VITAMIN D 25 HYDROXY: CPT

## 2022-09-09 PROCEDURE — 36415 COLL VENOUS BLD VENIPUNCTURE: CPT

## 2022-09-09 PROCEDURE — 82746 ASSAY OF FOLIC ACID SERUM: CPT

## 2022-09-09 PROCEDURE — 85025 COMPLETE CBC W/AUTO DIFF WBC: CPT

## 2022-09-09 PROCEDURE — 80053 COMPREHEN METABOLIC PANEL: CPT

## 2022-09-10 ENCOUNTER — TELEPHONE (OUTPATIENT)
Dept: MEDICAL GROUP | Facility: MEDICAL CENTER | Age: 32
End: 2022-09-10
Payer: COMMERCIAL

## 2022-09-10 DIAGNOSIS — D64.9 ANEMIA, UNSPECIFIED TYPE: ICD-10-CM

## 2022-09-10 DIAGNOSIS — R16.2 HEPATOSPLENOMEGALY: ICD-10-CM

## 2022-09-10 DIAGNOSIS — R18.8 OTHER ASCITES: ICD-10-CM

## 2022-09-11 NOTE — TELEPHONE ENCOUNTER
Please let pt know that the lab is wnl except her vitamin d is low so she needs to take d3 2000 units daily otc.   Her CBC shows that the wbc and dif is now wnl so no evidence of infection.  Her plts are also coming down.  But the h/h is down from last time.  Please repeat the cbc in a week to make sure she is not bleeding.

## 2022-09-13 DIAGNOSIS — R18.8 OTHER ASCITES: ICD-10-CM

## 2022-09-13 DIAGNOSIS — D64.9 ANEMIA, UNSPECIFIED TYPE: ICD-10-CM

## 2022-09-13 DIAGNOSIS — K65.9 ABDOMINAL INFECTION (HCC): ICD-10-CM

## 2022-09-13 DIAGNOSIS — K65.1 INTRA-ABDOMINAL ABSCESS (HCC): ICD-10-CM

## 2022-09-13 DIAGNOSIS — R16.2 HEPATOSPLENOMEGALY: ICD-10-CM

## 2022-09-20 ASSESSMENT — ENCOUNTER SYMPTOMS: FEVER: 1

## 2022-09-22 NOTE — PROGRESS NOTES
4 Eyes Skin Assessment Completed by GERALDO Hancock and GERALDO Queen.    Head WDL  Ears WDL  Nose WDL  Mouth WDL  Neck WDL  Breast/Chest WDL  Shoulder Blades WDL  Spine WDL  (R) Arm/Elbow/Hand WDL  (L) Arm/Elbow/Hand WDL  Abdomen 3 lap sites   Groin WDL  Scrotum/Coccyx/Buttocks WDL  (R) Leg WDL  (L) Leg WDL  (R) Heel/Foot/Toe WDL  (L) Heel/Foot/Toe WDL          Devices In Places Blood Pressure Cuff and Pulse Ox, shultz catheter      Interventions In Place Pillows and Pressure Redistribution Mattress    Possible Skin Injury No    Pictures Uploaded Into Epic N/A  Wound Consult Placed N/A  RN Wound Prevention Protocol Ordered No      Unna Boot Text: An Unna boot was placed to help immobilize the limb and facilitate more rapid healing.

## 2022-09-27 ENCOUNTER — HOSPITAL ENCOUNTER (OUTPATIENT)
Dept: RADIOLOGY | Facility: MEDICAL CENTER | Age: 32
End: 2022-09-27
Attending: NURSE PRACTITIONER
Payer: COMMERCIAL

## 2022-09-27 DIAGNOSIS — D64.9 ANEMIA, UNSPECIFIED TYPE: ICD-10-CM

## 2022-09-27 DIAGNOSIS — K65.1 INTRA-ABDOMINAL ABSCESS (HCC): ICD-10-CM

## 2022-09-27 DIAGNOSIS — K65.9 ABDOMINAL INFECTION (HCC): ICD-10-CM

## 2022-09-27 DIAGNOSIS — R16.2 HEPATOSPLENOMEGALY: ICD-10-CM

## 2022-09-27 DIAGNOSIS — R18.8 OTHER ASCITES: ICD-10-CM

## 2022-09-27 PROCEDURE — 76705 ECHO EXAM OF ABDOMEN: CPT

## 2022-09-29 ENCOUNTER — HOSPITAL ENCOUNTER (OUTPATIENT)
Dept: LAB | Facility: MEDICAL CENTER | Age: 32
End: 2022-09-29
Attending: NURSE PRACTITIONER
Payer: COMMERCIAL

## 2022-09-29 DIAGNOSIS — D64.9 ANEMIA, UNSPECIFIED TYPE: ICD-10-CM

## 2022-09-29 LAB
BASOPHILS # BLD AUTO: 0.7 % (ref 0–1.8)
BASOPHILS # BLD: 0.04 K/UL (ref 0–0.12)
EOSINOPHIL # BLD AUTO: 0.08 K/UL (ref 0–0.51)
EOSINOPHIL NFR BLD: 1.3 % (ref 0–6.9)
ERYTHROCYTE [DISTWIDTH] IN BLOOD BY AUTOMATED COUNT: 50.7 FL (ref 35.9–50)
HCT VFR BLD AUTO: 38 % (ref 37–47)
HGB BLD-MCNC: 12.3 G/DL (ref 12–16)
IMM GRANULOCYTES # BLD AUTO: 0.02 K/UL (ref 0–0.11)
IMM GRANULOCYTES NFR BLD AUTO: 0.3 % (ref 0–0.9)
LYMPHOCYTES # BLD AUTO: 3 K/UL (ref 1–4.8)
LYMPHOCYTES NFR BLD: 50.4 % (ref 22–41)
MCH RBC QN AUTO: 27.6 PG (ref 27–33)
MCHC RBC AUTO-ENTMCNC: 32.4 G/DL (ref 33.6–35)
MCV RBC AUTO: 85.2 FL (ref 81.4–97.8)
MONOCYTES # BLD AUTO: 0.42 K/UL (ref 0–0.85)
MONOCYTES NFR BLD AUTO: 7.1 % (ref 0–13.4)
NEUTROPHILS # BLD AUTO: 2.39 K/UL (ref 2–7.15)
NEUTROPHILS NFR BLD: 40.2 % (ref 44–72)
NRBC # BLD AUTO: 0 K/UL
NRBC BLD-RTO: 0 /100 WBC
PLATELET # BLD AUTO: 256 K/UL (ref 164–446)
PMV BLD AUTO: 9.5 FL (ref 9–12.9)
RBC # BLD AUTO: 4.46 M/UL (ref 4.2–5.4)
WBC # BLD AUTO: 6 K/UL (ref 4.8–10.8)

## 2022-09-29 PROCEDURE — 36415 COLL VENOUS BLD VENIPUNCTURE: CPT

## 2022-09-29 PROCEDURE — 85025 COMPLETE CBC W/AUTO DIFF WBC: CPT

## 2022-10-02 DIAGNOSIS — K65.1 INTRA-ABDOMINAL ABSCESS (HCC): ICD-10-CM

## 2022-10-02 DIAGNOSIS — R16.2 HEPATOSPLENOMEGALY: ICD-10-CM

## 2022-10-02 RX ORDER — METRONIDAZOLE 500 MG/1
500 TABLET ORAL EVERY 8 HOURS
Qty: 39 TABLET | Refills: 0 | Status: SHIPPED | OUTPATIENT
Start: 2022-10-02 | End: 2022-10-15

## 2022-10-02 RX ORDER — AMOXICILLIN AND CLAVULANATE POTASSIUM 875; 125 MG/1; MG/1
1 TABLET, FILM COATED ORAL 2 TIMES DAILY
Qty: 26 TABLET | Refills: 0 | Status: SHIPPED | OUTPATIENT
Start: 2022-10-02 | End: 2022-10-15

## 2022-10-20 ENCOUNTER — APPOINTMENT (OUTPATIENT)
Dept: RADIOLOGY | Facility: MEDICAL CENTER | Age: 32
End: 2022-10-20
Attending: SURGERY
Payer: COMMERCIAL

## 2022-10-30 ENCOUNTER — TELEMEDICINE (OUTPATIENT)
Dept: TELEHEALTH | Facility: TELEMEDICINE | Age: 32
End: 2022-10-30
Payer: COMMERCIAL

## 2022-10-30 VITALS — HEIGHT: 67 IN | BODY MASS INDEX: 28.04 KG/M2

## 2022-10-30 DIAGNOSIS — L50.9 HIVES: ICD-10-CM

## 2022-10-30 PROCEDURE — 99213 OFFICE O/P EST LOW 20 MIN: CPT | Mod: 95 | Performed by: PHYSICIAN ASSISTANT

## 2022-10-30 RX ORDER — PREDNISONE 10 MG/1
40 TABLET ORAL DAILY
Qty: 20 TABLET | Refills: 0 | Status: SHIPPED | OUTPATIENT
Start: 2022-10-30 | End: 2022-11-04

## 2022-10-30 NOTE — PROGRESS NOTES
Telemedicine Visit: Established Patient     This evaluation was conducted via ZOOM using secure and encrypted videoconferencing technology. The patient was in a private location in the [facility].    The patient's identity was confirmed and verbal consent was obtained for this virtual visit.    Subjective:   CC: rash on feet  Brunilda Webber is a 32 y.o. female presenting for evaluation and management of pruritic itchy rash on the dorsum of bilateral feet developing yesterday and today.  She does note potential inciting factor be a new hand cream that she put on her feet while she was walking around in [facility].  She has been using hydrocortisone cream with minimal improvement.    Review of Systems   Skin:  Positive for itching and rash.      Current medicines (including changes today)  Medications:    acetaminophen Tabs  famotidine Tabs  predniSONE Tabs    Allergies: Onion    Problem List: Brunilda Webber does not have any pertinent problems on file.    Surgical History:  Past Surgical History:   Procedure Laterality Date    KS LAP,RMV  ADNEXAL STRUCTURE Right 2022    Procedure: SALPINGECTOMY - LAPROSCOPIC - ECTOPIC;  Surgeon: Ismael Stevenson M.D.;  Location: SURGERY SAME DAY [facility];  Service: Gynecology    KS LAP,DIAGNOSTIC ABDOMEN N/A 2022    Procedure: PELVISCOPY;  Surgeon: Ismael Stevenson M.D.;  Location: SURGERY SAME DAY [facility];  Service: Gynecology    KS LAP,APPENDECTOMY N/A 2022    Procedure: APPENDECTOMY, LAPAROSCOPIC;  Surgeon: Ismael Stevenson M.D.;  Location: SURGERY SAME DAY [facility];  Service: Gynecology    PRIMARY C SECTION N/A 3/18/2021    Procedure:  SECTION, PRIMARY;  Surgeon: Esperanza Fuentes M.D.;  Location: SURGERY LABOR AND DELIVERY;  Service: Labor and Delivery       Past Social Hx: Brunilda Webber  reports that she has never smoked. She has never used smokeless tobacco. She reports that she does not currently use alcohol. She reports that she does not use  "drugs.     Past Family Hx:  Brunilda Webber family history includes Cancer in her father, maternal grandmother, and paternal grandmother; Heart Disease (age of onset: 70) in her maternal grandfather; Thyroid in her mother.     Problem list, medications, and allergies reviewed by myself today in Epic.     Objective:   Ht 1.702 m (5' 7\")   BMI 28.04 kg/m²  Not taken due to virtual visit.    Physical Exam:  Constitutional: Alert, no distress, well-groomed.  Skin: Raised patches of erythema and urticarial appearance distributed on dorsum of bilateral feet with confluence.  No vesicles or bulla   eye: Round. Conjunctiva clear, lids normal. No icterus.   ENMT: Lips pink without lesions, good dentition, moist mucous membranes. Phonation normal.  Neck: No masses, no thyromegaly. Moves freely without pain.  CV: Pulse as reported by patient is normal  Respiratory: Unlabored respiratory effort, no cough or audible wheeze  Psych: Alert and oriented x3, normal affect and mood.       Assessment and Plan:   The following treatment plan was discussed:     1. Hives  - predniSONE (DELTASONE) 10 MG Tab; Take 4 Tablets by mouth every day for 5 days.  Dispense: 20 Tablet; Refill: 0    Discontinue offending agent, avoid any exacerbating factors, continue oral antihistamine, consider oral diphenhydramine as well as topical hydrocortisone cream, if not showing improvement in the coming 24 to 48 hours recommend in-person evaluation      Follow-up: No follow-ups on file.           "

## 2022-11-16 ENCOUNTER — HOSPITAL ENCOUNTER (OUTPATIENT)
Facility: MEDICAL CENTER | Age: 32
End: 2022-11-16
Attending: OBSTETRICS & GYNECOLOGY
Payer: COMMERCIAL

## 2022-11-16 LAB
PROLACTIN SERPL-MCNC: 10.7 NG/ML (ref 2.8–26)
TSH SERPL DL<=0.005 MIU/L-ACNC: 1.44 UIU/ML (ref 0.38–5.33)

## 2022-11-16 PROCEDURE — 84146 ASSAY OF PROLACTIN: CPT

## 2022-11-16 PROCEDURE — 84443 ASSAY THYROID STIM HORMONE: CPT

## 2023-04-05 ENCOUNTER — APPOINTMENT (OUTPATIENT)
Dept: ADMISSIONS | Facility: MEDICAL CENTER | Age: 33
End: 2023-04-05
Attending: OBSTETRICS & GYNECOLOGY
Payer: COMMERCIAL

## 2023-04-07 ENCOUNTER — PRE-ADMISSION TESTING (OUTPATIENT)
Dept: ADMISSIONS | Facility: MEDICAL CENTER | Age: 33
End: 2023-04-07
Attending: OBSTETRICS & GYNECOLOGY
Payer: COMMERCIAL

## 2023-04-07 RX ORDER — FOLIC ACID 0.8 MG
2000 TABLET ORAL
COMMUNITY

## 2023-04-07 RX ORDER — CHOLECALCIFEROL (VITAMIN D3) 125 MCG
CAPSULE ORAL
COMMUNITY

## 2023-04-19 ENCOUNTER — PRE-ADMISSION TESTING (OUTPATIENT)
Dept: ADMISSIONS | Facility: MEDICAL CENTER | Age: 33
End: 2023-04-19
Attending: OBSTETRICS & GYNECOLOGY
Payer: COMMERCIAL

## 2023-04-19 DIAGNOSIS — Z01.812 PRE-OPERATIVE LABORATORY EXAMINATION: ICD-10-CM

## 2023-04-19 LAB
ANION GAP SERPL CALC-SCNC: 8 MMOL/L (ref 7–16)
APPEARANCE UR: CLEAR
BASOPHILS # BLD AUTO: 0.5 % (ref 0–1.8)
BASOPHILS # BLD: 0.03 K/UL (ref 0–0.12)
BILIRUB UR QL STRIP.AUTO: NEGATIVE
BUN SERPL-MCNC: 12 MG/DL (ref 8–22)
CALCIUM SERPL-MCNC: 9.4 MG/DL (ref 8.5–10.5)
CHLORIDE SERPL-SCNC: 102 MMOL/L (ref 96–112)
CO2 SERPL-SCNC: 25 MMOL/L (ref 20–33)
COLOR UR: YELLOW
CREAT SERPL-MCNC: 0.72 MG/DL (ref 0.5–1.4)
EOSINOPHIL # BLD AUTO: 0.07 K/UL (ref 0–0.51)
EOSINOPHIL NFR BLD: 1.1 % (ref 0–6.9)
ERYTHROCYTE [DISTWIDTH] IN BLOOD BY AUTOMATED COUNT: 41.1 FL (ref 35.9–50)
GFR SERPLBLD CREATININE-BSD FMLA CKD-EPI: 115 ML/MIN/1.73 M 2
GLUCOSE SERPL-MCNC: 91 MG/DL (ref 65–99)
GLUCOSE UR STRIP.AUTO-MCNC: NEGATIVE MG/DL
HCG SERPL QL: NEGATIVE
HCT VFR BLD AUTO: 41.2 % (ref 37–47)
HGB BLD-MCNC: 13.8 G/DL (ref 12–16)
IMM GRANULOCYTES # BLD AUTO: 0.03 K/UL (ref 0–0.11)
IMM GRANULOCYTES NFR BLD AUTO: 0.5 % (ref 0–0.9)
INR PPP: 1.08 (ref 0.87–1.13)
KETONES UR STRIP.AUTO-MCNC: NEGATIVE MG/DL
LEUKOCYTE ESTERASE UR QL STRIP.AUTO: NEGATIVE
LYMPHOCYTES # BLD AUTO: 2.25 K/UL (ref 1–4.8)
LYMPHOCYTES NFR BLD: 35.5 % (ref 22–41)
MCH RBC QN AUTO: 27.4 PG (ref 27–33)
MCHC RBC AUTO-ENTMCNC: 33.5 G/DL (ref 33.6–35)
MCV RBC AUTO: 81.9 FL (ref 81.4–97.8)
MICRO URNS: NORMAL
MONOCYTES # BLD AUTO: 0.43 K/UL (ref 0–0.85)
MONOCYTES NFR BLD AUTO: 6.8 % (ref 0–13.4)
NEUTROPHILS # BLD AUTO: 3.53 K/UL (ref 2–7.15)
NEUTROPHILS NFR BLD: 55.6 % (ref 44–72)
NITRITE UR QL STRIP.AUTO: NEGATIVE
NRBC # BLD AUTO: 0 K/UL
NRBC BLD-RTO: 0 /100 WBC
PH UR STRIP.AUTO: 7.5 [PH] (ref 5–8)
PLATELET # BLD AUTO: 220 K/UL (ref 164–446)
PMV BLD AUTO: 9.1 FL (ref 9–12.9)
POTASSIUM SERPL-SCNC: 4.7 MMOL/L (ref 3.6–5.5)
PROT UR QL STRIP: NEGATIVE MG/DL
PROTHROMBIN TIME: 13.9 SEC (ref 12–14.6)
RBC # BLD AUTO: 5.03 M/UL (ref 4.2–5.4)
RBC UR QL AUTO: NEGATIVE
SODIUM SERPL-SCNC: 135 MMOL/L (ref 135–145)
SP GR UR STRIP.AUTO: 1.02
UROBILINOGEN UR STRIP.AUTO-MCNC: 0.2 MG/DL
WBC # BLD AUTO: 6.3 K/UL (ref 4.8–10.8)

## 2023-04-19 PROCEDURE — 81003 URINALYSIS AUTO W/O SCOPE: CPT

## 2023-04-19 PROCEDURE — 36415 COLL VENOUS BLD VENIPUNCTURE: CPT

## 2023-04-19 PROCEDURE — 85025 COMPLETE CBC W/AUTO DIFF WBC: CPT

## 2023-04-19 PROCEDURE — 80048 BASIC METABOLIC PNL TOTAL CA: CPT

## 2023-04-19 PROCEDURE — 84703 CHORIONIC GONADOTROPIN ASSAY: CPT

## 2023-04-19 PROCEDURE — 85610 PROTHROMBIN TIME: CPT

## 2023-04-20 ENCOUNTER — HOSPITAL ENCOUNTER (OUTPATIENT)
Dept: LAB | Facility: MEDICAL CENTER | Age: 33
End: 2023-04-20
Attending: OBSTETRICS & GYNECOLOGY
Payer: COMMERCIAL

## 2023-04-20 LAB
ESTRADIOL SERPL-MCNC: 103 PG/ML
FSH SERPL-ACNC: 5.3 MIU/ML
HBV SURFACE AB SERPL IA-ACNC: 320 MIU/ML (ref 0–10)
HBV SURFACE AG SER QL: NORMAL
HCV AB SER QL: NORMAL
HIV 1+2 AB+HIV1 P24 AG SERPL QL IA: NORMAL
PROLACTIN SERPL-MCNC: 7.4 NG/ML (ref 2.8–26)
T PALLIDUM AB SER QL IA: NORMAL
TSH SERPL DL<=0.005 MIU/L-ACNC: 1.6 UIU/ML (ref 0.38–5.33)

## 2023-04-20 PROCEDURE — 84146 ASSAY OF PROLACTIN: CPT

## 2023-04-20 PROCEDURE — 87340 HEPATITIS B SURFACE AG IA: CPT

## 2023-04-20 PROCEDURE — 87591 N.GONORRHOEAE DNA AMP PROB: CPT

## 2023-04-20 PROCEDURE — 82670 ASSAY OF TOTAL ESTRADIOL: CPT

## 2023-04-20 PROCEDURE — 86803 HEPATITIS C AB TEST: CPT

## 2023-04-20 PROCEDURE — 84443 ASSAY THYROID STIM HORMONE: CPT

## 2023-04-20 PROCEDURE — 83520 IMMUNOASSAY QUANT NOS NONAB: CPT

## 2023-04-20 PROCEDURE — 86780 TREPONEMA PALLIDUM: CPT

## 2023-04-20 PROCEDURE — 36415 COLL VENOUS BLD VENIPUNCTURE: CPT

## 2023-04-20 PROCEDURE — 83001 ASSAY OF GONADOTROPIN (FSH): CPT

## 2023-04-20 PROCEDURE — 87389 HIV-1 AG W/HIV-1&-2 AB AG IA: CPT

## 2023-04-20 PROCEDURE — 87491 CHLMYD TRACH DNA AMP PROBE: CPT

## 2023-04-20 PROCEDURE — 86706 HEP B SURFACE ANTIBODY: CPT

## 2023-04-23 LAB — MIS SERPL-MCNC: 7.31 NG/ML (ref 0.18–11.71)

## 2023-04-30 ENCOUNTER — ANESTHESIA EVENT (OUTPATIENT)
Dept: SURGERY | Facility: MEDICAL CENTER | Age: 33
End: 2023-04-30
Payer: COMMERCIAL

## 2023-05-01 ENCOUNTER — ANESTHESIA (OUTPATIENT)
Dept: SURGERY | Facility: MEDICAL CENTER | Age: 33
End: 2023-05-01
Payer: COMMERCIAL

## 2023-05-01 ENCOUNTER — HOSPITAL ENCOUNTER (OUTPATIENT)
Facility: MEDICAL CENTER | Age: 33
End: 2023-05-01
Attending: OBSTETRICS & GYNECOLOGY | Admitting: OBSTETRICS & GYNECOLOGY
Payer: COMMERCIAL

## 2023-05-01 VITALS
HEART RATE: 68 BPM | RESPIRATION RATE: 17 BRPM | HEIGHT: 67 IN | DIASTOLIC BLOOD PRESSURE: 56 MMHG | BODY MASS INDEX: 30.48 KG/M2 | SYSTOLIC BLOOD PRESSURE: 103 MMHG | TEMPERATURE: 97.1 F | OXYGEN SATURATION: 96 % | WEIGHT: 194.22 LBS

## 2023-05-01 LAB
HCG UR QL: NEGATIVE
PATHOLOGY CONSULT NOTE: NORMAL

## 2023-05-01 PROCEDURE — 700102 HCHG RX REV CODE 250 W/ 637 OVERRIDE(OP): Performed by: STUDENT IN AN ORGANIZED HEALTH CARE EDUCATION/TRAINING PROGRAM

## 2023-05-01 PROCEDURE — 160048 HCHG OR STATISTICAL LEVEL 1-5: Performed by: OBSTETRICS & GYNECOLOGY

## 2023-05-01 PROCEDURE — 00840 ANES IPER PX LOWER ABD NOS: CPT | Performed by: STUDENT IN AN ORGANIZED HEALTH CARE EDUCATION/TRAINING PROGRAM

## 2023-05-01 PROCEDURE — 160035 HCHG PACU - 1ST 60 MINS PHASE I: Performed by: OBSTETRICS & GYNECOLOGY

## 2023-05-01 PROCEDURE — 88305 TISSUE EXAM BY PATHOLOGIST: CPT

## 2023-05-01 PROCEDURE — 81025 URINE PREGNANCY TEST: CPT

## 2023-05-01 PROCEDURE — 160002 HCHG RECOVERY MINUTES (STAT): Performed by: OBSTETRICS & GYNECOLOGY

## 2023-05-01 PROCEDURE — 700111 HCHG RX REV CODE 636 W/ 250 OVERRIDE (IP): Performed by: STUDENT IN AN ORGANIZED HEALTH CARE EDUCATION/TRAINING PROGRAM

## 2023-05-01 PROCEDURE — 700101 HCHG RX REV CODE 250: Performed by: STUDENT IN AN ORGANIZED HEALTH CARE EDUCATION/TRAINING PROGRAM

## 2023-05-01 PROCEDURE — 160046 HCHG PACU - 1ST 60 MINS PHASE II: Performed by: OBSTETRICS & GYNECOLOGY

## 2023-05-01 PROCEDURE — C1765 ADHESION BARRIER: HCPCS | Performed by: OBSTETRICS & GYNECOLOGY

## 2023-05-01 PROCEDURE — A9270 NON-COVERED ITEM OR SERVICE: HCPCS | Performed by: STUDENT IN AN ORGANIZED HEALTH CARE EDUCATION/TRAINING PROGRAM

## 2023-05-01 PROCEDURE — 700105 HCHG RX REV CODE 258: Performed by: OBSTETRICS & GYNECOLOGY

## 2023-05-01 PROCEDURE — 160029 HCHG SURGERY MINUTES - 1ST 30 MINS LEVEL 4: Performed by: OBSTETRICS & GYNECOLOGY

## 2023-05-01 PROCEDURE — 160041 HCHG SURGERY MINUTES - EA ADDL 1 MIN LEVEL 4: Performed by: OBSTETRICS & GYNECOLOGY

## 2023-05-01 PROCEDURE — 160025 RECOVERY II MINUTES (STATS): Performed by: OBSTETRICS & GYNECOLOGY

## 2023-05-01 PROCEDURE — 88302 TISSUE EXAM BY PATHOLOGIST: CPT

## 2023-05-01 PROCEDURE — 160009 HCHG ANES TIME/MIN: Performed by: OBSTETRICS & GYNECOLOGY

## 2023-05-01 PROCEDURE — 700101 HCHG RX REV CODE 250: Performed by: OBSTETRICS & GYNECOLOGY

## 2023-05-01 RX ORDER — ACETAMINOPHEN 500 MG
TABLET ORAL
Status: COMPLETED
Start: 2023-05-01 | End: 2023-05-01

## 2023-05-01 RX ORDER — OXYCODONE HCL 5 MG/5 ML
10 SOLUTION, ORAL ORAL
Status: DISCONTINUED | OUTPATIENT
Start: 2023-05-01 | End: 2023-05-01 | Stop reason: HOSPADM

## 2023-05-01 RX ORDER — HYDROMORPHONE HYDROCHLORIDE 2 MG/ML
INJECTION, SOLUTION INTRAMUSCULAR; INTRAVENOUS; SUBCUTANEOUS PRN
Status: DISCONTINUED | OUTPATIENT
Start: 2023-05-01 | End: 2023-05-01 | Stop reason: SURG

## 2023-05-01 RX ORDER — ROCURONIUM BROMIDE 10 MG/ML
INJECTION, SOLUTION INTRAVENOUS PRN
Status: DISCONTINUED | OUTPATIENT
Start: 2023-05-01 | End: 2023-05-01 | Stop reason: SURG

## 2023-05-01 RX ORDER — MIDAZOLAM HYDROCHLORIDE 1 MG/ML
INJECTION INTRAMUSCULAR; INTRAVENOUS PRN
Status: DISCONTINUED | OUTPATIENT
Start: 2023-05-01 | End: 2023-05-01 | Stop reason: SURG

## 2023-05-01 RX ORDER — EPHEDRINE SULFATE 50 MG/ML
INJECTION, SOLUTION INTRAVENOUS PRN
Status: DISCONTINUED | OUTPATIENT
Start: 2023-05-01 | End: 2023-05-01 | Stop reason: SURG

## 2023-05-01 RX ORDER — SCOLOPAMINE TRANSDERMAL SYSTEM 1 MG/1
PATCH, EXTENDED RELEASE TRANSDERMAL
Status: COMPLETED
Start: 2023-05-01 | End: 2023-05-01

## 2023-05-01 RX ORDER — OXYCODONE HCL 5 MG/5 ML
5 SOLUTION, ORAL ORAL
Status: DISCONTINUED | OUTPATIENT
Start: 2023-05-01 | End: 2023-05-01 | Stop reason: HOSPADM

## 2023-05-01 RX ORDER — HALOPERIDOL 5 MG/ML
1 INJECTION INTRAMUSCULAR
Status: DISCONTINUED | OUTPATIENT
Start: 2023-05-01 | End: 2023-05-01 | Stop reason: HOSPADM

## 2023-05-01 RX ORDER — SODIUM CHLORIDE, SODIUM LACTATE, POTASSIUM CHLORIDE, CALCIUM CHLORIDE 600; 310; 30; 20 MG/100ML; MG/100ML; MG/100ML; MG/100ML
INJECTION, SOLUTION INTRAVENOUS CONTINUOUS
Status: ACTIVE | OUTPATIENT
Start: 2023-05-01 | End: 2023-05-01

## 2023-05-01 RX ORDER — ACETAMINOPHEN 325 MG/1
TABLET ORAL PRN
Status: DISCONTINUED | OUTPATIENT
Start: 2023-05-01 | End: 2023-05-01 | Stop reason: SURG

## 2023-05-01 RX ORDER — ONDANSETRON 2 MG/ML
4 INJECTION INTRAMUSCULAR; INTRAVENOUS
Status: COMPLETED | OUTPATIENT
Start: 2023-05-01 | End: 2023-05-01

## 2023-05-01 RX ORDER — HYDROMORPHONE HYDROCHLORIDE 1 MG/ML
0.2 INJECTION, SOLUTION INTRAMUSCULAR; INTRAVENOUS; SUBCUTANEOUS
Status: DISCONTINUED | OUTPATIENT
Start: 2023-05-01 | End: 2023-05-01 | Stop reason: HOSPADM

## 2023-05-01 RX ORDER — DIPHENHYDRAMINE HYDROCHLORIDE 50 MG/ML
12.5 INJECTION INTRAMUSCULAR; INTRAVENOUS
Status: DISCONTINUED | OUTPATIENT
Start: 2023-05-01 | End: 2023-05-01 | Stop reason: HOSPADM

## 2023-05-01 RX ORDER — HYDROMORPHONE HYDROCHLORIDE 1 MG/ML
0.4 INJECTION, SOLUTION INTRAMUSCULAR; INTRAVENOUS; SUBCUTANEOUS
Status: DISCONTINUED | OUTPATIENT
Start: 2023-05-01 | End: 2023-05-01 | Stop reason: HOSPADM

## 2023-05-01 RX ORDER — BUPIVACAINE HYDROCHLORIDE AND EPINEPHRINE 5; 5 MG/ML; UG/ML
INJECTION, SOLUTION EPIDURAL; INTRACAUDAL; PERINEURAL
Status: DISCONTINUED | OUTPATIENT
Start: 2023-05-01 | End: 2023-05-01 | Stop reason: HOSPADM

## 2023-05-01 RX ORDER — CEFAZOLIN SODIUM 1 G/3ML
INJECTION, POWDER, FOR SOLUTION INTRAMUSCULAR; INTRAVENOUS PRN
Status: DISCONTINUED | OUTPATIENT
Start: 2023-05-01 | End: 2023-05-01 | Stop reason: SURG

## 2023-05-01 RX ORDER — HYDROMORPHONE HYDROCHLORIDE 1 MG/ML
0.1 INJECTION, SOLUTION INTRAMUSCULAR; INTRAVENOUS; SUBCUTANEOUS
Status: DISCONTINUED | OUTPATIENT
Start: 2023-05-01 | End: 2023-05-01 | Stop reason: HOSPADM

## 2023-05-01 RX ORDER — ONDANSETRON 2 MG/ML
INJECTION INTRAMUSCULAR; INTRAVENOUS PRN
Status: DISCONTINUED | OUTPATIENT
Start: 2023-05-01 | End: 2023-05-01 | Stop reason: SURG

## 2023-05-01 RX ORDER — LIDOCAINE HYDROCHLORIDE 20 MG/ML
INJECTION, SOLUTION EPIDURAL; INFILTRATION; INTRACAUDAL; PERINEURAL PRN
Status: DISCONTINUED | OUTPATIENT
Start: 2023-05-01 | End: 2023-05-01 | Stop reason: SURG

## 2023-05-01 RX ORDER — DEXAMETHASONE SODIUM PHOSPHATE 4 MG/ML
INJECTION, SOLUTION INTRA-ARTICULAR; INTRALESIONAL; INTRAMUSCULAR; INTRAVENOUS; SOFT TISSUE PRN
Status: DISCONTINUED | OUTPATIENT
Start: 2023-05-01 | End: 2023-05-01 | Stop reason: SURG

## 2023-05-01 RX ORDER — LIDOCAINE HYDROCHLORIDE 40 MG/ML
SOLUTION TOPICAL PRN
Status: DISCONTINUED | OUTPATIENT
Start: 2023-05-01 | End: 2023-05-01 | Stop reason: SURG

## 2023-05-01 RX ORDER — SCOLOPAMINE TRANSDERMAL SYSTEM 1 MG/1
PATCH, EXTENDED RELEASE TRANSDERMAL PRN
Status: DISCONTINUED | OUTPATIENT
Start: 2023-05-01 | End: 2023-05-01 | Stop reason: SURG

## 2023-05-01 RX ADMIN — SUGAMMADEX 200 MG: 100 INJECTION, SOLUTION INTRAVENOUS at 09:38

## 2023-05-01 RX ADMIN — HYDROMORPHONE HYDROCHLORIDE 0.4 MG: 2 INJECTION INTRAMUSCULAR; INTRAVENOUS; SUBCUTANEOUS at 08:34

## 2023-05-01 RX ADMIN — ROCURONIUM BROMIDE 10 MG: 10 INJECTION, SOLUTION INTRAVENOUS at 09:06

## 2023-05-01 RX ADMIN — FENTANYL CITRATE 50 MCG: 50 INJECTION, SOLUTION INTRAMUSCULAR; INTRAVENOUS at 07:58

## 2023-05-01 RX ADMIN — ROCURONIUM BROMIDE 50 MG: 10 INJECTION, SOLUTION INTRAVENOUS at 07:37

## 2023-05-01 RX ADMIN — EPHEDRINE SULFATE 10 MG: 50 INJECTION, SOLUTION INTRAVENOUS at 07:57

## 2023-05-01 RX ADMIN — FENTANYL CITRATE 50 MCG: 50 INJECTION, SOLUTION INTRAMUSCULAR; INTRAVENOUS at 07:40

## 2023-05-01 RX ADMIN — LIDOCAINE HYDROCHLORIDE 40 MG: 20 INJECTION, SOLUTION EPIDURAL; INFILTRATION; INTRACAUDAL at 07:37

## 2023-05-01 RX ADMIN — ONDANSETRON 4 MG: 2 INJECTION INTRAMUSCULAR; INTRAVENOUS at 09:31

## 2023-05-01 RX ADMIN — EPHEDRINE SULFATE 5 MG: 50 INJECTION, SOLUTION INTRAVENOUS at 08:54

## 2023-05-01 RX ADMIN — ONDANSETRON 4 MG: 2 INJECTION INTRAMUSCULAR; INTRAVENOUS at 10:20

## 2023-05-01 RX ADMIN — PROPOFOL 170 MG: 10 INJECTION, EMULSION INTRAVENOUS at 07:37

## 2023-05-01 RX ADMIN — DEXAMETHASONE SODIUM PHOSPHATE 4 MG: 4 INJECTION, SOLUTION INTRA-ARTICULAR; INTRALESIONAL; INTRAMUSCULAR; INTRAVENOUS; SOFT TISSUE at 07:55

## 2023-05-01 RX ADMIN — CEFAZOLIN 2 G: 330 INJECTION, POWDER, FOR SOLUTION INTRAMUSCULAR; INTRAVENOUS at 07:37

## 2023-05-01 RX ADMIN — HYDROMORPHONE HYDROCHLORIDE 0.4 MG: 2 INJECTION INTRAMUSCULAR; INTRAVENOUS; SUBCUTANEOUS at 09:40

## 2023-05-01 RX ADMIN — MIDAZOLAM HYDROCHLORIDE 2 MG: 1 INJECTION, SOLUTION INTRAMUSCULAR; INTRAVENOUS at 07:33

## 2023-05-01 RX ADMIN — ROCURONIUM BROMIDE 10 MG: 10 INJECTION, SOLUTION INTRAVENOUS at 08:26

## 2023-05-01 RX ADMIN — FENTANYL CITRATE 50 MCG: 50 INJECTION, SOLUTION INTRAMUSCULAR; INTRAVENOUS at 08:12

## 2023-05-01 RX ADMIN — EPHEDRINE SULFATE 10 MG: 50 INJECTION, SOLUTION INTRAVENOUS at 09:11

## 2023-05-01 RX ADMIN — HYDROMORPHONE HYDROCHLORIDE 0.4 MG: 2 INJECTION INTRAMUSCULAR; INTRAVENOUS; SUBCUTANEOUS at 09:31

## 2023-05-01 RX ADMIN — ACETAMINOPHEN 1000 MG: 325 TABLET ORAL at 07:22

## 2023-05-01 RX ADMIN — FENTANYL CITRATE 100 MCG: 50 INJECTION, SOLUTION INTRAMUSCULAR; INTRAVENOUS at 07:37

## 2023-05-01 RX ADMIN — SODIUM CHLORIDE, POTASSIUM CHLORIDE, SODIUM LACTATE AND CALCIUM CHLORIDE: 600; 310; 30; 20 INJECTION, SOLUTION INTRAVENOUS at 07:31

## 2023-05-01 RX ADMIN — EPHEDRINE SULFATE 5 MG: 50 INJECTION, SOLUTION INTRAVENOUS at 08:50

## 2023-05-01 RX ADMIN — SCOPALAMINE 1 PATCH: 1 PATCH, EXTENDED RELEASE TRANSDERMAL at 07:22

## 2023-05-01 RX ADMIN — ROCURONIUM BROMIDE 20 MG: 10 INJECTION, SOLUTION INTRAVENOUS at 08:08

## 2023-05-01 RX ADMIN — LIDOCAINE HYDROCHLORIDE 3 ML: 40 SOLUTION TOPICAL at 07:40

## 2023-05-01 ASSESSMENT — FIBROSIS 4 INDEX: FIB4 SCORE: 0.68

## 2023-05-01 NOTE — PROGRESS NOTES
Pt arrived to PACU stable. Pt remains A&Ox4, VSS, incisions are CDI, glasses on person, no other belongings in PACU. [facility]. Pt currently resting in bed in no acute distress.  1 script sent to pharmacy.

## 2023-05-01 NOTE — OP REPORT
DATE OF SERVICE:  05/01/2023     PREOPERATIVE DIAGNOSES:  Pelvic pain with left hydrosalpinx and retained   products of conception.     POSTOPERATIVE DIAGNOSES:  1.  Extensive pelvic adhesions, left hydrosalpinx.  2.  Uterine polyps.     OPERATIONS PERFORMED:  1.  Pelviscopy with extensive lysis of adhesions and isolation of left   hydrosalpinx with a left salpingectomy.  2. Hysteroscopy with resection of polyps.     SURGEON:  Andres Gilbert MD     ANESTHESIA:  General     ANESTHESIOLOGIST:  Tom Holbrook MD     FINDINGS:  By the laparoscope, there were extensive pelvic adhesions in the   pelvis and perihepatic areas only.  The appendix was free of adhesions, but   there were perihepatic adhesions all the way up to the liver. In the pelvis,   there were adhesions fixing the left adnexa, the left ovary and the bowel to   the posterior uterus.  These were all easily lysed, as the adhesions were so   fresh and easy to resect.  This freed to the left tube, which was grossly   dilated approximately 6 cm long x 4 cm wide.  It was resected off the left   ovary, which appeared normal.  The right tube was surgically absent and the   right ovary was buried in the pelvic wall under adhesions.     ESTIMATED BLOOD LOSS:  20 mL     COMPLICATIONS:  None.     SPECIMENS:  Left tube.     OPERATION IN DETAIL:  After informed consent was obtained, the patient was   brought to the operating room where general anesthesia was administered.  She   was then placed in the dorsal lithotomy position and prepped and draped in the   usual sterile fashion.  The cervix was visualized with a speculum and grasped   on the anterior lip with a Corey tenaculum and the cervix was dilated to a   #10 size Hegar dilator.  Using the resectoscope and sorbitol distention media,   the above-mentioned findings were noted.  The loop was used without cautery   to resect the multitude of polypoid lesions from the cavity until the cavity   was completely free  and clear and open with both tubal ostia identified.  The   hysteroscope was then removed and a ZUMI uterine manipulator placed in the   cavity of the uterus.  Attention was then turned to the abdomen.  After   regloving, the subumbilical area was infiltrated with 0.5% Marcaine solution   and a vertical skin incision was made approximately 1.5 cm long in order to   allow open dissection down to the pre-peritoneum.  At this point, it was felt   like there were no adhesions underneath the abdominal incision, so a Veress   needle was advanced into the peritoneal cavity and the abdomen insufflated   with 3 liters of CO2 gas.  Next, the laparoscopic trocar and sheath were   placed followed by the laparoscope.  The patient was then placed in the   Trendelenburg position and the above-mentioned findings were noted with   adhesions around the perihepatic area and the pelvis.  A second port was   placed in the suprapubic region by cutting a 5 mm incision through which an   accessory port was placed.  Using these 2 ports, the entire operation was   performed.     Using the hook cautery, the multitude of adhesions in the pelvis were freed   and lysed in order to remove the bowel, the adnexa and the ovary all apart.    The sigmoid was returned to the abdominal cavity above the pelvic brim and the   rectum was dissociated from the left adnexa.  The left tube and ovary were   then  and removed off the ovarian fossa, making them freely mobile.    The left tube was grossly dilated with blunted fimbria, so it was felt best to   remove the tube at this time.  This was done by cauterizing along the   mesosalpinx beginning at the infundibulopelvic area, taking great care to   avoid injury to the vessels feeding the ovary laterally.  The tube was then   cut along the mesosalpinx and resected off in its entirety to the cornua.  The   right tube was resected toward the cornua already.  The left ovary was now   freely mobile and  appeared normal.  The right ovary, however, was buried in   the pelvic adhesions and fixed into the pelvic sidewall.  In the upper   abdomen, the perihepatic adhesions were then lysed; however, they were very   vascular, so once the largest adhesion was taken, the rest were left in place,   which were up higher and hard to reach.  Hemostasis was secured at the right   upper quadrant.  Next, the pelvis was copiously irrigated and noting no   further bleeding.  Interceed was then placed in the cul-de-sac, the posterior   uterus and around the left adnexa to minimize adhesion formation.  The   instruments were then all removed.  The gas allowed to escape and the   incisions were closed with a deep stitch of 0 Vicryl using a UR-6 needle   followed by 4-0 subcuticular stitches of Monocryl.  The patient was then   placed in a supine position and the instruments were removed from below after   which she was awoken from general anesthesia and brought to recovery room in   stable condition.        ______________________________  MD THANH KATZ/VIVEK    DD:  05/01/2023 10:01  DT:  05/01/2023 11:30    Job#:  [id number]

## 2023-05-01 NOTE — OR NURSING
Assume care for pt in pre-op. Patient allergies and NPO status verified. Belongings secured. Patient verbalizes understanding of pain scale, expected course of stay and plan of care. Surgical site verified with patient. IV access established. Hcg negative. Call light within reach. No further needs at this time. Hourly rounding in place.

## 2023-05-01 NOTE — ANESTHESIA TIME REPORT
Anesthesia Start and Stop Event Times     Date Time Event    5/1/2023 0725 Ready for Procedure     0731 Anesthesia Start     0955 Anesthesia Stop        Responsible Staff  05/01/23    Name Role Begin End    Min MARJORIE Holbrook M.D. Anesth 0731 [id number]        Overtime Reason:  no overtime (within assigned shift)    Comments:

## 2023-05-01 NOTE — ANESTHESIA PROCEDURE NOTES
Airway    Date/Time: 5/1/2023 7:40 AM  Performed by: Tom Holbrook M.D.  Authorized by: Tom Holbrook M.D.     Location:  OR  Urgency:  Elective  Indications for Airway Management:  Anesthesia      Spontaneous Ventilation: absent    Sedation Level:  Deep  Preoxygenated: Yes    Patient Position:  Sniffing  Mask Difficulty Assessment:  1 - vent by mask  Final Airway Type:  Endotracheal airway  Final Endotracheal Airway:  ETT  Cuffed: Yes    Technique Used for Successful ETT Placement:  Direct laryngoscopy  Devices/Methods Used in Placement:  Intubating stylet    Insertion Site:  Oral  Blade Type:  Marcela  Laryngoscope Blade/Videolaryngoscope Blade Size:  3  ETT Size (mm):  7.0  Measured from:  Teeth  ETT to Teeth (cm):  21  Placement Verified by: capnometry    Cormack-Lehane Classification:  Grade IIa - partial view of glottis  Number of Attempts at Approach:  1

## 2023-05-01 NOTE — OR NURSING
Pt arrived to PACU II at 1107. Pt aa/ox4, VSS. Discharge criteria met. Pt ambulated to the bathroom and voided. C/o minimal pain to abdomen but tolerable. Abdominal incisions with steristrips binu. Pt changed into clothing with assistance. Discharge instructions given; pt and family verbalized understanding and questions answered.  IV removed, tip intact. Will follow-up with Dr. Gilbert in 1-2 weeks. This RN called [facility] and they stated that they are filling-up pt's prescription and it will be ready in an hour. Pt discharged home and escorted via w/c with RN in stable condition.

## 2023-05-01 NOTE — ANESTHESIA PREPROCEDURE EVALUATION
Case: [id number] Date/Time: 05/01/23 0730    Procedures:       HYSTEROSCOPY WITH REMOVAL OF RETAINED PRODUCTS OF CONCEPTION, LAPAROSCOPY LEFT SALPINGECTOMY WITH POSSIBLE OPEN UMBILICAL INCISION AND LEFT LOWER QUADRANT ACCESSORY POST TO ACCESS LEFT TUBE      LAPAROSCOPY      MINILAPAROTOMY    Pre-op diagnosis: RETAINED PRODUCTS OF CONCEPTION HYDROSALPINX LEFT    Location: [facility] OR 15 / SURGERY [facility]    Surgeons: Andres Gilbert M.D.          Relevant Problems      (positive) Hepatosplenomegaly      Other   (positive) Hepatosplenomegaly       Physical Exam    Airway   Mallampati: III  TM distance: >3 FB  Neck ROM: full       Cardiovascular - normal exam  Rhythm: regular  Rate: normal  (-) murmur     Dental - normal exam           Pulmonary - normal exam  Breath sounds clear to auscultation     Abdominal    Neurological - normal exam                 Anesthesia Plan    ASA 2       Plan - general       Airway plan will be LMA          Induction: intravenous    Postoperative Plan: Postoperative administration of opioids is intended.    Pertinent diagnostic labs and testing reviewed    Informed Consent:    Anesthetic plan and risks discussed with patient.    Use of blood products discussed with: patient whom consented to blood products.

## 2023-05-01 NOTE — ANESTHESIA POSTPROCEDURE EVALUATION
Patient: Brunilda Webber    Procedure Summary     Date: 05/01/23 Room / Location: [place] [address] / SURGERY [facility]    Anesthesia Start: 0731 Anesthesia Stop: 0955    Procedures:       HYSTEROSCOPY WITH REMOVAL OF RETAINED PRODUCTS OF CONCEPTION, LAPAROSCOPY LEFT SALPINGECTOMY WITH OPEN UMBILICAL LEFT LOWER QUADRANT ACCESSORY POST TO ACCESS LEFT TUBE (Abdomen)      LAPAROSCOPY (Abdomen)      MINILAPAROTOMY (Abdomen) Diagnosis: (LESIONS OF LEFT HYDROSALPINX)    Surgeons: Andres Gilbert M.D. Responsible Provider: Tom Holbrook M.D.    Anesthesia Type: general ASA Status: 1          Final Anesthesia Type: general  Last vitals  BP   Blood Pressure: 103/56    Temp   36.2 °C (97.1 °F)    Pulse   68   Resp   17    SpO2   96 %      Anesthesia Post Evaluation    Patient location during evaluation: PACU  Patient participation: complete - patient participated  Level of consciousness: awake and alert    Airway patency: patent  Anesthetic complications: no  Cardiovascular status: hemodynamically stable  Respiratory status: acceptable  Hydration status: euvolemic    PONV: none          No notable events documented.     Nurse Pain Score: 0 (NPRS)

## 2023-05-01 NOTE — ANESTHESIA PREPROCEDURE EVALUATION
Case: [id number] Anesthesia Start Date/Time: 05/01/23 0731    Procedures:       HYSTEROSCOPY WITH REMOVAL OF RETAINED PRODUCTS OF CONCEPTION, LAPAROSCOPY LEFT SALPINGECTOMY WITH OPEN UMBILICAL LEFT LOWER QUADRANT ACCESSORY POST TO ACCESS LEFT TUBE (Abdomen)      LAPAROSCOPY (Abdomen)      MINILAPAROTOMY (Abdomen)    Anesthesia type: general    Pre-op diagnosis: RETAINED PRODUCTS OF CONCEPTION HYDROSALPINX LEFT    Location: [facility] OR 15 / SURGERY [facility]    Surgeons: Andres Gilbert M.D.          Relevant Problems      (positive) Hepatosplenomegaly      Other   (positive) Hepatosplenomegaly       Physical Exam    Airway   Mallampati: II  TM distance: >3 FB  Neck ROM: full       Cardiovascular - normal exam  Rhythm: regular  Rate: normal  (-) murmur     Dental - normal exam           Pulmonary - normal exam  Breath sounds clear to auscultation     Abdominal    Neurological - normal exam                 Anesthesia Plan    ASA 1       Plan - general       Airway plan will be ETT          Induction: intravenous    Postoperative Plan: Postoperative administration of opioids is intended.    Pertinent diagnostic labs and testing reviewed    Informed Consent:    Anesthetic plan and risks discussed with patient.    Use of blood products discussed with: patient whom consented to blood products.

## 2023-05-01 NOTE — HOSPITAL COURSE
Op Note - Dictated    Admitted for HSC / LSC L hydrosalpingectomy  Surgeon VANE Gilbert MD  Anesth: Tom Hunter MD  Findings: endometrial polyps       Extensive pelvic and perihepatic adhesions       Left 6 x 4 cm hydrosalpinx with clubbed fimbria  EBL 20cc  Spec: L tube    VANE Gilbert

## 2023-05-01 NOTE — OR SURGEON
Immediate Post OP Note    PreOp Diagnosis: pelvic pain, L hydrosalpinx, retained POC        PostOp Diagnosis: endometrial polyps; extensive pelvic/perihepatic adhesions. L 6 x 4 cm hydrosalpinx      Procedure(s):  HYSTEROSCOPY WITH REMOVAL OF RETAINED PRODUCTS OF CONCEPTION, LAPAROSCOPY LEFT SALPINGECTOMY WITH POSSIBLE OPEN UMBILICAL INCISION AND LEFT LOWER QUADRANT ACCESSORY POST TO ACCESS LEFT TUBE - Wound Class: Clean Contaminated  LAPAROSCOPY - Wound Class: Clean  MINILAPAROTOMY - Wound Class: Clean    Surgeon(s):  Andres Gilbert M.D.    Anesthesiologist/Type of Anesthesia:  Anesthesiologist: Tom Holbrook M.D./General    Surgical Staff:  Circulator: Carmen Orozco R.N.  Relief Circulator: Montse Goss R.N.  Scrub Person: Tanna Fernandez; Nora Gómez    Specimens removed if any:  ID Type Source Tests Collected by Time Destination   A : LEFT fallopian tube Tissue Fallopian Tube PATHOLOGY SPECIMEN Andres Gilbert M.D. 5/1/2023  9:03 AM        Estimated Blood Loss: 20cc    Findings: polyps in uterine cavity  Extensive filmy adhesions in pelvis / perihepatic area           L hydrosalpinx    Complications: none        5/1/2023 10:14 AM Andres Gilbert M.D.

## 2023-05-01 NOTE — DISCHARGE INSTRUCTIONS
HOME CARE INSTRUCTIONS    ACTIVITY: Rest and take it easy for the first 24 hours.  A responsible adult is recommended to remain with you during that time.  It is normal to feel sleepy.  We encourage you to not do anything that requires balance, judgment or coordination.    FOR 24 HOURS DO NOT:  Drive, operate machinery or run household appliances.  Drink beer or alcoholic beverages.  Make important decisions or sign legal documents.    SPECIAL INSTRUCTIONS:     Follow up in office in 3 -4 weeks  Norco e-scribed to [facility] on [address].      Hysteroscopy, Care After  This sheet gives you information about how to care for yourself after your procedure. Your health care provider may also give you more specific instructions. If you have problems or questions, contact your health care provider.  What can I expect after the procedure?  After the procedure, it is common to have:  Cramping.  Bleeding. This can vary from light spotting to menstrual-like bleeding.  Follow these instructions at home:  Activity  Rest for 1-2 days after the procedure.  Do not douche, use tampons, or have sex for 2 weeks after the procedure, or until your health care provider approves.  Do not drive for 24 hours after the procedure, or for as long as told by your health care provider.  Do not drive, use heavy machinery, or drink alcohol while taking prescription pain medicines.  Medicines  Take over-the-counter and prescription medicines only as told by your health care provider.  Do not take aspirin during recovery. It can increase the risk of bleeding.  General instructions  Do not take baths, swim, or use a hot tub until your health care provider approves. Take showers instead of baths for 2 weeks, or for as long as told by your health care provider.  To prevent or treat constipation while you are taking prescription pain medicine, your health care provider may recommend that you:  Drink enough fluid to keep your urine clear or pale  yellow.  Take over-the-counter or prescription medicines.  Eat foods that are high in fiber, such as fresh fruits and vegetables, whole grains, and beans.  Limit foods that are high in fat and processed sugars, such as fried and sweet foods.  Keep all follow-up visits as told by your health care provider. This is important.  Contact a health care provider if:  You feel dizzy or lightheaded.  You feel nauseous.  You have abnormal vaginal discharge.  You have a rash.  You have pain that does not get better with medicine.  You have chills.  Get help right away if:  You have bleeding that is heavier than a normal menstrual period.  You have a fever.  You have pain or cramps that get worse.  You develop new abdominal pain.  You faint.  You have pain in your shoulders.  You have shortness of breath.  Summary  After the procedure, you may have cramping and some vaginal bleeding.  Do not douche, use tampons, or have sex for 2 weeks after the procedure, or until your health care provider approves.  Do not take baths, swim, or use a hot tub until your health care provider approves. Take showers instead of baths for 2 weeks, or for as long as told by your health care provider.  Report any unusual symptoms to your health care provider.  Keep all follow-up visits as told by your health care provider. This is important.    DIET: To avoid nausea, slowly advance diet as tolerated, avoiding spicy or greasy foods for the first day.  Add more substantial food to your diet according to your physician's instructions.   INCREASE FLUIDS AND FIBER TO AVOID CONSTIPATION.    SURGICAL DRESSING/BATHING: Ok to shower but no hot tubs or bathtubs or pools for 1 week.    MEDICATIONS: Resume taking daily medication.  Take prescribed pain medication with food.  If no medication is prescribed, you may take non-aspirin pain medication if needed.  PAIN MEDICATION CAN BE VERY CONSTIPATING.  Take a stool softener or laxative such as senokot, pericolace,  or milk of magnesia if needed.    Prescription for NORCO sent to pharmacy.      A follow-up appointment should be arranged with your doctor in 1-2 weeks; call to schedule.    You should CALL YOUR PHYSICIAN if you develop:  Fever greater than 101 degrees F.  Pain not relieved by medication, or persistent nausea or vomiting.  Excessive bleeding (blood soaking through dressing) or unexpected drainage from the wound.  Extreme redness or swelling around the incision site, drainage of pus or foul smelling drainage.  Inability to urinate or empty your bladder within 8 hours.  Problems with breathing or chest pain.    You should call 911 if you develop problems with breathing or chest pain.  If you are unable to contact your doctor or surgical center, you should go to the nearest emergency room or urgent care center.  Physician's telephone #: Dr. Gilbert ([phone])    MILD FLU-LIKE SYMPTOMS ARE NORMAL.  YOU MAY EXPERIENCE GENERALIZED MUSCLE ACHES, THROAT IRRITATION, HEADACHE AND/OR SOME NAUSEA.    If any questions arise, call your doctor.  If your doctor is not available, please feel free to call the Surgical Center at [phone].  The Center is open Monday through Friday from 7AM to 7PM.      A registered nurse may call you a few days after your surgery to see how you are doing after your procedure.    You may also receive a survey in the mail within the next two weeks and we ask that you take a few moments to complete the survey and return it to us.  Our goal is to provide you with very good care and we value your comments.     Depression / Suicide Risk    As you are discharged from this [facility] facility, it is important to learn how to keep safe from harming yourself.    Recognize the warning signs:  Abrupt changes in personality, positive or negative- including increase in energy   Giving away possessions  Change in eating patterns- significant weight changes-  positive or negative  Change in sleeping  patterns- unable to sleep or sleeping all the time   Unwillingness or inability to communicate  Depression  Unusual sadness, discouragement and loneliness  Talk of wanting to die  Neglect of personal appearance   Rebelliousness- reckless behavior  Withdrawal from people/activities they love  Confusion- inability to concentrate     If you or a loved one observes any of these behaviors or has concerns about self-harm, here's what you can do:  Talk about it- your feelings and reasons for harming yourself  Remove any means that you might use to hurt yourself (examples: pills, rope, extension cords, firearm)  Get professional help from the community (Mental Health, Substance Abuse, psychological counseling)  Do not be alone:Call your Safe Contact- someone whom you trust who will be there for you.  Call your local CRISIS HOTLINE 998-7509 or 374-063-1807  Call your local Children's Mobile Crisis Response Team [place] (665) 234-9567 or www.ishBowl  Call the toll free National Suicide Prevention Hotlines   National Suicide Prevention Lifeline 344-364-QPIV (3809)  National Hope Line Network 800-SUICIDE (354-8593)    I acknowledge receipt and understanding of these Home Care instructions.

## 2023-10-19 ENCOUNTER — HOSPITAL ENCOUNTER (EMERGENCY)
Facility: MEDICAL CENTER | Age: 33
End: 2023-10-19
Attending: EMERGENCY MEDICINE
Payer: COMMERCIAL

## 2023-10-19 ENCOUNTER — APPOINTMENT (OUTPATIENT)
Dept: RADIOLOGY | Facility: MEDICAL CENTER | Age: 33
End: 2023-10-19
Attending: EMERGENCY MEDICINE
Payer: COMMERCIAL

## 2023-10-19 VITALS
WEIGHT: 196.65 LBS | SYSTOLIC BLOOD PRESSURE: 103 MMHG | BODY MASS INDEX: 30.87 KG/M2 | RESPIRATION RATE: 16 BRPM | TEMPERATURE: 98.7 F | OXYGEN SATURATION: 98 % | HEIGHT: 67 IN | HEART RATE: 74 BPM | DIASTOLIC BLOOD PRESSURE: 65 MMHG

## 2023-10-19 DIAGNOSIS — R10.33 PERIUMBILICAL ABDOMINAL PAIN: ICD-10-CM

## 2023-10-19 DIAGNOSIS — Z3A.17 17 WEEKS GESTATION OF PREGNANCY: ICD-10-CM

## 2023-10-19 LAB
ALBUMIN SERPL BCP-MCNC: 3.8 G/DL (ref 3.2–4.9)
ALBUMIN/GLOB SERPL: 1.4 G/DL
ALP SERPL-CCNC: 44 U/L (ref 30–99)
ALT SERPL-CCNC: 12 U/L (ref 2–50)
ANION GAP SERPL CALC-SCNC: 12 MMOL/L (ref 7–16)
APPEARANCE UR: CLEAR
AST SERPL-CCNC: 17 U/L (ref 12–45)
BASOPHILS # BLD AUTO: 0.3 % (ref 0–1.8)
BASOPHILS # BLD: 0.03 K/UL (ref 0–0.12)
BILIRUB SERPL-MCNC: 0.3 MG/DL (ref 0.1–1.5)
BILIRUB UR QL STRIP.AUTO: NEGATIVE
BUN SERPL-MCNC: 5 MG/DL (ref 8–22)
CALCIUM ALBUM COR SERPL-MCNC: 8.9 MG/DL (ref 8.5–10.5)
CALCIUM SERPL-MCNC: 8.7 MG/DL (ref 8.4–10.2)
CHLORIDE SERPL-SCNC: 101 MMOL/L (ref 96–112)
CO2 SERPL-SCNC: 22 MMOL/L (ref 20–33)
COLOR UR: YELLOW
CREAT SERPL-MCNC: 0.59 MG/DL (ref 0.5–1.4)
EOSINOPHIL # BLD AUTO: 0.08 K/UL (ref 0–0.51)
EOSINOPHIL NFR BLD: 0.8 % (ref 0–6.9)
ERYTHROCYTE [DISTWIDTH] IN BLOOD BY AUTOMATED COUNT: 45 FL (ref 35.9–50)
GFR SERPLBLD CREATININE-BSD FMLA CKD-EPI: 123 ML/MIN/1.73 M 2
GLOBULIN SER CALC-MCNC: 2.8 G/DL (ref 1.9–3.5)
GLUCOSE SERPL-MCNC: 102 MG/DL (ref 65–99)
GLUCOSE UR STRIP.AUTO-MCNC: NEGATIVE MG/DL
HCT VFR BLD AUTO: 38.4 % (ref 37–47)
HGB BLD-MCNC: 13.2 G/DL (ref 12–16)
IMM GRANULOCYTES # BLD AUTO: 0.05 K/UL (ref 0–0.11)
IMM GRANULOCYTES NFR BLD AUTO: 0.5 % (ref 0–0.9)
KETONES UR STRIP.AUTO-MCNC: NEGATIVE MG/DL
LEUKOCYTE ESTERASE UR QL STRIP.AUTO: NEGATIVE
LIPASE SERPL-CCNC: 30 U/L (ref 11–82)
LYMPHOCYTES # BLD AUTO: 2.57 K/UL (ref 1–4.8)
LYMPHOCYTES NFR BLD: 24.8 % (ref 22–41)
MCH RBC QN AUTO: 29.7 PG (ref 27–33)
MCHC RBC AUTO-ENTMCNC: 34.4 G/DL (ref 32.2–35.5)
MCV RBC AUTO: 86.5 FL (ref 81.4–97.8)
MICRO URNS: NORMAL
MONOCYTES # BLD AUTO: 0.55 K/UL (ref 0–0.85)
MONOCYTES NFR BLD AUTO: 5.3 % (ref 0–13.4)
NEUTROPHILS # BLD AUTO: 7.07 K/UL (ref 1.82–7.42)
NEUTROPHILS NFR BLD: 68.3 % (ref 44–72)
NITRITE UR QL STRIP.AUTO: NEGATIVE
NRBC # BLD AUTO: 0 K/UL
NRBC BLD-RTO: 0 /100 WBC (ref 0–0.2)
PH UR STRIP.AUTO: 7 [PH] (ref 5–8)
PLATELET # BLD AUTO: 213 K/UL (ref 164–446)
PMV BLD AUTO: 9.2 FL (ref 9–12.9)
POTASSIUM SERPL-SCNC: 4.1 MMOL/L (ref 3.6–5.5)
PROT SERPL-MCNC: 6.6 G/DL (ref 6–8.2)
PROT UR QL STRIP: NEGATIVE MG/DL
RBC # BLD AUTO: 4.44 M/UL (ref 4.2–5.4)
RBC UR QL AUTO: NEGATIVE
SODIUM SERPL-SCNC: 135 MMOL/L (ref 135–145)
SP GR UR STRIP.AUTO: <=1.005
WBC # BLD AUTO: 10.4 K/UL (ref 4.8–10.8)

## 2023-10-19 PROCEDURE — 700102 HCHG RX REV CODE 250 W/ 637 OVERRIDE(OP): Performed by: EMERGENCY MEDICINE

## 2023-10-19 PROCEDURE — 36415 COLL VENOUS BLD VENIPUNCTURE: CPT

## 2023-10-19 PROCEDURE — 99284 EMERGENCY DEPT VISIT MOD MDM: CPT

## 2023-10-19 PROCEDURE — A9270 NON-COVERED ITEM OR SERVICE: HCPCS | Performed by: EMERGENCY MEDICINE

## 2023-10-19 PROCEDURE — 83690 ASSAY OF LIPASE: CPT

## 2023-10-19 PROCEDURE — 81003 URINALYSIS AUTO W/O SCOPE: CPT

## 2023-10-19 PROCEDURE — 80053 COMPREHEN METABOLIC PANEL: CPT

## 2023-10-19 PROCEDURE — 85025 COMPLETE CBC W/AUTO DIFF WBC: CPT

## 2023-10-19 PROCEDURE — 76817 TRANSVAGINAL US OBSTETRIC: CPT

## 2023-10-19 RX ORDER — ACETAMINOPHEN 500 MG
1000 TABLET ORAL ONCE
Status: COMPLETED | OUTPATIENT
Start: 2023-10-19 | End: 2023-10-19

## 2023-10-19 RX ADMIN — ACETAMINOPHEN 1000 MG: 500 TABLET, FILM COATED ORAL at 20:18

## 2023-10-19 ASSESSMENT — FIBROSIS 4 INDEX: FIB4 SCORE: 0.68

## 2023-10-19 ASSESSMENT — PAIN DESCRIPTION - PAIN TYPE: TYPE: ACUTE PAIN

## 2023-10-20 NOTE — ED TRIAGE NOTES
"Chief Complaint   Patient presents with    Abdominal Pain    Nausea     32 yo female ambulates to triage with reports of right mid abdominal pain since this AM.  + nausea and reports unable to eat.  Patient reports she is 17 weeks pregnant as well.  Reports pain as a stabbing pain.  Denies vaginal bleeding.      /80   Pulse 72   Temp 37.1 °C (98.7 °F) (Temporal)   Resp 18   Ht 1.702 m (5' 7\")   Wt 89.2 kg (196 lb 10.4 oz)   LMP 06/13/2023   SpO2 98%   BMI 30.80 kg/m²    "

## 2023-10-20 NOTE — ED PROVIDER NOTES
ED Provider Note        CHIEF COMPLAINT  Chief Complaint   Patient presents with    Abdominal Pain    Nausea     34 yo female ambulates to triage with reports of right mid abdominal pain since this AM.  + nausea and reports unable to eat.  Patient reports she is 17 weeks pregnant as well.  Reports pain as a stabbing pain.  Denies vaginal bleeding.           HPI    Brunilda Webber is a 33 y.o. female who presents to the Emergency Department with abdominal pain.  The patient is G4, P1 at approximately 17 weeks after IVF.  She reports that she had onset of right abdominal pain around 10 AM, which markedly worsened.  The pain has been consistent throughout the day, worse with pressing.  She endorses 2 weeks of nausea but no vomiting.  She has had recent onset of nonbloody diarrhea.  Denies any fever, dysuria, hematuria, abnormal vaginal discharge, vaginal bleeding.  Patient had a history of burst appendix which caused her first miscarriage, and hydrosalpinx which caused her second miscarriage.  She does not have appendix or fallopian tubes.    REVIEW OF SYSTEMS  See HPI for further details. All other systems are negative.     PAST MEDICAL HISTORY     Past Medical History:   Diagnosis Date    Bowel habit changes     constipation    Vitamin D deficiency        SURGICAL HISTORY  Past Surgical History:   Procedure Laterality Date    WI HYSTEROSCOPY,DX,SEP PROC  5/1/2023    Procedure: HYSTEROSCOPY WITH REMOVAL OF RETAINED PRODUCTS OF CONCEPTION, LAPAROSCOPY LEFT SALPINGECTOMY WITH OPEN UMBILICAL LEFT LOWER QUADRANT ACCESSORY POST TO ACCESS LEFT TUBE;  Surgeon: Andres Gilbert M.D.;  Location: [facility];  Service: Gynecology    WI LAP,DIAGNOSTIC ABDOMEN  5/1/2023    Procedure: LAPAROSCOPY;  Surgeon: Andres Gilbert M.D.;  Location: [facility];  Service: Gynecology    WI EXPLORATORY OF ABDOMEN  5/1/2023    Procedure: MINILAPAROTOMY;  Surgeon: Andres Gilbert M.D.;  Location: [facility];  Service:  "Gynecology    NV LAP,RMV  ADNEXAL STRUCTURE Right 2022    Procedure: SALPINGECTOMY - LAPROSCOPIC - ECTOPIC;  Surgeon: Ismael Stevenson M.D.;  Location: SURGERY SAME DAY [facility];  Service: Gynecology    NV LAP,DIAGNOSTIC ABDOMEN N/A 2022    Procedure: PELVISCOPY;  Surgeon: Ismael Stevenson M.D.;  Location: SURGERY SAME DAY [facility];  Service: Gynecology    NV LAP,APPENDECTOMY N/A 2022    Procedure: APPENDECTOMY, LAPAROSCOPIC;  Surgeon: Ismael Stevenson M.D.;  Location: SURGERY SAME DAY [facility];  Service: Gynecology    PRIMARY C SECTION N/A 3/18/2021    Procedure:  SECTION, PRIMARY;  Surgeon: Esperanza Fuentes M.D.;  Location: SURGERY LABOR AND DELIVERY;  Service: Labor and Delivery       FAMILY HISTORY  Family History   Problem Relation Age of Onset    Thyroid Mother     Cancer Father         skin    Cancer Maternal Grandmother         breast    Heart Disease Maternal Grandfather 70        mi    Cancer Paternal Grandmother         lung in smoker       SOCIAL HISTORY    reports that she has never smoked. She has never used smokeless tobacco. She reports that she does not currently use alcohol. She reports that she does not use drugs.    CURRENT MEDICATIONS  Home Medications       Reviewed by Kamryn Damon R.N. (Registered Nurse) on 10/19/23 at 1804  Med List Status: Not Addressed     Medication Last Dose Status   Cholecalciferol (VITAMIN D3) 50 MCG ( UT) Tab  Active   Magnesium 500 MG Cap  Active   NON SPECIFIED  Active                    ALLERGIES  Allergies   Allergen Reactions    Onion Hives and Swelling     Patient states \"throat porter closes\"       PHYSICAL EXAM  VITAL SIGNS: /65   Pulse 74   Temp 37.1 °C (98.7 °F) (Temporal)   Resp 16   Ht 1.702 m (5' 7\")   Wt 89.2 kg (196 lb 10.4 oz)   LMP 2023   SpO2 98%   BMI 30.80 kg/m²   Gen: Alert, no acute distress  HEENT: ATNC  Eyes: PERRL, EOMI, normal conjunctiva  Neck: trachea midline  Resp: no respiratory " distress  CV: No JVD, regular rate and rhythm  Abd: non-distended, soft, right mid abdomen tenderness.  No erythema.  Gravid.  : No CVAT  Ext: No deformities  Neuro: speech fluent    DIAGNOSTIC STUDIES / PROCEDURES    LABS  Labs Reviewed   COMP METABOLIC PANEL - Abnormal; Notable for the following components:       Result Value    Glucose 102 (*)     Bun 5 (*)     All other components within normal limits   CBC WITH DIFFERENTIAL   LIPASE   URINALYSIS    Narrative:     Release to patient->Immediate   ESTIMATED GFR         RADIOLOGY  I have independently interpreted the diagnostic imaging associated with this visit:  Ultrasound OB: Intrauterine pregnancy    US-OB LIMITED WITH TRANSVAGINAL (COMBO)   Final Result         1.  Single intrauterine pregnancy of an estimated gestational age of 17 weeks, 5 days with an estimated date of delivery of 03/23/2024.   2.  Marginal placenta previa          COURSE & MEDICAL DECISION MAKING  Pertinent Labs & Imaging studies were reviewed. (See chart for details)    ED Observation Status? Yes  Patient admitted to ED observation at 8:09 PM on 10/19/2023    Observation plan: Follow-up labs, imaging, determination of disposition, potential OB or surgical intervention    After observation the patient is improved and will be discharged  Patient discharged from ED Observation at 10:55 PM on 10/19/2023    EXTERNAL RECORDS REVIEWED  Inpatient Notes hospitalized 8/22/2022.  Noted to have history of IVF complicated by tubal and intrauterine pregnancy status post salpingectomy with acute appendicitis noted intraoperatively, status post appendectomy, admitted for septic shock secondary to pelvic abscess      INITIAL ASSESSMENT AND PLAN  Care Narrative: Pregnant patient presents with right-sided abdominal pain and tenderness.  Labs shows no leukocytosis, no LFT abnormalities, no evidence of pancreatitis.  She is status post salpingectomy, as well as status post appendectomy.  No evidence of acute  cholecystitis, ascending cholangitis.    Urinalysis negative for ureterolithiasis, urinary tract infection.  Ultrasound demonstrates no abnormalities in the region of the pain, normal OB evaluation, no obvious adnexal abnormalities.    At this point exact cause of the patient's dull pain is unclear, however does not appear to be dangerous.  She is advised that if she has any new symptoms, if the symptoms do not improve within 24 hours, or any worsening of her  state to present to the [facility] as our MRI is not currently working for further imaging.      ADDITIONAL PROBLEM LIST AND DISPOSITION      Escalation of care considered, and ultimately not performed: acute inpatient care management, however at this time, the patient is most appropriate for outpatient management.         Patient is referred to primary care provider for blood pressure, diabetes and all other preventative health services.  Patient was given return precautions, anticipatory guidance, and the opportunity ask questions prior to discharge        FINAL IMPRESSION  1. Periumbilical abdominal pain    2. 17 weeks gestation of pregnancy           DISPOSITION:  Patient will be discharged home in stable condition.    FOLLOW UP:  Cecilia Mclean, SHAHEEN.P.N.  [id number] [facility] [address]  [address]  [facility] [id number] [phone]    Schedule an appointment as soon as possible for a visit       Your Ob/Gyn    Schedule an appointment as soon as possible for a visit       [facility]  [address] [place] [id number] [phone]    If symptoms worsen        This dictation was created using voice recognition software. The accuracy of the dictation is limited to the abilities of the software. I expect there may be some errors of grammar and possibly content. The nursing notes were reviewed and certain aspects of this information were incorporated into this note.

## 2023-10-20 NOTE — DISCHARGE INSTRUCTIONS
You were seen emerged part for abdominal pain.  Your blood work, urinalysis, and ultrasound were all reassuring.  The exact cause of your pain is not clear but at this time does not appear to represent a dangerous cause.  Please follow with your regular doctor and your OB/GYN.    Please return to the emergency department or seek medical attention if you develop:  Fever, vomiting, worsening symptoms, no improvement in your symptoms in 24 hours, vaginal bleeding, any other new or concerning findings

## 2024-02-05 ENCOUNTER — APPOINTMENT (OUTPATIENT)
Dept: ADMISSIONS | Facility: MEDICAL CENTER | Age: 34
End: 2024-02-05
Attending: OBSTETRICS & GYNECOLOGY
Payer: COMMERCIAL

## 2024-02-22 ENCOUNTER — PRE-ADMISSION TESTING (OUTPATIENT)
Dept: ADMISSIONS | Facility: MEDICAL CENTER | Age: 34
End: 2024-02-22
Attending: OBSTETRICS & GYNECOLOGY
Payer: COMMERCIAL

## 2024-02-22 RX ORDER — ASPIRIN 81 MG/1
81 TABLET ORAL DAILY
Status: ON HOLD | COMMUNITY
End: 2024-03-17

## 2024-03-16 ENCOUNTER — ANESTHESIA EVENT (OUTPATIENT)
Dept: OBGYN | Facility: MEDICAL CENTER | Age: 34
End: 2024-03-16
Payer: COMMERCIAL

## 2024-03-16 ENCOUNTER — ANESTHESIA (OUTPATIENT)
Dept: OBGYN | Facility: MEDICAL CENTER | Age: 34
End: 2024-03-16
Payer: COMMERCIAL

## 2024-03-16 ENCOUNTER — HOSPITAL ENCOUNTER (INPATIENT)
Facility: MEDICAL CENTER | Age: 34
LOS: 1 days | End: 2024-03-17
Attending: OBSTETRICS & GYNECOLOGY | Admitting: OBSTETRICS & GYNECOLOGY
Payer: COMMERCIAL

## 2024-03-16 DIAGNOSIS — G89.18 POSTOPERATIVE PAIN: ICD-10-CM

## 2024-03-16 LAB
BASOPHILS # BLD AUTO: 0.3 % (ref 0–1.8)
BASOPHILS # BLD: 0.03 K/UL (ref 0–0.12)
EOSINOPHIL # BLD AUTO: 0.08 K/UL (ref 0–0.51)
EOSINOPHIL NFR BLD: 0.9 % (ref 0–6.9)
ERYTHROCYTE [DISTWIDTH] IN BLOOD BY AUTOMATED COUNT: 43.3 FL (ref 35.9–50)
ERYTHROCYTE [DISTWIDTH] IN BLOOD BY AUTOMATED COUNT: 44.3 FL (ref 35.9–50)
ERYTHROCYTE [DISTWIDTH] IN BLOOD BY AUTOMATED COUNT: 44.6 FL (ref 35.9–50)
HCT VFR BLD AUTO: 32.1 % (ref 37–47)
HCT VFR BLD AUTO: 37.6 % (ref 37–47)
HCT VFR BLD AUTO: 39.3 % (ref 37–47)
HGB BLD-MCNC: 11.1 G/DL (ref 12–16)
HGB BLD-MCNC: 13.3 G/DL (ref 12–16)
HGB BLD-MCNC: 13.9 G/DL (ref 12–16)
HOLDING TUBE BB 8507: NORMAL
IMM GRANULOCYTES # BLD AUTO: 0.11 K/UL (ref 0–0.11)
IMM GRANULOCYTES NFR BLD AUTO: 1.2 % (ref 0–0.9)
LYMPHOCYTES # BLD AUTO: 1.77 K/UL (ref 1–4.8)
LYMPHOCYTES NFR BLD: 19.3 % (ref 22–41)
MCH RBC QN AUTO: 30 PG (ref 27–33)
MCH RBC QN AUTO: 30.5 PG (ref 27–33)
MCH RBC QN AUTO: 30.8 PG (ref 27–33)
MCHC RBC AUTO-ENTMCNC: 34.6 G/DL (ref 32.2–35.5)
MCHC RBC AUTO-ENTMCNC: 35.4 G/DL (ref 32.2–35.5)
MCHC RBC AUTO-ENTMCNC: 35.4 G/DL (ref 32.2–35.5)
MCV RBC AUTO: 86.2 FL (ref 81.4–97.8)
MCV RBC AUTO: 86.8 FL (ref 81.4–97.8)
MCV RBC AUTO: 87 FL (ref 81.4–97.8)
MONOCYTES # BLD AUTO: 0.6 K/UL (ref 0–0.85)
MONOCYTES NFR BLD AUTO: 6.6 % (ref 0–13.4)
NEUTROPHILS # BLD AUTO: 6.57 K/UL (ref 1.82–7.42)
NEUTROPHILS NFR BLD: 71.7 % (ref 44–72)
NRBC # BLD AUTO: 0 K/UL
NRBC BLD-RTO: 0 /100 WBC (ref 0–0.2)
PLATELET # BLD AUTO: 158 K/UL (ref 164–446)
PLATELET # BLD AUTO: 165 K/UL (ref 164–446)
PLATELET # BLD AUTO: 181 K/UL (ref 164–446)
PMV BLD AUTO: 9.5 FL (ref 9–12.9)
PMV BLD AUTO: 9.7 FL (ref 9–12.9)
PMV BLD AUTO: 9.8 FL (ref 9–12.9)
RBC # BLD AUTO: 3.7 M/UL (ref 4.2–5.4)
RBC # BLD AUTO: 4.32 M/UL (ref 4.2–5.4)
RBC # BLD AUTO: 4.56 M/UL (ref 4.2–5.4)
T PALLIDUM AB SER QL IA: NORMAL
WBC # BLD AUTO: 17.4 K/UL (ref 4.8–10.8)
WBC # BLD AUTO: 18.8 K/UL (ref 4.8–10.8)
WBC # BLD AUTO: 9.2 K/UL (ref 4.8–10.8)

## 2024-03-16 PROCEDURE — 700111 HCHG RX REV CODE 636 W/ 250 OVERRIDE (IP): Mod: JZ | Performed by: INTERNAL MEDICINE

## 2024-03-16 PROCEDURE — 700105 HCHG RX REV CODE 258: Performed by: OBSTETRICS & GYNECOLOGY

## 2024-03-16 PROCEDURE — 36415 COLL VENOUS BLD VENIPUNCTURE: CPT

## 2024-03-16 PROCEDURE — 160035 HCHG PACU - 1ST 60 MINS PHASE I: Performed by: OBSTETRICS & GYNECOLOGY

## 2024-03-16 PROCEDURE — C1755 CATHETER, INTRASPINAL: HCPCS | Performed by: OBSTETRICS & GYNECOLOGY

## 2024-03-16 PROCEDURE — 160041 HCHG SURGERY MINUTES - EA ADDL 1 MIN LEVEL 4: Performed by: OBSTETRICS & GYNECOLOGY

## 2024-03-16 PROCEDURE — 160009 HCHG ANES TIME/MIN: Performed by: OBSTETRICS & GYNECOLOGY

## 2024-03-16 PROCEDURE — A9270 NON-COVERED ITEM OR SERVICE: HCPCS | Performed by: INTERNAL MEDICINE

## 2024-03-16 PROCEDURE — 770002 HCHG ROOM/CARE - OB PRIVATE (112)

## 2024-03-16 PROCEDURE — 160002 HCHG RECOVERY MINUTES (STAT): Performed by: OBSTETRICS & GYNECOLOGY

## 2024-03-16 PROCEDURE — A9270 NON-COVERED ITEM OR SERVICE: HCPCS | Performed by: OBSTETRICS & GYNECOLOGY

## 2024-03-16 PROCEDURE — 700102 HCHG RX REV CODE 250 W/ 637 OVERRIDE(OP): Performed by: INTERNAL MEDICINE

## 2024-03-16 PROCEDURE — 700105 HCHG RX REV CODE 258: Performed by: INTERNAL MEDICINE

## 2024-03-16 PROCEDURE — 85027 COMPLETE CBC AUTOMATED: CPT

## 2024-03-16 PROCEDURE — 86780 TREPONEMA PALLIDUM: CPT

## 2024-03-16 PROCEDURE — A9270 NON-COVERED ITEM OR SERVICE: HCPCS

## 2024-03-16 PROCEDURE — 700101 HCHG RX REV CODE 250: Performed by: OBSTETRICS & GYNECOLOGY

## 2024-03-16 PROCEDURE — 160029 HCHG SURGERY MINUTES - 1ST 30 MINS LEVEL 4: Performed by: OBSTETRICS & GYNECOLOGY

## 2024-03-16 PROCEDURE — 700102 HCHG RX REV CODE 250 W/ 637 OVERRIDE(OP): Performed by: OBSTETRICS & GYNECOLOGY

## 2024-03-16 PROCEDURE — 700111 HCHG RX REV CODE 636 W/ 250 OVERRIDE (IP): Mod: JZ | Performed by: OBSTETRICS & GYNECOLOGY

## 2024-03-16 PROCEDURE — 700111 HCHG RX REV CODE 636 W/ 250 OVERRIDE (IP): Performed by: OBSTETRICS & GYNECOLOGY

## 2024-03-16 PROCEDURE — 160048 HCHG OR STATISTICAL LEVEL 1-5: Performed by: OBSTETRICS & GYNECOLOGY

## 2024-03-16 PROCEDURE — 700102 HCHG RX REV CODE 250 W/ 637 OVERRIDE(OP)

## 2024-03-16 PROCEDURE — 85025 COMPLETE CBC W/AUTO DIFF WBC: CPT

## 2024-03-16 PROCEDURE — 10D17ZZ EXTRACTION OF PRODUCTS OF CONCEPTION, RETAINED, VIA NATURAL OR ARTIFICIAL OPENING: ICD-10-PCS | Performed by: OBSTETRICS & GYNECOLOGY

## 2024-03-16 RX ORDER — HYDROMORPHONE HYDROCHLORIDE 1 MG/ML
0.2 INJECTION, SOLUTION INTRAMUSCULAR; INTRAVENOUS; SUBCUTANEOUS
Status: ACTIVE | OUTPATIENT
Start: 2024-03-16 | End: 2024-03-17

## 2024-03-16 RX ORDER — HYDROMORPHONE HYDROCHLORIDE 1 MG/ML
0.1 INJECTION, SOLUTION INTRAMUSCULAR; INTRAVENOUS; SUBCUTANEOUS
Status: DISCONTINUED | OUTPATIENT
Start: 2024-03-16 | End: 2024-03-16 | Stop reason: HOSPADM

## 2024-03-16 RX ORDER — MISOPROSTOL 200 UG/1
TABLET ORAL
Status: COMPLETED
Start: 2024-03-16 | End: 2024-03-16

## 2024-03-16 RX ORDER — DIPHENHYDRAMINE HYDROCHLORIDE 50 MG/ML
25 INJECTION INTRAMUSCULAR; INTRAVENOUS EVERY 6 HOURS PRN
Status: ACTIVE | OUTPATIENT
Start: 2024-03-16 | End: 2024-03-17

## 2024-03-16 RX ORDER — METHYLERGONOVINE MALEATE 0.2 MG/ML
0.2 INJECTION INTRAVENOUS ONCE
Status: COMPLETED | OUTPATIENT
Start: 2024-03-16 | End: 2024-03-16

## 2024-03-16 RX ORDER — EPHEDRINE SULFATE 50 MG/ML
10 INJECTION, SOLUTION INTRAVENOUS
Status: ACTIVE | OUTPATIENT
Start: 2024-03-16 | End: 2024-03-17

## 2024-03-16 RX ORDER — SODIUM CHLORIDE, SODIUM LACTATE, POTASSIUM CHLORIDE, CALCIUM CHLORIDE 600; 310; 30; 20 MG/100ML; MG/100ML; MG/100ML; MG/100ML
INJECTION, SOLUTION INTRAVENOUS PRN
Status: DISCONTINUED | OUTPATIENT
Start: 2024-03-16 | End: 2024-03-17 | Stop reason: HOSPADM

## 2024-03-16 RX ORDER — SODIUM CHLORIDE, SODIUM LACTATE, POTASSIUM CHLORIDE, AND CALCIUM CHLORIDE .6; .31; .03; .02 G/100ML; G/100ML; G/100ML; G/100ML
1000 INJECTION, SOLUTION INTRAVENOUS ONCE
Status: COMPLETED | OUTPATIENT
Start: 2024-03-16 | End: 2024-03-16

## 2024-03-16 RX ORDER — HYDROMORPHONE HYDROCHLORIDE 1 MG/ML
0.4 INJECTION, SOLUTION INTRAMUSCULAR; INTRAVENOUS; SUBCUTANEOUS
Status: DISCONTINUED | OUTPATIENT
Start: 2024-03-16 | End: 2024-03-16 | Stop reason: HOSPADM

## 2024-03-16 RX ORDER — OXYCODONE HYDROCHLORIDE 10 MG/1
10 TABLET ORAL EVERY 4 HOURS PRN
Status: ACTIVE | OUTPATIENT
Start: 2024-03-16 | End: 2024-03-17

## 2024-03-16 RX ORDER — CARBOPROST TROMETHAMINE 250 UG/ML
250 INJECTION, SOLUTION INTRAMUSCULAR ONCE
Status: COMPLETED | OUTPATIENT
Start: 2024-03-16 | End: 2024-03-16

## 2024-03-16 RX ORDER — DIPHENHYDRAMINE HYDROCHLORIDE 50 MG/ML
12.5 INJECTION INTRAMUSCULAR; INTRAVENOUS
Status: DISCONTINUED | OUTPATIENT
Start: 2024-03-16 | End: 2024-03-16 | Stop reason: HOSPADM

## 2024-03-16 RX ORDER — IBUPROFEN 800 MG/1
800 TABLET ORAL EVERY 8 HOURS PRN
Status: DISCONTINUED | OUTPATIENT
Start: 2024-03-20 | End: 2024-03-17 | Stop reason: HOSPADM

## 2024-03-16 RX ORDER — DIPHENHYDRAMINE HYDROCHLORIDE 50 MG/ML
12.5 INJECTION INTRAMUSCULAR; INTRAVENOUS EVERY 6 HOURS PRN
Status: ACTIVE | OUTPATIENT
Start: 2024-03-16 | End: 2024-03-17

## 2024-03-16 RX ORDER — OXYCODONE HYDROCHLORIDE 10 MG/1
10 TABLET ORAL EVERY 4 HOURS PRN
Status: DISCONTINUED | OUTPATIENT
Start: 2024-03-17 | End: 2024-03-17 | Stop reason: HOSPADM

## 2024-03-16 RX ORDER — ONDANSETRON 2 MG/ML
4 INJECTION INTRAMUSCULAR; INTRAVENOUS
Status: DISCONTINUED | OUTPATIENT
Start: 2024-03-16 | End: 2024-03-16 | Stop reason: HOSPADM

## 2024-03-16 RX ORDER — DOCUSATE SODIUM 100 MG/1
100 CAPSULE, LIQUID FILLED ORAL 2 TIMES DAILY PRN
Status: DISCONTINUED | OUTPATIENT
Start: 2024-03-16 | End: 2024-03-17 | Stop reason: HOSPADM

## 2024-03-16 RX ORDER — METOCLOPRAMIDE HYDROCHLORIDE 5 MG/ML
10 INJECTION INTRAMUSCULAR; INTRAVENOUS ONCE
Status: COMPLETED | OUTPATIENT
Start: 2024-03-16 | End: 2024-03-16

## 2024-03-16 RX ORDER — ACETAMINOPHEN 500 MG
1000 TABLET ORAL EVERY 6 HOURS
Qty: 24 TABLET | Refills: 0 | Status: DISCONTINUED | OUTPATIENT
Start: 2024-03-17 | End: 2024-03-17 | Stop reason: HOSPADM

## 2024-03-16 RX ORDER — CEFAZOLIN SODIUM 1 G/3ML
2 INJECTION, POWDER, FOR SOLUTION INTRAMUSCULAR; INTRAVENOUS ONCE
Status: COMPLETED | OUTPATIENT
Start: 2024-03-16 | End: 2024-03-16

## 2024-03-16 RX ORDER — DEXAMETHASONE SODIUM PHOSPHATE 4 MG/ML
INJECTION, SOLUTION INTRA-ARTICULAR; INTRALESIONAL; INTRAMUSCULAR; INTRAVENOUS; SOFT TISSUE PRN
Status: DISCONTINUED | OUTPATIENT
Start: 2024-03-16 | End: 2024-03-16 | Stop reason: SURG

## 2024-03-16 RX ORDER — SODIUM CHLORIDE, SODIUM LACTATE, POTASSIUM CHLORIDE, CALCIUM CHLORIDE 600; 310; 30; 20 MG/100ML; MG/100ML; MG/100ML; MG/100ML
INJECTION, SOLUTION INTRAVENOUS CONTINUOUS
Status: DISCONTINUED | OUTPATIENT
Start: 2024-03-16 | End: 2024-03-17

## 2024-03-16 RX ORDER — ONDANSETRON 2 MG/ML
INJECTION INTRAMUSCULAR; INTRAVENOUS PRN
Status: DISCONTINUED | OUTPATIENT
Start: 2024-03-16 | End: 2024-03-16 | Stop reason: SURG

## 2024-03-16 RX ORDER — CALCIUM CARBONATE 500 MG/1
1000 TABLET, CHEWABLE ORAL EVERY 6 HOURS PRN
Status: DISCONTINUED | OUTPATIENT
Start: 2024-03-16 | End: 2024-03-17 | Stop reason: HOSPADM

## 2024-03-16 RX ORDER — OXYCODONE HYDROCHLORIDE 5 MG/1
5 TABLET ORAL EVERY 4 HOURS PRN
Status: ACTIVE | OUTPATIENT
Start: 2024-03-16 | End: 2024-03-17

## 2024-03-16 RX ORDER — IBUPROFEN 800 MG/1
800 TABLET ORAL EVERY 8 HOURS
Qty: 9 TABLET | Refills: 0 | Status: DISCONTINUED | OUTPATIENT
Start: 2024-03-17 | End: 2024-03-17 | Stop reason: HOSPADM

## 2024-03-16 RX ORDER — HYDRALAZINE HYDROCHLORIDE 20 MG/ML
5 INJECTION INTRAMUSCULAR; INTRAVENOUS
Status: DISCONTINUED | OUTPATIENT
Start: 2024-03-16 | End: 2024-03-16 | Stop reason: HOSPADM

## 2024-03-16 RX ORDER — HYDROMORPHONE HYDROCHLORIDE 1 MG/ML
0.4 INJECTION, SOLUTION INTRAMUSCULAR; INTRAVENOUS; SUBCUTANEOUS
Status: ACTIVE | OUTPATIENT
Start: 2024-03-16 | End: 2024-03-17

## 2024-03-16 RX ORDER — OXYTOCIN 10 [USP'U]/ML
10 INJECTION, SOLUTION INTRAMUSCULAR; INTRAVENOUS
Status: DISCONTINUED | OUTPATIENT
Start: 2024-03-16 | End: 2024-03-16

## 2024-03-16 RX ORDER — MEPERIDINE HYDROCHLORIDE 25 MG/ML
12.5 INJECTION INTRAMUSCULAR; INTRAVENOUS; SUBCUTANEOUS
Status: DISCONTINUED | OUTPATIENT
Start: 2024-03-16 | End: 2024-03-16 | Stop reason: HOSPADM

## 2024-03-16 RX ORDER — LABETALOL HYDROCHLORIDE 5 MG/ML
5 INJECTION, SOLUTION INTRAVENOUS
Status: DISCONTINUED | OUTPATIENT
Start: 2024-03-16 | End: 2024-03-16 | Stop reason: HOSPADM

## 2024-03-16 RX ORDER — OXYCODONE HCL 5 MG/5 ML
5 SOLUTION, ORAL ORAL
Status: DISCONTINUED | OUTPATIENT
Start: 2024-03-16 | End: 2024-03-16 | Stop reason: HOSPADM

## 2024-03-16 RX ORDER — ONDANSETRON 2 MG/ML
4 INJECTION INTRAMUSCULAR; INTRAVENOUS EVERY 6 HOURS PRN
Status: DISCONTINUED | OUTPATIENT
Start: 2024-03-17 | End: 2024-03-17 | Stop reason: HOSPADM

## 2024-03-16 RX ORDER — MISOPROSTOL 200 UG/1
800 TABLET ORAL ONCE
Status: COMPLETED | OUTPATIENT
Start: 2024-03-16 | End: 2024-03-16

## 2024-03-16 RX ORDER — OXYCODONE HCL 5 MG/5 ML
10 SOLUTION, ORAL ORAL
Status: DISCONTINUED | OUTPATIENT
Start: 2024-03-16 | End: 2024-03-16 | Stop reason: HOSPADM

## 2024-03-16 RX ORDER — DIPHENHYDRAMINE HYDROCHLORIDE 50 MG/ML
25 INJECTION INTRAMUSCULAR; INTRAVENOUS EVERY 6 HOURS PRN
Status: DISCONTINUED | OUTPATIENT
Start: 2024-03-17 | End: 2024-03-17 | Stop reason: HOSPADM

## 2024-03-16 RX ORDER — FOLIC ACID 0.8 MG
500 TABLET ORAL 3 TIMES DAILY
Status: DISCONTINUED | OUTPATIENT
Start: 2024-03-16 | End: 2024-03-17 | Stop reason: HOSPADM

## 2024-03-16 RX ORDER — SODIUM CHLORIDE, SODIUM LACTATE, POTASSIUM CHLORIDE, CALCIUM CHLORIDE 600; 310; 30; 20 MG/100ML; MG/100ML; MG/100ML; MG/100ML
INJECTION, SOLUTION INTRAVENOUS ONCE
Status: ACTIVE | OUTPATIENT
Start: 2024-03-16 | End: 2024-03-17

## 2024-03-16 RX ORDER — ACETAMINOPHEN 500 MG
1000 TABLET ORAL EVERY 6 HOURS PRN
Status: DISCONTINUED | OUTPATIENT
Start: 2024-03-20 | End: 2024-03-17 | Stop reason: HOSPADM

## 2024-03-16 RX ORDER — ONDANSETRON 2 MG/ML
4 INJECTION INTRAMUSCULAR; INTRAVENOUS EVERY 6 HOURS PRN
Status: ACTIVE | OUTPATIENT
Start: 2024-03-16 | End: 2024-03-17

## 2024-03-16 RX ORDER — KETOROLAC TROMETHAMINE 15 MG/ML
15 INJECTION, SOLUTION INTRAMUSCULAR; INTRAVENOUS EVERY 6 HOURS
Status: DISCONTINUED | OUTPATIENT
Start: 2024-03-16 | End: 2024-03-17 | Stop reason: HOSPADM

## 2024-03-16 RX ORDER — BUPIVACAINE HYDROCHLORIDE 7.5 MG/ML
INJECTION, SOLUTION INTRASPINAL
Status: COMPLETED | OUTPATIENT
Start: 2024-03-16 | End: 2024-03-16

## 2024-03-16 RX ORDER — MORPHINE SULFATE 0.5 MG/ML
INJECTION, SOLUTION EPIDURAL; INTRATHECAL; INTRAVENOUS
Status: COMPLETED | OUTPATIENT
Start: 2024-03-16 | End: 2024-03-16

## 2024-03-16 RX ORDER — DIPHENHYDRAMINE HCL 25 MG
25 TABLET ORAL EVERY 6 HOURS PRN
Status: DISCONTINUED | OUTPATIENT
Start: 2024-03-17 | End: 2024-03-17 | Stop reason: HOSPADM

## 2024-03-16 RX ORDER — HYDROMORPHONE HYDROCHLORIDE 1 MG/ML
0.2 INJECTION, SOLUTION INTRAMUSCULAR; INTRAVENOUS; SUBCUTANEOUS
Status: DISCONTINUED | OUTPATIENT
Start: 2024-03-16 | End: 2024-03-16 | Stop reason: HOSPADM

## 2024-03-16 RX ORDER — ONDANSETRON 4 MG/1
4 TABLET, ORALLY DISINTEGRATING ORAL EVERY 6 HOURS PRN
Status: DISCONTINUED | OUTPATIENT
Start: 2024-03-17 | End: 2024-03-17 | Stop reason: HOSPADM

## 2024-03-16 RX ORDER — SCOLOPAMINE TRANSDERMAL SYSTEM 1 MG/1
PATCH, EXTENDED RELEASE TRANSDERMAL PRN
Status: DISCONTINUED | OUTPATIENT
Start: 2024-03-16 | End: 2024-03-16 | Stop reason: SURG

## 2024-03-16 RX ORDER — KETOROLAC TROMETHAMINE 15 MG/ML
INJECTION, SOLUTION INTRAMUSCULAR; INTRAVENOUS PRN
Status: DISCONTINUED | OUTPATIENT
Start: 2024-03-16 | End: 2024-03-16 | Stop reason: SURG

## 2024-03-16 RX ORDER — OXYTOCIN 10 [USP'U]/ML
INJECTION, SOLUTION INTRAMUSCULAR; INTRAVENOUS PRN
Status: DISCONTINUED | OUTPATIENT
Start: 2024-03-16 | End: 2024-03-16 | Stop reason: SURG

## 2024-03-16 RX ORDER — ACETAMINOPHEN 500 MG
1000 TABLET ORAL EVERY 6 HOURS
Status: DISPENSED | OUTPATIENT
Start: 2024-03-16 | End: 2024-03-17

## 2024-03-16 RX ORDER — VITAMIN A ACETATE, BETA CAROTENE, ASCORBIC ACID, CHOLECALCIFEROL, .ALPHA.-TOCOPHEROL ACETATE, DL-, THIAMINE MONONITRATE, RIBOFLAVIN, NIACINAMIDE, PYRIDOXINE HYDROCHLORIDE, FOLIC ACID, CYANOCOBALAMIN, CALCIUM CARBONATE, FERROUS FUMARATE, ZINC OXIDE, CUPRIC OXIDE 3080; 12; 120; 400; 1; 1.84; 3; 20; 22; 920; 25; 200; 27; 10; 2 [IU]/1; UG/1; MG/1; [IU]/1; MG/1; MG/1; MG/1; MG/1; MG/1; [IU]/1; MG/1; MG/1; MG/1; MG/1; MG/1
1 TABLET, FILM COATED ORAL
Status: DISCONTINUED | OUTPATIENT
Start: 2024-03-17 | End: 2024-03-17 | Stop reason: HOSPADM

## 2024-03-16 RX ORDER — SODIUM CHLORIDE, SODIUM GLUCONATE, SODIUM ACETATE, POTASSIUM CHLORIDE AND MAGNESIUM CHLORIDE 526; 502; 368; 37; 30 MG/100ML; MG/100ML; MG/100ML; MG/100ML; MG/100ML
INJECTION, SOLUTION INTRAVENOUS
Status: DISCONTINUED | OUTPATIENT
Start: 2024-03-16 | End: 2024-03-16 | Stop reason: SURG

## 2024-03-16 RX ORDER — CITRIC ACID/SODIUM CITRATE 334-500MG
30 SOLUTION, ORAL ORAL ONCE
Status: COMPLETED | OUTPATIENT
Start: 2024-03-16 | End: 2024-03-16

## 2024-03-16 RX ORDER — SIMETHICONE 125 MG
125 TABLET,CHEWABLE ORAL 4 TIMES DAILY PRN
Status: DISCONTINUED | OUTPATIENT
Start: 2024-03-16 | End: 2024-03-17 | Stop reason: HOSPADM

## 2024-03-16 RX ORDER — OXYCODONE HYDROCHLORIDE 5 MG/1
5 TABLET ORAL EVERY 4 HOURS PRN
Status: DISCONTINUED | OUTPATIENT
Start: 2024-03-17 | End: 2024-03-17 | Stop reason: HOSPADM

## 2024-03-16 RX ORDER — HALOPERIDOL 5 MG/ML
1 INJECTION INTRAMUSCULAR
Status: DISCONTINUED | OUTPATIENT
Start: 2024-03-16 | End: 2024-03-16 | Stop reason: HOSPADM

## 2024-03-16 RX ORDER — SODIUM CHLORIDE, SODIUM GLUCONATE, SODIUM ACETATE, POTASSIUM CHLORIDE AND MAGNESIUM CHLORIDE 526; 502; 368; 37; 30 MG/100ML; MG/100ML; MG/100ML; MG/100ML; MG/100ML
1500 INJECTION, SOLUTION INTRAVENOUS ONCE
Status: COMPLETED | OUTPATIENT
Start: 2024-03-16 | End: 2024-03-16

## 2024-03-16 RX ADMIN — CARBOPROST TROMETHAMINE 250 MCG: 250 INJECTION, SOLUTION INTRAMUSCULAR at 15:45

## 2024-03-16 RX ADMIN — SODIUM CHLORIDE, SODIUM GLUCONATE, SODIUM ACETATE, POTASSIUM CHLORIDE AND MAGNESIUM CHLORIDE 1500 ML: 526; 502; 368; 37; 30 INJECTION, SOLUTION INTRAVENOUS at 09:15

## 2024-03-16 RX ADMIN — METHYLERGONOVINE MALEATE 0.2 MG: 0.2 INJECTION, SOLUTION INTRAMUSCULAR; INTRAVENOUS at 14:38

## 2024-03-16 RX ADMIN — KETOROLAC TROMETHAMINE 15 MG: 15 INJECTION, SOLUTION INTRAMUSCULAR; INTRAVENOUS at 11:41

## 2024-03-16 RX ADMIN — METOCLOPRAMIDE 10 MG: 5 INJECTION, SOLUTION INTRAMUSCULAR; INTRAVENOUS at 09:40

## 2024-03-16 RX ADMIN — ONDANSETRON 4 MG: 2 INJECTION INTRAMUSCULAR; INTRAVENOUS at 10:23

## 2024-03-16 RX ADMIN — OXYTOCIN 125 ML/HR: 10 INJECTION, SOLUTION INTRAMUSCULAR; INTRAVENOUS at 13:06

## 2024-03-16 RX ADMIN — MISOPROSTOL 800 MCG: 200 TABLET ORAL at 11:43

## 2024-03-16 RX ADMIN — BUPIVACAINE HYDROCHLORIDE IN DEXTROSE 1.5 ML: 7.5 INJECTION, SOLUTION SUBARACHNOID at 10:27

## 2024-03-16 RX ADMIN — DEXAMETHASONE SODIUM PHOSPHATE 8 MG: 4 INJECTION INTRA-ARTICULAR; INTRALESIONAL; INTRAMUSCULAR; INTRAVENOUS; SOFT TISSUE at 10:29

## 2024-03-16 RX ADMIN — FAMOTIDINE 20 MG: 10 INJECTION, SOLUTION INTRAVENOUS at 09:40

## 2024-03-16 RX ADMIN — ACETAMINOPHEN 1000 MG: 500 TABLET, FILM COATED ORAL at 13:52

## 2024-03-16 RX ADMIN — OXYTOCIN 20 UNITS: 10 INJECTION, SOLUTION INTRAMUSCULAR; INTRAVENOUS at 11:00

## 2024-03-16 RX ADMIN — SODIUM CHLORIDE, POTASSIUM CHLORIDE, SODIUM LACTATE AND CALCIUM CHLORIDE 1000 ML: 600; 310; 30; 20 INJECTION, SOLUTION INTRAVENOUS at 16:20

## 2024-03-16 RX ADMIN — SIMETHICONE 125 MG: 125 TABLET, CHEWABLE ORAL at 21:47

## 2024-03-16 RX ADMIN — SCOPOLAMINE 1 PATCH: 1.5 PATCH, EXTENDED RELEASE TRANSDERMAL at 11:15

## 2024-03-16 RX ADMIN — ACETAMINOPHEN 1000 MG: 500 TABLET, FILM COATED ORAL at 21:04

## 2024-03-16 RX ADMIN — TRANEXAMIC ACID 1000 MG: 100 INJECTION, SOLUTION INTRAVENOUS at 15:59

## 2024-03-16 RX ADMIN — MORPHINE SULFATE 150 MCG: 0.5 INJECTION, SOLUTION EPIDURAL; INTRATHECAL; INTRAVENOUS at 10:27

## 2024-03-16 RX ADMIN — FENTANYL CITRATE 15 MCG: 50 INJECTION, SOLUTION INTRAMUSCULAR; INTRAVENOUS at 10:27

## 2024-03-16 RX ADMIN — PHENYLEPHRINE HYDROCHLORIDE 0.5 MCG/KG/MIN: 10 INJECTION INTRAVENOUS at 10:27

## 2024-03-16 RX ADMIN — OXYTOCIN 125 ML/HR: 10 INJECTION, SOLUTION INTRAMUSCULAR; INTRAVENOUS at 11:53

## 2024-03-16 RX ADMIN — CEFAZOLIN 2 G: 1 INJECTION, POWDER, FOR SOLUTION INTRAMUSCULAR; INTRAVENOUS at 10:29

## 2024-03-16 RX ADMIN — SODIUM CHLORIDE, SODIUM GLUCONATE, SODIUM ACETATE, POTASSIUM CHLORIDE AND MAGNESIUM CHLORIDE: 526; 502; 368; 37; 30 INJECTION, SOLUTION INTRAVENOUS at 10:23

## 2024-03-16 RX ADMIN — KETOROLAC TROMETHAMINE 15 MG: 15 INJECTION, SOLUTION INTRAMUSCULAR; INTRAVENOUS at 17:57

## 2024-03-16 RX ADMIN — SODIUM CITRATE AND CITRIC ACID MONOHYDRATE 30 ML: 334; 500 SOLUTION ORAL at 10:15

## 2024-03-16 ASSESSMENT — LIFESTYLE VARIABLES
HAVE PEOPLE ANNOYED YOU BY CRITICIZING YOUR DRINKING: NO
CONSUMPTION TOTAL: NEGATIVE
TOTAL SCORE: 0
TOTAL SCORE: 0
HOW MANY TIMES IN THE PAST YEAR HAVE YOU HAD 5 OR MORE DRINKS IN A DAY: 0
EVER FELT BAD OR GUILTY ABOUT YOUR DRINKING: NO
AVERAGE NUMBER OF DAYS PER WEEK YOU HAVE A DRINK CONTAINING ALCOHOL: 0
TOTAL SCORE: 0
EVER_SMOKED: NEVER
ON A TYPICAL DAY WHEN YOU DRINK ALCOHOL HOW MANY DRINKS DO YOU HAVE: 0
ALCOHOL_USE: NO
EVER HAD A DRINK FIRST THING IN THE MORNING TO STEADY YOUR NERVES TO GET RID OF A HANGOVER: NO
HAVE YOU EVER FELT YOU SHOULD CUT DOWN ON YOUR DRINKING: NO

## 2024-03-16 ASSESSMENT — PATIENT HEALTH QUESTIONNAIRE - PHQ9
SUM OF ALL RESPONSES TO PHQ9 QUESTIONS 1 AND 2: 0
1. LITTLE INTEREST OR PLEASURE IN DOING THINGS: NOT AT ALL
2. FEELING DOWN, DEPRESSED, IRRITABLE, OR HOPELESS: NOT AT ALL

## 2024-03-16 ASSESSMENT — PAIN DESCRIPTION - PAIN TYPE
TYPE: ACUTE PAIN
TYPE: ACUTE PAIN;SURGICAL PAIN

## 2024-03-16 ASSESSMENT — PAIN SCALES - GENERAL
PAIN_LEVEL: 0
PAINLEVEL: 0 - NO PAIN

## 2024-03-16 ASSESSMENT — FIBROSIS 4 INDEX: FIB4 SCORE: 0.71

## 2024-03-16 NOTE — PROGRESS NOTES
Post Partum Progress Note    Name:   Brunilda Webber   Date/Time:  3/16/2024 - 4:25 PM  Chief Admitting Dx:  Labor and delivery, indication for care [O75.9]    Subjective:  Called to bedside as pt is gushing blood although reports fundus still firm. She did have 227g weighed ebl since delivery so I had ordered methergine but was called shortly after for her bleeding was still continuing.   She and baby are otherwise doing well - she had no complaints and vitals were stable.     Urineoutpt was borderline though 30cc for last 2 hrs.   Vitals:    03/16/24 1322 03/16/24 1400 03/16/24 1528 03/16/24 1600   BP: 104/68 109/64 122/68    Pulse: 89 66 70 67   Resp: 16 16 18 18   Temp: 37.1 °C (98.8 °F) 37.2 °C (99 °F)     TempSrc: Temporal Temporal     SpO2: 95% 97% 96% 94%   Weight:       Height:           Exam:  Gen: no acute distress but spinal has almost completely worn off now  SVE: Bimanual uterine evacuation - clots and blood felt all in lower uterine segment evacuated and pt tolerated it better than I expected but of course it was painful for her.   Uterus and lower uterine segment was firm when I removed the clots    Weighed total with the above included : 790cc thus far   UOP 30cc in 1-2 hrs.     TXA and and Hemabate given now    Labs:   Recent Labs     03/16/24  0815 03/16/24  1550   WBC 9.2 18.8*   RBC 4.56 4.32   HEMOGLOBIN 13.9 13.3   HEMATOCRIT 39.3 37.6   MCV 86.2 87.0   MCH 30.5 30.8   MCHC 35.4 35.4   RDW 44.6 44.3   PLATELETCT 165 181   MPV 9.8 9.5       Assessment:  PPH due to atony of lower segment    Plan:  Sp cytotec in or and 2 bags of pitocin in .   After methergine, txa, and hemabate given and manual extraction of clots  Getting 1L iv fluid bolus  Stat cbc ordered  (see result above)  I was informed that we have the Bridget device and even though I've been in serviced and she'd be the perfect candidate for it that I cannot use it as the staff has not been in serviced which is super frustrating.   Advised  pt if she continues to fill lower uterine segment up again I'd place a bakri balloon.     Micki Pepe M.D.

## 2024-03-16 NOTE — OR SURGEON
Immediate Post OP Note    PreOp Diagnosis: IUP at 39 weeks, IVF pregnancy, Prior history of multiple abdominal surgeries including Bilateral salpingectomy      PostOp Diagnosis: same      Procedure(s):  REPEAT  SECTION via pfannenstiel skin inscision with uterine curetting    Wound Class: Clean Contaminated    Surgeon: Micki Pepe MD  Asst: Adolph Kim MD    Anesthesiologist/Type of Anesthesia:  Anesthesiologist: Aston Donnelly M.D./Spinal    Surgical Staff:  Circulator: Emma Fish R.N.  Scrub Person: Noreen RABAGO&KARINA Baby  Nurse: Brittany Luo R.N.    Specimens removed if any:  none    Estimated Blood Loss: 700cc    Findings: Lots of intraabdominal adhesive disease;   Fetus: female, vtx, clear fluid, very thin lower uterine segment, Bilaterally enlarged ovaries, no fallopian tubes present. Nuchal cord x 1. Apgars 8/8, weight 7lbs 11oz.   Placenta stuck to posterior wall- uterine curettage   Slight atony post procedure: 800mcg cytotec placed rectally.     Complications: none    Dispo: Pt and baby to RR stable        3/16/2024 11:56 AM Micki Pepe M.D.

## 2024-03-16 NOTE — ANESTHESIA PREPROCEDURE EVALUATION
Case: [id number] Date/Time: 2415    Procedure: REPEAT  SECTION    Pre-op diagnosis: HISTORY OF  SECTION, 39 WEEKS    Location: [facility] OR 01 / SURGERY LABOR [facility] DELIVERY    Surgeons: Micki Pepe M.D.          Patient presents for      Anesthesia: No problems with Anesthesia. +hx of scoliosis.   Resp: No asthma or COPD history.  Cards: No pre-eclampsia, or known cardiac abnormalities.  Heme: No known bleeding disorders, platelets reviewed.    Risks of procedure discussed including: infection, bleeding, nerve damage, and post-dural puncture headache.     Relevant Problems   No relevant active problems       Physical Exam    Airway   Mallampati: II  TM distance: >3 FB  Neck ROM: full       Cardiovascular - normal exam  Rhythm: regular  Rate: normal  (-) murmur     Dental - normal exam           Pulmonary - normal exam  Breath sounds clear to auscultation     Abdominal    Neurological - normal exam                   Anesthesia Plan    ASA 2       Plan - spinal   Neuraxial block will be primary anesthetic                Postoperative Plan: Postoperative administration of opioids is intended.    Pertinent diagnostic labs and testing reviewed    Informed Consent:    Anesthetic plan and risks discussed with patient.

## 2024-03-16 NOTE — ANESTHESIA POSTPROCEDURE EVALUATION
Patient: Brunilda Webber    Procedure Summary       Date: 24 Room / Location: [facility] OR 01 / SURGERY LABOR AND DELIVERY    Anesthesia Start: 1023 Anesthesia Stop: 1149    Procedure: REPEAT  SECTION (Abdomen) Diagnosis: (SAME, DEL)    Surgeons: Micki Pepe M.D. Responsible Provider: Aston Donnelly M.D.    Anesthesia Type: spinal ASA Status: 2            Final Anesthesia Type: spinal  Last vitals  BP   Blood Pressure: 95/54    Temp   36.7 °C (98.1 °F)    Pulse   75   Resp   16    SpO2   94 %      Anesthesia Post Evaluation    Patient location during evaluation: PACU  Patient participation: complete - patient participated  Level of consciousness: awake and alert  Pain score: 0    Airway patency: patent  Anesthetic complications: no  Cardiovascular status: hemodynamically stable  Respiratory status: acceptable  Hydration status: euvolemic    PONV: none          There were no known notable events for this encounter.     Nurse Pain Score: 0 (NPRS)

## 2024-03-16 NOTE — H&P
DATE OF ADMISSION:  2024     HISTORY OF PRESENT ILLNESS:  The patient is a 33-year-old , who presents   to labor and delivery at 39 weeks' gestation, EDC of 2024 for scheduled   repeat  section.  She is IVF pregnancy and has had a very   uncomplicated prenatal course.  She has a tested embryo.  She had a normal   1-hour Glucola.  She had a normal fetal survey.  Her group B strep is   negative.  She desires repeat  at this time.     PAST MEDICAL HISTORY:  Only significant for heterotopic IVF pregnancy.    Infertility and history of abnormal Pap smears and HPV high risk positive.     PAST SURGICAL HISTORY:  Includes appendectomy w/ right salpingectomy.Separate salpingectomy when she had an ectopic a heterotopic pregnancy and had retained POC & needed hysteroscopy and d&c for that and  section. Additionally she reports that after her last c/section she had a mini abdominoplasty as well.      ALLERGIES:  None.     CURRENT MEDICATIONS:  Include only prenatal vitamins      SOCIAL HISTORY:  No alcohol, tobacco or history of drug use.  She is .    Her 's name is Juan.     OBSTETRIC HISTORY:  .  She has had 1 primary  section at term for   breech.  That baby had a congenital diaphragmatic hernia.     GYNECOLOGIC HISTORY:  Last menstrual period 2023.  Pap smears, she had   ASCUS Pap with possible high-grade lesion, HPV is high risk positive but she   declined colposcopy during pregnancy.     FAMILY HISTORY:  Noncontributory to current problem.     PHYSICAL EXAMINATION:  VITAL SIGNS:  In the office earlier this week, blood pressure was 122/80,   weight 206 pounds.  GENERAL:  Awake, alert, oriented, no acute distress.  CARDIOVASCULAR:  Regular rate and rhythm.  CHEST:  Clear to auscultation bilaterally.  ABDOMEN:  Gravid, nontender, nondistended, normal bowel sounds.  EXTREMITIES:  No cyanosis, clubbing or edema.  PELVIC:  Sterile vaginal exam was  deferred.      Fetal heart rate tracing:   Category 1, reactive, moderate variability, no decelerations, frequent contractions.     LABORATORY DATA:  She is O positive.  Rubella is immune, RPR nonreactive,   hepatitis B surface antigen negative, hepatitis C antigen negative.  HIV   nonreactive.  Varicella is immune and her one-hour Glucola was normal at 97   and again GBS is negative.     ASSESSMENT AND PLAN:  A 33-year-old  at 39 weeks' gestation.  1.  High risk IVF pregnancy at 39 weeks gestation.  2.  Previous  section, desiring repeat.  Risks, benefits and   alternatives have been thoroughly discussed with her by her primary OB.  Risks   including bleeding, infection, pain, injury to bowel, bladder, uterus,   fallopian tubes, ovaries, major blood vessels or nerves.  She does accept the   use of blood products in event of hemorrhage.  All questions were answered to   her satisfaction prior to signing consent forms.        ______________________________  MD JANEE BORJA/LORE    DD:  2024 09:32  DT:  2024 09:54    Job#:  [id number]

## 2024-03-16 NOTE — ANESTHESIA TIME REPORT
Anesthesia Start and Stop Event Times       Date Time Event    3/16/2024 0939 Ready for Procedure     1023 Anesthesia Start     1149 Anesthesia Stop          Responsible Staff  03/16/24      Name Role Begin End    Aston Donnelly M.D. Anesth 1023 1149          Overtime Reason:  no overtime (within assigned shift)    Comments:                                                           (4) walks frequently

## 2024-03-16 NOTE — PROGRESS NOTES
1353-Pt called c/o gushing blood. On assessment, fundus is firm at umbilicus, with small blood clots. Pt denied dizziness. Pitocin rate changed to bolus. Pad yyfoeq=292 g. Dr. Pepe was notified. Received an order of Methergine 0.2 mg IM ONCE.     1515-Gushes of blood noted with fundal massage, fundus firm at umbilicus. Pad weight=153 g. HG=170/68.    1530-Notified Dr. Pepe. MD will assess pt.    1555-MD at bedside and performed manual extraction of blood clots. Total pad weight 410 g. Hemabate and TXA given per MD order. Received a verbal order from MD to give 1 L of LR bolus.    1723-Per Dr. Pepe, draw repeat CBC at 2200.

## 2024-03-16 NOTE — OP REPORT
DATE OF SERVICE:  2024     PREOPERATIVE DIAGNOSES:  1.  Intrauterine pregnancy at 39 weeks' gestation.  2.  IVF pregnancy.  3.  Prior history of multiple intra-abdominal surgeries including bilateral   salpingectomy.     POSTOPERATIVE DIAGNOSES:  1.  Intrauterine pregnancy at 39 weeks' gestation.  2.  IVF pregnancy.  3.  Prior history of multiple intra-abdominal surgeries including bilateral   salpingectomy.     PROCEDURE:  Repeat low transverse  section via Pfannenstiel skin   incision with uterine curettings.     SURGEON:  Micki Pepe MD     ASSISTANT:  Adolph Kim MD     ANESTHESIOLOGIST:  Aston Donnelly MD     ANESTHESIA:  Spinal.     SPECIMENS:  None.     ESTIMATED BLOOD LOSS:  700 mL.     FINDINGS:  She had lots of intra-abdominal adhesive disease, especially of the   fascia and rectus muscle area.  Fetus female infant, vertex, clear amniotic   fluid.  She had a very thin lower uterine segment, bilaterally enlarged   ovaries appeared polycystic ovarian like.  No fallopian tubes were present.    Nuchal cord x1, Apgars 8 and 8, weight 7 pounds 11 ounces.  Placenta was quite   stuck to the posterior uterine wall.  Uterine curetting was performed after   placenta and membranes were removed.  Slight atony postprocedure, 800 mcg of   Cytotec was placed rectally.     COMPLICATIONS:  Otherwise none.     DISPOSITION:  The patient and baby both went to recovery room stable.  Counts   sponge, laps and needle counts were correct.     DESCRIPTION OF PROCEDURE:  The patient was taken to the operating room where   Dr. Donnelly placed her under spinal anesthesia without any difficulty.  She was   prepped and draped in a normal sterile fashion in dorsal supine position with   leftward tilt.  Fetal heart tones were confirmed and reassuring prior to the   prep.  Timeout was called to identify correct patient, correct procedure and   confirm she got preoperative antibiotics.  A Pfannenstiel skin incision was   made  through her prior one and carried down to the underlying layer of fascia   with a knife.  The fascia was incised in the midline and the incision was   carried out laterally with Pizarro scissors.  She had quite a bit of adhesive   disease of the fascia to the rectus muscles and this was carefully dissected   out.  The anterior fascia was grasped with Kochers, tented up and undermined   with Bovie and Pizarro scissors.  The lower edge of the fascia was grasped with   Kocher clamps, tented up and undermined.  The rectus muscles were    midline.  The peritoneum was identified and entered and the incision was   extended.  The Beltran O retractor was then placed.  The lower uterine segment   was identified and found to be extremely thin.  Transverse incision was made   on the lower uterine segment, again very thin and there was clear amniotic   fluid.  The incision was extended with fingers and the fetus was delivered in   cephalic presentation without any complications or difficulty.  There was a   nuchal cord x1 that was reduced.  Nose and mouth were bulb suctioned.  Cord   was clamped x2 and cut and the baby was handed off to waiting respiratory   therapist and delivery team nurse.  Manual extraction of placenta intact,   although it was quite adherent to the posterior uterine wall.  Once it was   carefully removed and all membranes appeared gone, the uterine curettings   along the posterior wall where the placenta was adherent was performed.  The   uterus was closed with 0 chromic in a running locked fashion.  Good hemostasis   was achieved.  Bilateral ovaries were visualized.  They were both enlarged   and appeared polycystic ovarian like.  No fallopian tubes present.  The   hysterotomy was again confirmed hemostatic and the Beltran O retractor was   taken out.  Fascia was closed with 0 Vicryl from either side and tied in the   middle.  The incision was irrigated.  The subcutaneous layer was closed with   3-0  Vicryl and the skin was closed with Monocryl.  Sponge, laps and needle   counts were correct.  Mother and baby both doing well.  Mother to go to   postpartum, baby to  nursery.  While transferring her to the [facility]   from the OR, she did have some uterine atony and 800 mcg of Cytotec was placed   rectally.        ______________________________  MD JANEE BORJA/LORE    DD:  2024 12:10  DT:  2024 12:38    Job#:  [id number]

## 2024-03-16 NOTE — PROGRESS NOTES
Pt arrived to room 332 from L&D via gurney transport. Transferred to bed with 2-person assist. Bedside report received from GERALDO Carter. Lochia scant and rubra, without clot expressed; fundus firm and midline. Last bag of Pitocin is infusing at 125 mL/hr. Pt and SO oriented to room, call light, pain management options, paperwork, santiago care, and pad changes. Updated on POC and safety precautions. Upper side rails up x2, bed on lowest position, call light within reach. Questions answered and they verbalized understanding.

## 2024-03-16 NOTE — PROGRESS NOTES
0800:  39 weeks (EDC 3/23/24) arrives to L&D for scheduled repeat C/S. Pt endorses +FM, denies UC, VB/LOF. VSS. Monitors applied x2.  0838: Dr. Pepe updated on pt's arrival.  1021: Pt in OR.  1026: Time out performed prior to spinal, all agree to proceed.  1045: Time out performed prior to surgery; all agree to proceed.   1058: C/S delivery of viable female infant. APGARS 8/8. EBL: 700 cc.  1147: Pt arrives to PACU in stable condition.   1250: Pt and infant transported to postpartum in stable condition. Pt has all personal belongings.   1305: Report given to GERALDO Jarrett. All questions and concerns addressed at this time. Care relinquished.

## 2024-03-16 NOTE — ANESTHESIA PROCEDURE NOTES
Spinal Block    Date/Time: 3/16/2024 10:27 AM    Performed by: Aston Donnelly M.D.  Authorized by: Aston Donnelly M.D.    Patient Location:  OR  Start Time:  3/16/2024 10:27 AM  Reason for Block: primary anesthetic    patient identified, IV checked, site marked, risks and benefits discussed, surgical consent, monitors and equipment checked, pre-op evaluation and timeout performed    Patient Position:  Sitting  Prep: ChloraPrep, patient draped and sterile technique    Monitoring:  Blood pressure, continuous pulse oximetry and heart rate  Approach:  Midline  Location:  L3-4  Injection Technique:  Single-shot  Skin infiltration:  Lidocaine  Strength:  1%  Dose:  3ml  Needle Type:  Pencan  Needle Gauge:  25 G  CSF flowing pre/post injection:  Yes  Sensory Level:  T4

## 2024-03-17 ENCOUNTER — PHARMACY VISIT (OUTPATIENT)
Dept: PHARMACY | Facility: MEDICAL CENTER | Age: 34
End: 2024-03-17
Payer: COMMERCIAL

## 2024-03-17 VITALS
HEIGHT: 67 IN | TEMPERATURE: 98.4 F | OXYGEN SATURATION: 99 % | RESPIRATION RATE: 16 BRPM | WEIGHT: 201 LBS | DIASTOLIC BLOOD PRESSURE: 50 MMHG | HEART RATE: 65 BPM | BODY MASS INDEX: 31.55 KG/M2 | SYSTOLIC BLOOD PRESSURE: 93 MMHG

## 2024-03-17 PROCEDURE — 700102 HCHG RX REV CODE 250 W/ 637 OVERRIDE(OP): Performed by: INTERNAL MEDICINE

## 2024-03-17 PROCEDURE — 700102 HCHG RX REV CODE 250 W/ 637 OVERRIDE(OP): Performed by: OBSTETRICS & GYNECOLOGY

## 2024-03-17 PROCEDURE — 700111 HCHG RX REV CODE 636 W/ 250 OVERRIDE (IP): Performed by: INTERNAL MEDICINE

## 2024-03-17 PROCEDURE — A9270 NON-COVERED ITEM OR SERVICE: HCPCS | Performed by: OBSTETRICS & GYNECOLOGY

## 2024-03-17 PROCEDURE — A9270 NON-COVERED ITEM OR SERVICE: HCPCS | Performed by: INTERNAL MEDICINE

## 2024-03-17 PROCEDURE — RXMED WILLOW AMBULATORY MEDICATION CHARGE: Performed by: OBSTETRICS & GYNECOLOGY

## 2024-03-17 RX ORDER — PSEUDOEPHEDRINE HCL 30 MG
100 TABLET ORAL 2 TIMES DAILY PRN
Qty: 60 CAPSULE | Refills: 0 | Status: SHIPPED | OUTPATIENT
Start: 2024-03-17

## 2024-03-17 RX ORDER — ACETAMINOPHEN 500 MG
1000 TABLET ORAL 3 TIMES DAILY
Qty: 30 TABLET | Refills: 0 | Status: SHIPPED | OUTPATIENT
Start: 2024-03-17

## 2024-03-17 RX ORDER — IBUPROFEN 800 MG/1
TABLET ORAL
Qty: 30 TABLET | Refills: 1 | Status: SHIPPED | OUTPATIENT
Start: 2024-03-17 | End: 2024-04-07

## 2024-03-17 RX ORDER — ACETAMINOPHEN 500 MG
TABLET ORAL
COMMUNITY
Start: 2024-03-17 | End: 2024-03-27

## 2024-03-17 RX ORDER — OXYCODONE HYDROCHLORIDE 5 MG/1
5 TABLET ORAL EVERY 4 HOURS PRN
Qty: 20 TABLET | Refills: 0 | Status: SHIPPED | OUTPATIENT
Start: 2024-03-17 | End: 2024-03-24

## 2024-03-17 RX ADMIN — ACETAMINOPHEN 1000 MG: 500 TABLET, FILM COATED ORAL at 09:04

## 2024-03-17 RX ADMIN — PRENATAL WITH FERROUS FUM AND FOLIC ACID 1 TABLET: 3080; 920; 120; 400; 22; 1.84; 3; 20; 10; 1; 12; 200; 27; 25; 2 TABLET ORAL at 09:04

## 2024-03-17 RX ADMIN — KETOROLAC TROMETHAMINE 15 MG: 15 INJECTION, SOLUTION INTRAMUSCULAR; INTRAVENOUS at 00:57

## 2024-03-17 RX ADMIN — Medication 500 MG: at 09:00

## 2024-03-17 RX ADMIN — KETOROLAC TROMETHAMINE 15 MG: 15 INJECTION, SOLUTION INTRAMUSCULAR; INTRAVENOUS at 07:12

## 2024-03-17 ASSESSMENT — PAIN DESCRIPTION - PAIN TYPE
TYPE: ACUTE PAIN;SURGICAL PAIN
TYPE: ACUTE PAIN;SURGICAL PAIN
TYPE: ACUTE PAIN

## 2024-03-17 ASSESSMENT — EDINBURGH POSTNATAL DEPRESSION SCALE (EPDS)
I HAVE BEEN ANXIOUS OR WORRIED FOR NO GOOD REASON: HARDLY EVER
I HAVE BEEN SO UNHAPPY THAT I HAVE HAD DIFFICULTY SLEEPING: NOT AT ALL
THINGS HAVE BEEN GETTING ON TOP OF ME: NO, I HAVE BEEN COPING AS WELL AS EVER
I HAVE BEEN SO UNHAPPY THAT I HAVE BEEN CRYING: NO, NEVER
I HAVE FELT SCARED OR PANICKY FOR NO GOOD REASON: NO, NOT AT ALL
I HAVE BLAMED MYSELF UNNECESSARILY WHEN THINGS WENT WRONG: NOT VERY OFTEN
THE THOUGHT OF HARMING MYSELF HAS OCCURRED TO ME: NEVER
I HAVE LOOKED FORWARD WITH ENJOYMENT TO THINGS: AS MUCH AS I EVER DID
I HAVE FELT SAD OR MISERABLE: NO, NOT AT ALL
I HAVE BEEN ABLE TO LAUGH AND SEE THE FUNNY SIDE OF THINGS: AS MUCH AS I ALWAYS COULD

## 2024-03-17 NOTE — CARE PLAN
The patient is Stable - Low risk of patient condition declining or worsening    Shift Goals  Clinical Goals: Lochia WDL  Patient Goals: pain control  Family Goals: support    Progress made toward(s) clinical / shift goals:    Problem: Knowledge Deficit - L&D  Goal: Patient and family/caregivers will demonstrate understanding of plan of care, disease process/condition, diagnostic tests and medications  Outcome: Progressing     Problem: Risk for Excess Fluid Volume  Goal: Patient will demonstrate pulse, blood pressure and neurologic signs within expected ranges and without any respiratory complications  Outcome: Progressing     Problem: Skin Integrity  Goal: Skin integrity is maintained or improved  Outcome: Progressing     Problem: Psychosocial - L&D  Goal: Patient's level of anxiety will decrease  Outcome: Progressing     Problem: Pain  Goal: Patient's pain will be alleviated or reduced to the patient’s comfort goal  Outcome: Progressing     Problem: Risk for Infection and Impaired Wound Healing  Goal: Patient will remain free from infection  Outcome: Progressing     Problem: Knowledge Deficit - Standard  Goal: Patient and family/care givers will demonstrate understanding of plan of care, disease process/condition, diagnostic tests and medications  Outcome: Progressing     Problem: Pain - Standard  Goal: Alleviation of pain or a reduction in pain to the patient’s comfort goal  Outcome: Progressing       Patient is not progressing towards the following goals:

## 2024-03-17 NOTE — PROGRESS NOTES
Progress Note    Brunildadanyell Diamondd   Post-op day 1  Chief Admitting Dx: Labor and delivery, indication for care [O75.9]  Delivery Type:  for repeat      Subjective:  Ambulating: voiding, tolerating diet, normal lochia. Pain controlled.   Bleeding much improved since yesterdays pp hemorrhage.   Patient and  already told me yesterday they wanted to go home today if possible.   Vitals:    24 0200 24 0300 24 0500 24 0600   BP: 114/59   91/56   Pulse: 65 65 63 60   Resp: 18  18   Temp: 36.8 °C (98.2 °F)   36.7 °C (98 °F)   TempSrc: Temporal   Temporal   SpO2: 97% 96% 97% 95%   Weight:       Height:           Exam:  Gen: no acute distress  Abdomen:soft  nt/nd firm fundus  Inc: c/d/i with postop dressing  Ext: no calf pain grace LE    Labs:   Recent Results (from the past 24 hour(s))   Hold Blood Bank Specimen (Not Tested)    Collection Time: 24  8:15 AM   Result Value Ref Range    Holding Tube - Bb DONE    CBC with Differential    Collection Time: 24  8:15 AM   Result Value Ref Range    WBC 9.2 4.8 - 10.8 K/uL    RBC 4.56 4.20 - 5.40 M/uL    Hemoglobin 13.9 12.0 - 16.0 g/dL    Hematocrit 39.3 37.0 - 47.0 %    MCV 86.2 81.4 - 97.8 fL    MCH 30.5 27.0 - 33.0 pg    MCHC 35.4 32.2 - 35.5 g/dL    RDW 44.6 35.9 - 50.0 fL    Platelet Count 165 164 - 446 K/uL    MPV 9.8 9.0 - 12.9 fL    Neutrophils-Polys 71.70 44.00 - 72.00 %    Lymphocytes 19.30 (L) 22.00 - 41.00 %    Monocytes 6.60 0.00 - 13.40 %    Eosinophils 0.90 0.00 - 6.90 %    Basophils 0.30 0.00 - 1.80 %    Immature Granulocytes 1.20 (H) 0.00 - 0.90 %    Nucleated RBC 0.00 0.00 - 0.20 /100 WBC    Neutrophils (Absolute) 6.57 1.82 - 7.42 K/uL    Lymphs (Absolute) 1.77 1.00 - 4.80 K/uL    Monos (Absolute) 0.60 0.00 - 0.85 K/uL    Eos (Absolute) 0.08 0.00 - 0.51 K/uL    Baso (Absolute) 0.03 0.00 - 0.12 K/uL    Immature Granulocytes (abs) 0.11 0.00 - 0.11 K/uL    NRBC (Absolute) 0.00 K/uL   T.PALLIDUM AB SASKIA (Syphilis)     Collection Time: 03/16/24  8:15 AM   Result Value Ref Range    Syphilis, Treponemal Qual Non-Reactive Non-Reactive   CBC WITHOUT DIFFERENTIAL    Collection Time: 03/16/24  3:50 PM   Result Value Ref Range    WBC 18.8 (H) 4.8 - 10.8 K/uL    RBC 4.32 4.20 - 5.40 M/uL    Hemoglobin 13.3 12.0 - 16.0 g/dL    Hematocrit 37.6 37.0 - 47.0 %    MCV 87.0 81.4 - 97.8 fL    MCH 30.8 27.0 - 33.0 pg    MCHC 35.4 32.2 - 35.5 g/dL    RDW 44.3 35.9 - 50.0 fL    Platelet Count 181 164 - 446 K/uL    MPV 9.5 9.0 - 12.9 fL   CBC WITHOUT DIFFERENTIAL    Collection Time: 03/16/24  9:57 PM   Result Value Ref Range    WBC 17.4 (H) 4.8 - 10.8 K/uL    RBC 3.70 (L) 4.20 - 5.40 M/uL    Hemoglobin 11.1 (L) 12.0 - 16.0 g/dL    Hematocrit 32.1 (L) 37.0 - 47.0 %    MCV 86.8 81.4 - 97.8 fL    MCH 30.0 27.0 - 33.0 pg    MCHC 34.6 32.2 - 35.5 g/dL    RDW 43.3 35.9 - 50.0 fL    Platelet Count 158 (L) 164 - 446 K/uL    MPV 9.7 9.0 - 12.9 fL       Assessment:  POD 1 s/p Repeat c/s  PP hemorrhage    Plan:  H/h stable after her pp hemorrhage yesterday  She really wanted to still go home this pm.   Advised her to see how she feels this pm first  Discharge home  Encourage ambulation  Pelvic rest, no heavy lifting/pulling /pushing  F/u in my office 2 weeks for a c/s check if indicated or 6 weeks postpartum  Continue prenatal vitamins daily  Give Rhogam if Rh negative and indicated  Give MMR if indcated prior to discharge  Encourage breastfeeding  Dressing to remain in place for 1 week and then may remove.     Micki Pepe M.D.

## 2024-03-17 NOTE — DISCHARGE SUMMARY
Discharge Summary:      Brunilda Webber    Admit Date:   3/16/2024  Discharge Date:  3/17/2024     Admitting diagnosis:  Labor and delivery, indication for care [O75.9]  Discharge Diagnosis: Status post  for repeat.  Pregnancy Complications: IVF pregnancy          History:  Past Medical History:   Diagnosis Date    Bowel habit changes 2024    constipation, medicated    Pregnant 2024    at present    Vitamin D deficiency      OB History    Para Term  AB Living   5 2 2   1 2   SAB IAB Ectopic Molar Multiple Live Births           0 2      # Outcome Date GA Lbr Wild/2nd Weight Sex Delivery Anes PTL Lv   5 Term 24 39w0d  3.49 kg (7 lb 11.1 oz) F CS-LTranv Spinal N KEESHA   4 Term 21 39w0d   F CS-LTranv Spinal N KEESHA   3             2             1 AB                 Onion  Patient Active Problem List    Diagnosis Date Noted    Labor and delivery, indication for care 2024    Intra-abdominal abscess (HCC) 2022    Ascites 2022    Ileus (HCC) 2022    Septic shock, ascites, possible pelvic abscess (HCC) 2022    Generalized abdominal pain 2022    Hepatosplenomegaly 2022    Hyponatremia 2022    Contraception management 2022    Appendicitis with peritonitis 2022    Postoperative pain 2021    Altered taste 2019    Weight gain finding 2019    Food allergy 2019    Bowel habit changes 2019    Hematochezia 2019    Vitamin D deficiency         Hospital Course:   33 y.o. , now para 2, was admitted with the above mentioned diagnosis, underwent Repeat  repeat,  for repeat. Patient postpartum course was significant for pp hemorrhage as she likely had a partial accreta that required uterine curetting at her c/s to remove the placenta. Thin lower uterine segment and scar tissue present from her multiple abd surgeries. , with progressive advancement in diet ,  ambulation and toleration of oral analgesia. Patient without complaints today and desires discharge.  Even prior to surgery she had told me she wanted to go home on postop day 1.     Vitals:    03/17/24 0200 03/17/24 0300 03/17/24 0500 03/17/24 0600   BP: 114/59   91/56   Pulse: 65 65 63 60   Resp: 18 16 16 18   Temp: 36.8 °C (98.2 °F)   36.7 °C (98 °F)   TempSrc: Temporal   Temporal   SpO2: 97% 96% 97% 95%   Weight:       Height:           Current Facility-Administered Medications   Medication Dose    LR infusion      acetaminophen (Tylenol) tablet 1,000 mg  1,000 mg    ketorolac (Toradol) 15 MG/ML injection 15 mg  15 mg    oxyCODONE immediate-release (Roxicodone) tablet 5 mg  5 mg    oxyCODONE immediate release (Roxicodone) tablet 10 mg  10 mg    HYDROmorphone (Dilaudid) injection 0.2 mg  0.2 mg    HYDROmorphone (Dilaudid) injection 0.4 mg  0.4 mg    ePHEDrine injection 10 mg  10 mg    ondansetron (Zofran) syringe/vial injection 4 mg  4 mg    diphenhydrAMINE (Benadryl) injection 12.5 mg  12.5 mg    diphenhydrAMINE (Benadryl) injection 12.5 mg  12.5 mg    Or    diphenhydrAMINE (Benadryl) injection 25 mg  25 mg    Or    naloxone HCl (Narcan) 20 mg in  mL infusion  0.4 mg/hr    lactated ringers infusion      ibuprofen (Motrin) tablet 800 mg  800 mg    Followed by    [START ON 3/20/2024] ibuprofen (Motrin) tablet 800 mg  800 mg    acetaminophen (Tylenol) tablet 1,000 mg  1,000 mg    Followed by    [START ON 3/20/2024] acetaminophen (Tylenol) tablet 1,000 mg  1,000 mg    oxyCODONE immediate-release (Roxicodone) tablet 5 mg  5 mg    oxyCODONE immediate release (Roxicodone) tablet 10 mg  10 mg    ondansetron (Zofran) syringe/vial injection 4 mg  4 mg    Or    ondansetron (Zofran ODT) dispertab 4 mg  4 mg    diphenhydrAMINE (Benadryl) tablet/capsule 25 mg  25 mg    Or    diphenhydrAMINE (Benadryl) injection 25 mg  25 mg    docusate sodium (Colace) capsule 100 mg  100 mg    tetanus-dipth-acell pertussis (Tdap) inj  0.5 mL  0.5 mL    prenatal plus vitamin (Stuartnatal 1+1) 27-1 MG tablet 1 Tablet  1 Tablet    simethicone (Mylicon) chewable tablet 125 mg  125 mg    calcium carbonate (Tums) chewable tab 1,000 mg  1,000 mg    Magnesium CAPS 500 mg  500 mg       Exam: see todays progress note     Labs:  Recent Labs     03/16/24  0815 03/16/24  1550 03/16/24  2157   WBC 9.2 18.8* 17.4*   RBC 4.56 4.32 3.70*   HEMOGLOBIN 13.9 13.3 11.1*   HEMATOCRIT 39.3 37.6 32.1*   MCV 86.2 87.0 86.8   MCH 30.5 30.8 30.0   MCHC 35.4 35.4 34.6   RDW 44.6 44.3 43.3   PLATELETCT 165 181 158*   MPV 9.8 9.5 9.7        Activity:   Discharge to home  Pelvic Rest x 6 weeks    Assessment: POD 1 s/p repeat c/s  Discharge Assessment: stable for discharge     Follow up 2  week for incision check.      Discharge Meds:   Current Outpatient Medications   Medication Sig Dispense Refill    acetaminophen (TYLENOL) 500 MG Tab Take 2 Tablets by mouth every 6 hours for 3 days, THEN 2 Tablets every 6 hours as needed for Mild Pain for up to 7 days.      acetaminophen (TYLENOL) 500 MG Tab Take 2 Tablets by mouth 3 times a day. 30 Tablet 0    docusate sodium 100 MG Cap Take 100 mg by mouth 2 times a day as needed for Constipation. 60 Capsule 0    ibuprofen (MOTRIN) 800 MG Tab Take 1 Tablet by mouth every 8 hours as needed for Moderate Pain for 14 days, THEN 1 Tablet every 8 hours as needed for up to 7 days. 30 Tablet 1    oxyCODONE immediate-release (ROXICODONE) 5 MG Tab Take 1 Tablet by mouth every four hours as needed for Severe Pain for up to 7 days. Indications: Acute Pain 20 Tablet 0       Micki Pepe M.D.

## 2024-03-17 NOTE — PROGRESS NOTES
9486  Received report from GERALDO Huber at change of shift. Patient assessed and POC discussed. Patient is resting in bed. Fundus firm, lochia light.  Patient denies any needs at this time. Call light within reach, bed in lowest position. Patient is encouraged to call for pain/med interventions and any other needs.    4350   Discharge instructions reviewed. Verbalized understanding. Documents signed    5323   Left facility. Escorted by staff

## 2024-03-17 NOTE — DISCHARGE INSTRUCTIONS

## 2024-03-17 NOTE — PROGRESS NOTES
1905: Received report from day shift RNPushpa. Greeted pt and SO at the bedside. Whiteboard updated. Dura check complete. HR of 54 bpm, pt denies dizziness/lightheadedness. Fundal check complete - pt is firm and at 2 fingerbreadths below the umbilicus, scant lochia. Encouraged pt to drink lots of water, 4 jugs of water placed at the bedside.    Updated CNA, Gen, that pt will be placed on q 4 hr VS checks due to having a hemorrhage during day shift. Gen verbalized understanding.    2055: Completed assessment. Pt complains of pain at this time. Pain medication was administered. IV in place, no signs of phlebitis or infiltration. Saline locked. POC discussed: pain control, lochia, hydration, plan to remove Charles catheter, uterine fundal checks, and ambulation. Reinforced education on emergency and non-emergency call light system, and bed safety.     Educated pt to press the red emergency button on the side rails if she experiences:  Sudden dizziness  Gushing of blood  Soaking a pad front and back within an hour  Passing a clot bigger than an egg size  Infant cyanotic    Pt verbalized understanding. Call light placed within reach.     Pt states that she would like no visitors.    Pt states that she would like to take her home medication tonight: Magnesium caps PO 4 tabs of 500 mg q night for [facility] (non-prescription). Will contact Dr. Pepe if it is safe to do so.    2136: Updated Dr. Pepe on pt wanting to take their own supplied Magnesium. Per Dr. Pepe, it is okay for her to do so. Nursing communication placed.    Magnesium bottle given to Pharmacist, Bakari, to verify.    2225: Report given to GERALDO Huber. Updated him that pt's blood was drawn for their 2200 CBC without differential order, and to expect results. Updated him on pending verification of Magnesium tabs. Relinquished care at this time.

## 2024-04-02 ENCOUNTER — PHARMACY VISIT (OUTPATIENT)
Dept: PHARMACY | Facility: MEDICAL CENTER | Age: 34
End: 2024-04-02
Payer: COMMERCIAL

## 2024-04-02 PROCEDURE — RXMED WILLOW AMBULATORY MEDICATION CHARGE: Performed by: OBSTETRICS & GYNECOLOGY

## 2025-03-25 ENCOUNTER — HOSPITAL ENCOUNTER (OUTPATIENT)
Dept: LAB | Facility: MEDICAL CENTER | Age: 35
End: 2025-03-25
Attending: ADVANCED PRACTICE MIDWIFE
Payer: COMMERCIAL

## 2025-03-25 LAB
ALBUMIN SERPL BCP-MCNC: 4 G/DL (ref 3.2–4.9)
ALBUMIN/GLOB SERPL: 1.5 G/DL
ALP SERPL-CCNC: 44 U/L (ref 30–99)
ALT SERPL-CCNC: 12 U/L (ref 2–50)
ANION GAP SERPL CALC-SCNC: 9 MMOL/L (ref 7–16)
AST SERPL-CCNC: 24 U/L (ref 12–45)
BASOPHILS # BLD AUTO: 1 % (ref 0–1.8)
BASOPHILS # BLD: 0.05 K/UL (ref 0–0.12)
BILIRUB SERPL-MCNC: 0.2 MG/DL (ref 0.1–1.5)
BUN SERPL-MCNC: 15 MG/DL (ref 8–22)
CALCIUM ALBUM COR SERPL-MCNC: 8.9 MG/DL (ref 8.5–10.5)
CALCIUM SERPL-MCNC: 8.9 MG/DL (ref 8.5–10.5)
CHLORIDE SERPL-SCNC: 105 MMOL/L (ref 96–112)
CHOLEST SERPL-MCNC: 154 MG/DL (ref 100–199)
CO2 SERPL-SCNC: 22 MMOL/L (ref 20–33)
CREAT SERPL-MCNC: 0.82 MG/DL (ref 0.5–1.4)
EOSINOPHIL # BLD AUTO: 0.11 K/UL (ref 0–0.51)
EOSINOPHIL NFR BLD: 2.1 % (ref 0–6.9)
ERYTHROCYTE [DISTWIDTH] IN BLOOD BY AUTOMATED COUNT: 41.4 FL (ref 35.9–50)
EST. AVERAGE GLUCOSE BLD GHB EST-MCNC: 100 MG/DL
FSH SERPL-ACNC: 6.5 MIU/ML
GFR SERPLBLD CREATININE-BSD FMLA CKD-EPI: 97 ML/MIN/1.73 M 2
GLOBULIN SER CALC-MCNC: 2.7 G/DL (ref 1.9–3.5)
GLUCOSE SERPL-MCNC: 82 MG/DL (ref 65–99)
HBA1C MFR BLD: 5.1 % (ref 4–5.6)
HCT VFR BLD AUTO: 39.3 % (ref 37–47)
HDLC SERPL-MCNC: 45 MG/DL
HGB BLD-MCNC: 13.1 G/DL (ref 12–16)
IMM GRANULOCYTES # BLD AUTO: 0.01 K/UL (ref 0–0.11)
IMM GRANULOCYTES NFR BLD AUTO: 0.2 % (ref 0–0.9)
LDLC SERPL CALC-MCNC: 96 MG/DL
LH SERPL-ACNC: 7.9 IU/L
LYMPHOCYTES # BLD AUTO: 2.05 K/UL (ref 1–4.8)
LYMPHOCYTES NFR BLD: 39.6 % (ref 22–41)
MCH RBC QN AUTO: 27.5 PG (ref 27–33)
MCHC RBC AUTO-ENTMCNC: 33.3 G/DL (ref 32.2–35.5)
MCV RBC AUTO: 82.4 FL (ref 81.4–97.8)
MONOCYTES # BLD AUTO: 0.41 K/UL (ref 0–0.85)
MONOCYTES NFR BLD AUTO: 7.9 % (ref 0–13.4)
NEUTROPHILS # BLD AUTO: 2.55 K/UL (ref 1.82–7.42)
NEUTROPHILS NFR BLD: 49.2 % (ref 44–72)
NRBC # BLD AUTO: 0 K/UL
NRBC BLD-RTO: 0 /100 WBC (ref 0–0.2)
PLATELET # BLD AUTO: 226 K/UL (ref 164–446)
PMV BLD AUTO: 10.2 FL (ref 9–12.9)
POTASSIUM SERPL-SCNC: 5 MMOL/L (ref 3.6–5.5)
PROT SERPL-MCNC: 6.7 G/DL (ref 6–8.2)
RBC # BLD AUTO: 4.77 M/UL (ref 4.2–5.4)
SODIUM SERPL-SCNC: 136 MMOL/L (ref 135–145)
TRIGL SERPL-MCNC: 64 MG/DL (ref 0–149)
TSH SERPL-ACNC: 1.82 UIU/ML (ref 0.35–5.5)
WBC # BLD AUTO: 5.2 K/UL (ref 4.8–10.8)

## 2025-03-25 PROCEDURE — 80061 LIPID PANEL: CPT

## 2025-03-25 PROCEDURE — 36415 COLL VENOUS BLD VENIPUNCTURE: CPT

## 2025-03-25 PROCEDURE — 84403 ASSAY OF TOTAL TESTOSTERONE: CPT

## 2025-03-25 PROCEDURE — 85025 COMPLETE CBC W/AUTO DIFF WBC: CPT

## 2025-03-25 PROCEDURE — 84443 ASSAY THYROID STIM HORMONE: CPT

## 2025-03-25 PROCEDURE — 80053 COMPREHEN METABOLIC PANEL: CPT

## 2025-03-25 PROCEDURE — 83036 HEMOGLOBIN GLYCOSYLATED A1C: CPT

## 2025-03-25 PROCEDURE — 83001 ASSAY OF GONADOTROPIN (FSH): CPT

## 2025-03-25 PROCEDURE — 83002 ASSAY OF GONADOTROPIN (LH): CPT

## 2025-03-29 LAB — TESTOST SERPL-MCNC: 20 NG/DL (ref 9–55)

## 2025-04-24 ENCOUNTER — APPOINTMENT (OUTPATIENT)
Dept: RADIOLOGY | Facility: MEDICAL CENTER | Age: 35
End: 2025-04-24
Attending: OBSTETRICS & GYNECOLOGY
Payer: COMMERCIAL

## 2025-04-24 DIAGNOSIS — Z01.818 PREOPERATIVE TESTING: ICD-10-CM

## 2025-04-24 PROCEDURE — 71046 X-RAY EXAM CHEST 2 VIEWS: CPT

## 2025-04-28 ENCOUNTER — HOSPITAL ENCOUNTER (OUTPATIENT)
Dept: CARDIOLOGY | Facility: MEDICAL CENTER | Age: 35
End: 2025-04-28
Attending: OBSTETRICS & GYNECOLOGY
Payer: COMMERCIAL

## 2025-04-28 LAB — EKG IMPRESSION: NORMAL

## 2025-04-28 PROCEDURE — 93005 ELECTROCARDIOGRAM TRACING: CPT | Mod: TC | Performed by: OBSTETRICS & GYNECOLOGY

## 2025-04-29 ENCOUNTER — HOSPITAL ENCOUNTER (OUTPATIENT)
Dept: LAB | Facility: MEDICAL CENTER | Age: 35
End: 2025-04-29
Attending: OBSTETRICS & GYNECOLOGY
Payer: COMMERCIAL

## 2025-04-29 LAB
ANION GAP SERPL CALC-SCNC: 8 MMOL/L (ref 7–16)
BUN SERPL-MCNC: 16 MG/DL (ref 8–22)
CALCIUM SERPL-MCNC: 9 MG/DL (ref 8.5–10.5)
CHLORIDE SERPL-SCNC: 104 MMOL/L (ref 96–112)
CO2 SERPL-SCNC: 24 MMOL/L (ref 20–33)
CREAT SERPL-MCNC: 0.75 MG/DL (ref 0.5–1.4)
ERYTHROCYTE [DISTWIDTH] IN BLOOD BY AUTOMATED COUNT: 42.4 FL (ref 35.9–50)
GFR SERPLBLD CREATININE-BSD FMLA CKD-EPI: 108 ML/MIN/1.73 M 2
GLUCOSE SERPL-MCNC: 95 MG/DL (ref 65–99)
HCG UR QL: NEGATIVE
HCT VFR BLD AUTO: 40.9 % (ref 37–47)
HGB BLD-MCNC: 13.5 G/DL (ref 12–16)
MCH RBC QN AUTO: 27.3 PG (ref 27–33)
MCHC RBC AUTO-ENTMCNC: 33 G/DL (ref 32.2–35.5)
MCV RBC AUTO: 82.6 FL (ref 81.4–97.8)
PLATELET # BLD AUTO: 238 K/UL (ref 164–446)
PMV BLD AUTO: 10.2 FL (ref 9–12.9)
POTASSIUM SERPL-SCNC: 4.9 MMOL/L (ref 3.6–5.5)
RBC # BLD AUTO: 4.95 M/UL (ref 4.2–5.4)
SODIUM SERPL-SCNC: 136 MMOL/L (ref 135–145)
WBC # BLD AUTO: 6.6 K/UL (ref 4.8–10.8)

## 2025-04-29 PROCEDURE — 36415 COLL VENOUS BLD VENIPUNCTURE: CPT

## 2025-04-29 PROCEDURE — 85027 COMPLETE CBC AUTOMATED: CPT

## 2025-04-29 PROCEDURE — 81025 URINE PREGNANCY TEST: CPT

## 2025-04-29 PROCEDURE — 80048 BASIC METABOLIC PNL TOTAL CA: CPT

## 2025-05-02 ENCOUNTER — APPOINTMENT (OUTPATIENT)
Dept: ADMISSIONS | Facility: MEDICAL CENTER | Age: 35
End: 2025-05-02
Attending: STUDENT IN AN ORGANIZED HEALTH CARE EDUCATION/TRAINING PROGRAM
Payer: COMMERCIAL

## 2025-05-09 ENCOUNTER — PRE-ADMISSION TESTING (OUTPATIENT)
Dept: ADMISSIONS | Facility: MEDICAL CENTER | Age: 35
End: 2025-05-09
Attending: STUDENT IN AN ORGANIZED HEALTH CARE EDUCATION/TRAINING PROGRAM
Payer: COMMERCIAL

## 2025-05-09 NOTE — PREADMIT AVS NOTE
Current Medications   Medication Instructions    Omega-3 Fatty Acids (FISH OIL PO) HOLD 7 DAYS PRIOR TO SURGERY/PROCEDURE.     Magnesium 500 MG Cap HOLD 7 DAYS PRIOR TO SURGERY/PROCEDURE.     Cholecalciferol (VITAMIN D3) 50 MCG (2000 UT) Tab HOLD 7 DAYS PRIOR TO SURGERY/PROCEDURE.

## 2025-05-12 ENCOUNTER — PRE-ADMISSION TESTING (OUTPATIENT)
Dept: ADMISSIONS | Facility: MEDICAL CENTER | Age: 35
End: 2025-05-12
Attending: STUDENT IN AN ORGANIZED HEALTH CARE EDUCATION/TRAINING PROGRAM
Payer: COMMERCIAL

## 2025-05-12 DIAGNOSIS — Z01.812 PRE-OPERATIVE LABORATORY EXAMINATION: ICD-10-CM

## 2025-05-12 LAB
ABO GROUP BLD: NORMAL
BLD GP AB SCN SERPL QL: NORMAL
HCG SERPL QL: NEGATIVE
RH BLD: NORMAL

## 2025-05-12 PROCEDURE — 86901 BLOOD TYPING SEROLOGIC RH(D): CPT

## 2025-05-12 PROCEDURE — 84703 CHORIONIC GONADOTROPIN ASSAY: CPT

## 2025-05-12 PROCEDURE — 86850 RBC ANTIBODY SCREEN: CPT

## 2025-05-12 PROCEDURE — 36415 COLL VENOUS BLD VENIPUNCTURE: CPT

## 2025-05-12 PROCEDURE — 86922 COMPATIBILITY TEST ANTIGLOB: CPT

## 2025-05-12 PROCEDURE — 86900 BLOOD TYPING SEROLOGIC ABO: CPT

## 2025-05-16 ENCOUNTER — ANESTHESIA EVENT (OUTPATIENT)
Dept: SURGERY | Facility: MEDICAL CENTER | Age: 35
End: 2025-05-16
Payer: COMMERCIAL

## 2025-05-16 ENCOUNTER — ANESTHESIA (OUTPATIENT)
Dept: SURGERY | Facility: MEDICAL CENTER | Age: 35
End: 2025-05-16
Payer: COMMERCIAL

## 2025-05-16 ENCOUNTER — HOSPITAL ENCOUNTER (OUTPATIENT)
Facility: MEDICAL CENTER | Age: 35
End: 2025-05-16
Attending: STUDENT IN AN ORGANIZED HEALTH CARE EDUCATION/TRAINING PROGRAM | Admitting: STUDENT IN AN ORGANIZED HEALTH CARE EDUCATION/TRAINING PROGRAM
Payer: COMMERCIAL

## 2025-05-16 VITALS
HEIGHT: 67 IN | SYSTOLIC BLOOD PRESSURE: 123 MMHG | TEMPERATURE: 97.7 F | HEART RATE: 58 BPM | BODY MASS INDEX: 31.83 KG/M2 | RESPIRATION RATE: 19 BRPM | WEIGHT: 202.82 LBS | OXYGEN SATURATION: 98 % | DIASTOLIC BLOOD PRESSURE: 59 MMHG

## 2025-05-16 LAB
HCG UR QL: NEGATIVE
PATHOLOGY CONSULT NOTE: NORMAL

## 2025-05-16 PROCEDURE — 160025 RECOVERY II MINUTES (STATS): Performed by: STUDENT IN AN ORGANIZED HEALTH CARE EDUCATION/TRAINING PROGRAM

## 2025-05-16 PROCEDURE — 700101 HCHG RX REV CODE 250: Performed by: ANESTHESIOLOGY

## 2025-05-16 PROCEDURE — 88307 TISSUE EXAM BY PATHOLOGIST: CPT | Performed by: PATHOLOGY

## 2025-05-16 PROCEDURE — 700111 HCHG RX REV CODE 636 W/ 250 OVERRIDE (IP): Performed by: ANESTHESIOLOGY

## 2025-05-16 PROCEDURE — 160048 HCHG OR STATISTICAL LEVEL 1-5: Performed by: STUDENT IN AN ORGANIZED HEALTH CARE EDUCATION/TRAINING PROGRAM

## 2025-05-16 PROCEDURE — 700101 HCHG RX REV CODE 250: Performed by: STUDENT IN AN ORGANIZED HEALTH CARE EDUCATION/TRAINING PROGRAM

## 2025-05-16 PROCEDURE — 81025 URINE PREGNANCY TEST: CPT

## 2025-05-16 PROCEDURE — 88305 TISSUE EXAM BY PATHOLOGIST: CPT | Mod: 26 | Performed by: PATHOLOGY

## 2025-05-16 PROCEDURE — 160002 HCHG RECOVERY MINUTES (STAT): Performed by: STUDENT IN AN ORGANIZED HEALTH CARE EDUCATION/TRAINING PROGRAM

## 2025-05-16 PROCEDURE — 160038 HCHG SURGERY MINUTES - EA ADDL 1 MIN LEVEL 2: Performed by: STUDENT IN AN ORGANIZED HEALTH CARE EDUCATION/TRAINING PROGRAM

## 2025-05-16 PROCEDURE — 88307 TISSUE EXAM BY PATHOLOGIST: CPT | Mod: 26 | Performed by: PATHOLOGY

## 2025-05-16 PROCEDURE — 700105 HCHG RX REV CODE 258: Performed by: ANESTHESIOLOGY

## 2025-05-16 PROCEDURE — 160035 HCHG PACU - 1ST 60 MINS PHASE I: Performed by: STUDENT IN AN ORGANIZED HEALTH CARE EDUCATION/TRAINING PROGRAM

## 2025-05-16 PROCEDURE — 160009 HCHG ANES TIME/MIN: Performed by: STUDENT IN AN ORGANIZED HEALTH CARE EDUCATION/TRAINING PROGRAM

## 2025-05-16 PROCEDURE — 160015 HCHG STAT PREOP MINUTES: Performed by: STUDENT IN AN ORGANIZED HEALTH CARE EDUCATION/TRAINING PROGRAM

## 2025-05-16 PROCEDURE — 160027 HCHG SURGERY MINUTES - 1ST 30 MINS LEVEL 2: Performed by: STUDENT IN AN ORGANIZED HEALTH CARE EDUCATION/TRAINING PROGRAM

## 2025-05-16 PROCEDURE — 88305 TISSUE EXAM BY PATHOLOGIST: CPT | Performed by: PATHOLOGY

## 2025-05-16 PROCEDURE — 160046 HCHG PACU - 1ST 60 MINS PHASE II: Performed by: STUDENT IN AN ORGANIZED HEALTH CARE EDUCATION/TRAINING PROGRAM

## 2025-05-16 RX ORDER — KETOROLAC TROMETHAMINE 15 MG/ML
INJECTION, SOLUTION INTRAMUSCULAR; INTRAVENOUS PRN
Status: DISCONTINUED | OUTPATIENT
Start: 2025-05-16 | End: 2025-05-16 | Stop reason: SURG

## 2025-05-16 RX ORDER — LIDOCAINE HYDROCHLORIDE AND EPINEPHRINE 10; 10 MG/ML; UG/ML
INJECTION, SOLUTION INFILTRATION; PERINEURAL
Status: DISCONTINUED | OUTPATIENT
Start: 2025-05-16 | End: 2025-05-16 | Stop reason: HOSPADM

## 2025-05-16 RX ORDER — OXYCODONE HCL 5 MG/5 ML
5 SOLUTION, ORAL ORAL
Status: DISCONTINUED | OUTPATIENT
Start: 2025-05-16 | End: 2025-05-16 | Stop reason: HOSPADM

## 2025-05-16 RX ORDER — HYDROMORPHONE HYDROCHLORIDE 1 MG/ML
0.2 INJECTION, SOLUTION INTRAMUSCULAR; INTRAVENOUS; SUBCUTANEOUS
Status: DISCONTINUED | OUTPATIENT
Start: 2025-05-16 | End: 2025-05-16 | Stop reason: HOSPADM

## 2025-05-16 RX ORDER — HYDRALAZINE HYDROCHLORIDE 20 MG/ML
5 INJECTION INTRAMUSCULAR; INTRAVENOUS
Status: DISCONTINUED | OUTPATIENT
Start: 2025-05-16 | End: 2025-05-16 | Stop reason: HOSPADM

## 2025-05-16 RX ORDER — EPHEDRINE SULFATE 50 MG/ML
5 INJECTION, SOLUTION INTRAVENOUS
Status: DISCONTINUED | OUTPATIENT
Start: 2025-05-16 | End: 2025-05-16 | Stop reason: HOSPADM

## 2025-05-16 RX ORDER — SODIUM CHLORIDE, SODIUM LACTATE, POTASSIUM CHLORIDE, CALCIUM CHLORIDE 600; 310; 30; 20 MG/100ML; MG/100ML; MG/100ML; MG/100ML
INJECTION, SOLUTION INTRAVENOUS
Status: DISCONTINUED | OUTPATIENT
Start: 2025-05-16 | End: 2025-05-16 | Stop reason: SURG

## 2025-05-16 RX ORDER — HYDROMORPHONE HYDROCHLORIDE 1 MG/ML
0.1 INJECTION, SOLUTION INTRAMUSCULAR; INTRAVENOUS; SUBCUTANEOUS
Status: DISCONTINUED | OUTPATIENT
Start: 2025-05-16 | End: 2025-05-16 | Stop reason: HOSPADM

## 2025-05-16 RX ORDER — GLYCOPYRROLATE 0.2 MG/ML
INJECTION INTRAMUSCULAR; INTRAVENOUS PRN
Status: DISCONTINUED | OUTPATIENT
Start: 2025-05-16 | End: 2025-05-16 | Stop reason: SURG

## 2025-05-16 RX ORDER — DIPHENHYDRAMINE HYDROCHLORIDE 50 MG/ML
12.5 INJECTION, SOLUTION INTRAMUSCULAR; INTRAVENOUS
Status: DISCONTINUED | OUTPATIENT
Start: 2025-05-16 | End: 2025-05-16 | Stop reason: HOSPADM

## 2025-05-16 RX ORDER — SODIUM CHLORIDE, SODIUM LACTATE, POTASSIUM CHLORIDE, CALCIUM CHLORIDE 600; 310; 30; 20 MG/100ML; MG/100ML; MG/100ML; MG/100ML
INJECTION, SOLUTION INTRAVENOUS CONTINUOUS
Status: DISCONTINUED | OUTPATIENT
Start: 2025-05-16 | End: 2025-05-16 | Stop reason: HOSPADM

## 2025-05-16 RX ORDER — ALBUTEROL SULFATE 5 MG/ML
2.5 SOLUTION RESPIRATORY (INHALATION)
Status: DISCONTINUED | OUTPATIENT
Start: 2025-05-16 | End: 2025-05-16 | Stop reason: HOSPADM

## 2025-05-16 RX ORDER — ONDANSETRON 2 MG/ML
INJECTION INTRAMUSCULAR; INTRAVENOUS PRN
Status: DISCONTINUED | OUTPATIENT
Start: 2025-05-16 | End: 2025-05-16 | Stop reason: SURG

## 2025-05-16 RX ORDER — ONDANSETRON 2 MG/ML
4 INJECTION INTRAMUSCULAR; INTRAVENOUS
Status: DISCONTINUED | OUTPATIENT
Start: 2025-05-16 | End: 2025-05-16 | Stop reason: HOSPADM

## 2025-05-16 RX ORDER — HYDROMORPHONE HYDROCHLORIDE 1 MG/ML
0.4 INJECTION, SOLUTION INTRAMUSCULAR; INTRAVENOUS; SUBCUTANEOUS
Status: DISCONTINUED | OUTPATIENT
Start: 2025-05-16 | End: 2025-05-16 | Stop reason: HOSPADM

## 2025-05-16 RX ORDER — HALOPERIDOL 5 MG/ML
1 INJECTION INTRAMUSCULAR
Status: DISCONTINUED | OUTPATIENT
Start: 2025-05-16 | End: 2025-05-16 | Stop reason: HOSPADM

## 2025-05-16 RX ORDER — OXYCODONE HCL 5 MG/5 ML
10 SOLUTION, ORAL ORAL
Status: DISCONTINUED | OUTPATIENT
Start: 2025-05-16 | End: 2025-05-16 | Stop reason: HOSPADM

## 2025-05-16 RX ORDER — MIDAZOLAM HYDROCHLORIDE 1 MG/ML
1 INJECTION INTRAMUSCULAR; INTRAVENOUS
Status: DISCONTINUED | OUTPATIENT
Start: 2025-05-16 | End: 2025-05-16 | Stop reason: HOSPADM

## 2025-05-16 RX ORDER — DEXAMETHASONE SODIUM PHOSPHATE 4 MG/ML
INJECTION, SOLUTION INTRA-ARTICULAR; INTRALESIONAL; INTRAMUSCULAR; INTRAVENOUS; SOFT TISSUE PRN
Status: DISCONTINUED | OUTPATIENT
Start: 2025-05-16 | End: 2025-05-16 | Stop reason: HOSPADM

## 2025-05-16 RX ORDER — LIDOCAINE HYDROCHLORIDE 20 MG/ML
INJECTION, SOLUTION EPIDURAL; INFILTRATION; INTRACAUDAL; PERINEURAL PRN
Status: DISCONTINUED | OUTPATIENT
Start: 2025-05-16 | End: 2025-05-16 | Stop reason: SURG

## 2025-05-16 RX ORDER — MIDAZOLAM HYDROCHLORIDE 1 MG/ML
INJECTION INTRAMUSCULAR; INTRAVENOUS PRN
Status: DISCONTINUED | OUTPATIENT
Start: 2025-05-16 | End: 2025-05-16 | Stop reason: SURG

## 2025-05-16 RX ORDER — MEPERIDINE HYDROCHLORIDE 25 MG/ML
6.25 INJECTION INTRAMUSCULAR; INTRAVENOUS; SUBCUTANEOUS
Status: DISCONTINUED | OUTPATIENT
Start: 2025-05-16 | End: 2025-05-16 | Stop reason: HOSPADM

## 2025-05-16 RX ADMIN — GLYCOPYRROLATE 0.3 MG: 0.2 INJECTION INTRAMUSCULAR; INTRAVENOUS at 09:51

## 2025-05-16 RX ADMIN — LIDOCAINE HYDROCHLORIDE 80 MG: 20 INJECTION, SOLUTION EPIDURAL; INFILTRATION; INTRACAUDAL; PERINEURAL at 09:31

## 2025-05-16 RX ADMIN — SODIUM CHLORIDE, POTASSIUM CHLORIDE, SODIUM LACTATE AND CALCIUM CHLORIDE: 600; 310; 30; 20 INJECTION, SOLUTION INTRAVENOUS at 09:26

## 2025-05-16 RX ADMIN — MIDAZOLAM HYDROCHLORIDE 2 MG: 1 INJECTION, SOLUTION INTRAMUSCULAR; INTRAVENOUS at 09:28

## 2025-05-16 RX ADMIN — KETOROLAC TROMETHAMINE 15 MG: 15 INJECTION, SOLUTION INTRAMUSCULAR; INTRAVENOUS at 10:07

## 2025-05-16 RX ADMIN — FENTANYL CITRATE 100 MCG: 50 INJECTION, SOLUTION INTRAMUSCULAR; INTRAVENOUS at 09:31

## 2025-05-16 RX ADMIN — ONDANSETRON 4 MG: 2 INJECTION INTRAMUSCULAR; INTRAVENOUS at 10:07

## 2025-05-16 RX ADMIN — PROPOFOL 150 MCG/KG/MIN: 10 INJECTION, EMULSION INTRAVENOUS at 09:32

## 2025-05-16 RX ADMIN — DEXAMETHASONE SODIUM PHOSPHATE 8 MG: 4 INJECTION INTRA-ARTICULAR; INTRALESIONAL; INTRAMUSCULAR; INTRAVENOUS; SOFT TISSUE at 09:33

## 2025-05-16 RX ADMIN — PROPOFOL 200 MG: 10 INJECTION, EMULSION INTRAVENOUS at 09:31

## 2025-05-16 ASSESSMENT — PAIN DESCRIPTION - PAIN TYPE
TYPE: ACUTE PAIN;SURGICAL PAIN
TYPE: ACUTE PAIN

## 2025-05-16 ASSESSMENT — PAIN SCALES - GENERAL: PAIN_LEVEL: 1

## 2025-05-16 ASSESSMENT — FIBROSIS 4 INDEX: FIB4 SCORE: 0.96

## 2025-05-16 NOTE — ANESTHESIA PROCEDURE NOTES
Airway    Date/Time: 5/16/2025 9:31 AM    Performed by: Sergio Swartz M.D.  Authorized by: Sergio wSartz M.D.    Location:  OR  Urgency:  Elective  Indications for Airway Management:  Anesthesia      Spontaneous Ventilation: absent    Sedation Level:  Deep  Preoxygenated: Yes    Mask Difficulty Assessment:  0 - not attempted  Final Airway Type:  Supraglottic airway  Final Supraglottic Airway:  Standard LMA    SGA Size:  4  Number of Attempts at Approach:  1         Spoke with Dr March they will see the pt

## 2025-05-16 NOTE — ANESTHESIA POSTPROCEDURE EVALUATION
Patient: Brunilda Webber    Procedure Summary       Date: 05/16/25 Room / Location: [place] [address] / SURGERY [facility]    Anesthesia Start: 0926 Anesthesia Stop: 1016    Procedure: VAGINAL EXAM UNDER ANESTHESIA, COLD KNIFE CONIZATION OF CERVIX, ENDOCERVICAL CURETTAGE (Vagina ) Diagnosis: (ENDOCERVICAL ADENOCARCINOMA IN SITU)    Surgeons: Prachi Bryant M.D. Responsible Provider: Sergio Swartz M.D.    Anesthesia Type: general ASA Status: 2            Final Anesthesia Type: general  Last vitals  BP   Blood Pressure: (!) 85/46    Temp   36.1 °C (96.9 °F)    Pulse   86   Resp   (!) 24    SpO2   97 %      Anesthesia Post Evaluation    Patient location during evaluation: PACU  Patient participation: complete - patient participated  Level of consciousness: awake  Pain score: 1    Airway patency: patent  Anesthetic complications: no  Cardiovascular status: adequate  Respiratory status: acceptable  Hydration status: stable    PONV: controlled          No notable events documented.     Nurse Pain Score: 1 (NPRS)

## 2025-05-16 NOTE — ANESTHESIA PREPROCEDURE EVALUATION
Case: [id number] Date/Time: 05/16/25 0915    Procedure: VAGINAL EXAM UNDER ANESTHESIA, COLD KNIFE CONIZATION OF CERVIX, ENDOCERVICAL CURETTAGE    Pre-op diagnosis: HIGH GRADE CERVICAL    Location: [facility] OR 17 / SURGERY [facility]    Surgeons: Prachi Bryant M.D.            Relevant Problems      (positive) Hepatosplenomegaly      Other   (positive) Ascites   (positive) Hepatosplenomegaly   (positive) Ileus (HCC)   (positive) Intra-abdominal abscess (HCC)   (positive) Postoperative pain   (positive) Septic shock, ascites, possible pelvic abscess (HCC)       Physical Exam    Airway   Mallampati: I  TM distance: >3 FB  Neck ROM: full       Cardiovascular   Rhythm: regular  Rate: normal     Dental - normal exam           Pulmonary Breath sounds clear to auscultation     Abdominal (-) obese     Neurological - normal exam                   Anesthesia Plan    ASA 2       Plan - general       Airway plan will be LMA          Induction: intravenous    Postoperative Plan: Postoperative administration of opioids is intended.    Pertinent diagnostic labs and testing reviewed    Informed Consent:    Anesthetic plan and risks discussed with patient.    Use of blood products discussed with: patient whom consented to blood products.

## 2025-05-16 NOTE — OP REPORT
OPERATIVE REPORT     DATE: 5/16/2025     PREOPERATIVE DIAGNOSIS:  At least endocervical adenocarcinoma in situ     POSTOPERATIVE DIAGNOSIS:  Same    PROCEDURES:  Exam under anesthesia, cervical cold knife conization, endocervical curettage    SURGEON: Prachi Bryant MD     COMPLICATIONS:  None     ANESTHESIA:  General      ESTIMATED BLOOD LOSS:  Minimal     IV FLUIDS:  Per anesthesia record     URINE OUTPUT:  Voided prior to procedure     DRAINS:  None     PATHOLOGY SPECIMENS:  1) Cervical cone specimen, stitch 12 o'clock  2) Endocervical curettage     OPERATIVE FINDINGS:  Bimanual with normal cervix, normal cervical fornices, normal size mobile uterus. No parametrial firmness.  Normal vulva, speculum exam with normal vaginal epithelium.  Cervix with approximately 1 x 0.5 cm red slightly raised papillary friable lesion at posterior cervix 6 to 7 o'clock. This lesion and surrounding area was non-staining with Lugol's. The non-staining area and lesion were removed in their entirety with the specimen. No other gross lesions were seen.  Uterus sounded to 7 cm.     INDICATIONS:   Brunilda Webber is a 35 y.o. with at least endocervical adenocarcinoma in situ. Cervical biopsies endocervical adenocarcinoma at least in situ, cannot exclude invasion at 6 and 8 o'clock, cervix polyp endocervical adenocarcinoma at least in situ cannot exclude invasion, ECC no dysplasia or carcinoma. Options were reviewed including risks, benefits, and alternatives, and she desired to proceed with surgical management.      DETAILS OF THE PROCEDURE:  The patient was brought to the operating room, where general anesthesia was found to be adequate. She was prepped and draped in the dorsal high lithotomy position in YellowFin stirrups. She voided prior to procedure. Surgical timeout was completed.      Exam under anesthesia was performed. Lugol's was applied. The above findings were noted. The anterior cervix was grasped with a tenaculum. A  sound was placed in the cervical os to anticipate the orientation of the conization. A total of 20 mL of 1% lidocaine with epinephrine was instilled into the cervical stroma at roughly 1 cm of depth circumferentially around the cervix.      An 15 blade was used to make an initial circumferential incision, then an 11 blade was used to complete the conization, grasping the specimen with a long Allis. After removal, a stay suture was placed at 12 o'clock on the cone specimen.      After the conization was removed and sent to Pathology with the oriented stitch, the endocervical curettage was performed and sent to pathology. The Bovie cautery with roller ball was used to coagulate the conization bed. Monsel solution was placed in the cervical os. A small piece of gel foam was placed in the conization bed and one stitch was placed anterior to apply pressure to a small bleeding area. The single-toothed tenaculum was removed and tenaculum site noted to be hemostatic. Hemostasis was ensured.    Sponge, lap, needle and instrument counts were correct and the patient went to recovery in stable condition.     Prachi Bryant MD  Gynecologic Oncology

## 2025-05-16 NOTE — OR NURSING
Chronic Disease Visit Information    BP Readings from Last 3 Encounters:   12/08/17 130/88   04/07/17 130/78   04/06/16 120/68          Hemoglobin A1C (%)   Date Value   05/20/2016 5.4     LDL Cholesterol (mg/dL)   Date Value   05/20/2016 99     LDL Calculated (mg/dL)   Date Value   07/16/2013 109     HDL (mg/dL)   Date Value   05/20/2016 48     BUN (mg/dL)   Date Value   05/20/2016 6     CREATININE (mg/dL)   Date Value   05/20/2016 0.55     Glucose (mg/dL)   Date Value   05/20/2016 93            Have you changed or started any medications since your last visit including any over-the-counter medicines, vitamins, or herbal medicines? no   Are you having any side effects from any of your medications? -  no  Have you stopped taking any of your medications? Is so, why? -  no    Have you seen any other physician or provider since your last visit? No  Have you had any other diagnostic tests since your last visit? No  Have you been seen in the emergency room and/or had an admission to a hospital since we last saw you? No  Have you had your annual diabetic retinal (eye) exam? Yes - Records Requested  Have you had your routine dental cleaning in the past 6 months? yes -     Have you activated your [employer] [facility] account? If not, what are your barriers?  No:      Patient Care Team:  Tom Ballard MD as PCP - General (Family Medicine)  Stephanie Ly MD as Consulting Physician (Neurology)         Medical History Review  Past Medical, Family, and Social History reviewed and does contribute to the patient presenting condition    Health Maintenance   Topic Date Due    DTaP/Tdap/Td vaccine (1 - Tdap) 01/07/2019 (Originally 2/4/1984)    Hepatitis C screen  01/14/2019 (Originally 1963)    HIV screen  01/14/2019 (Originally 2/4/1980)    Breast cancer screen  01/15/2019 (Originally 4/27/2017)    Flu vaccine (1) 01/15/2019 (Originally 9/1/2017)    Pneumococcal med risk (1 of 1 - PPSV23) 01/15/2019 (Originally 2/4/1984)    Cervical Report to [facility]   Pt AXOX4. VSS. Has ambulated . Has voided. Aj nausea . Tolerated juice /water. No RX. Spouse in waiting area.   cancer screen  01/22/2019 (Originally 5/14/2014)    Lipid screen  05/20/2021    Colon cancer screen colonoscopy  09/06/2021

## 2025-05-16 NOTE — DISCHARGE INSTRUCTIONS
HOME CARE INSTRUCTIONS    ACTIVITY: Rest and take it easy for the first 24 hours.  A responsible adult is recommended to remain with you during that time.  It is normal to feel sleepy.  We encourage you to not do anything that requires balance, judgment or coordination.    FOR 24 HOURS DO NOT:  Drive, operate machinery or run household appliances.  Drink beer or alcoholic beverages.  Make important decisions or sign legal documents.    SPECIAL INSTRUCTIONS:   1. Nothing in vagina (ie no tampons, douching, intercourse) for at least 2-3 weeks   2. No driving while taking narcotics   3. Return to our office as directed and call to confirm appointment   Call our office [phone] if you develop any fevers, chills, nausea/vomiting, heavy vaginal bleeding   4. You may have vaginal spotting or light bleeding which is normal. Call for heavy vaginal bleeding   5. If you have not had a bowel movement for 2 days, please take over the counter Milk of Magnesium, 1 tablespoon every 4 hours. After 4 doses and if you still have not had a bowel movement, please call your doctor.     DIET: To avoid nausea, slowly advance diet as tolerated, avoiding spicy or greasy foods for the first day.  Add more substantial food to your diet according to your physician's instructions.  Babies can be fed formula or breast milk as soon as they are hungry.  INCREASE FLUIDS AND FIBER TO AVOID CONSTIPATION.      MEDICATIONS: Resume taking daily medication.  Take prescribed pain medication with food.  If no medication is prescribed, you may take non-aspirin pain medication if needed.  PAIN MEDICATION CAN BE VERY CONSTIPATING.  Take a stool softener or laxative such as senokot, pericolace, or milk of magnesia if needed.    A follow-up appointment should be arranged with your doctor in one week; call to schedule.    You should CALL YOUR PHYSICIAN if you develop:  Fever greater than 101 degrees F.  Pain not relieved by medication, or persistent nausea or  vomiting.  Excessive bleeding (blood soaking through dressing) or unexpected drainage from the wound.  Extreme redness or swelling around the incision site, drainage of pus or foul smelling drainage.  Inability to urinate or empty your bladder within 8 hours.  Problems with breathing or chest pain.    You should call 911 if you develop problems with breathing or chest pain.  If you are unable to contact your doctor or surgical center, you should go to the nearest emergency room or urgent care center.  [facility] telephone #: [phone]     MILD FLU-LIKE SYMPTOMS ARE NORMAL.  YOU MAY EXPERIENCE GENERALIZED MUSCLE ACHES, THROAT IRRITATION, HEADACHE AND/OR SOME NAUSEA.    If any questions arise, call your doctor.  If your doctor is not available, please feel free to call the Surgical Center at [phone].  The Center is open Monday through Friday from 7AM to 7PM.      A registered nurse may call you a few days after your surgery to see how you are doing after your procedure.    You may also receive a survey in the mail within the next two weeks and we ask that you take a few moments to complete the survey and return it to us.  Our goal is to provide you with very good care and we value your comments.     Depression / Suicide Risk    As you are discharged from this [facility] facility, it is important to learn how to keep safe from harming yourself.    Recognize the warning signs:  Abrupt changes in personality, positive or negative- including increase in energy   Giving away possessions  Change in eating patterns- significant weight changes-  positive or negative  Change in sleeping patterns- unable to sleep or sleeping all the time   Unwillingness or inability to communicate  Depression  Unusual sadness, discouragement and loneliness  Talk of wanting to die  Neglect of personal appearance   Rebelliousness- reckless behavior  Withdrawal from people/activities they love  Confusion- inability to  concentrate     If you or a loved one observes any of these behaviors or has concerns about self-harm, here's what you can do:  Talk about it- your feelings and reasons for harming yourself  Remove any means that you might use to hurt yourself (examples: pills, rope, extension cords, firearm)  Get professional help from the community (Mental Health, Substance Abuse, psychological counseling)  Do not be alone:Call your Safe Contact- someone whom you trust who will be there for you.  Call your local CRISIS HOTLINE 948-5852 or 291-087-6755  Call your local Children's Mobile Crisis Response Team [place] (724) 589-6333 or www.PSafe  Call the toll free National Suicide Prevention Hotlines   National Suicide Prevention Lifeline 703-162-PCFO (4688)  National Hope Line Network 800-SUICIDE (138-2457)    I acknowledge receipt and understanding of these Home Care instructions.

## 2025-05-16 NOTE — ANESTHESIA TIME REPORT
Anesthesia Start and Stop Event Times       Date Time Event    5/16/2025 0926 Ready for Procedure     0926 Anesthesia Start     1016 Anesthesia Stop          Responsible Staff  05/16/25      Name Role Begin End    Sergio Swartz M.D. Anesth 0926 1016          Overtime Reason:  no overtime (within assigned shift)    Comments:

## 2025-05-16 NOTE — PROGRESS NOTES
Medication history reviewed with PT at bedside    [facility] is complete per PT reporting    Allergies reviewed.     Patient denies any outpatient antibiotics in the last 30 days.     Patient is not taking anticoagulants.    Dispense history is not available in [facility].    Preferred pharmacy for this visit - [facility] ([phone]). PT declined to use [facility]

## 2025-05-20 LAB
BARCODED ABORH UBTYP: 5100
BARCODED ABORH UBTYP: 5100
BARCODED PRD CODE UBPRD: NORMAL
BARCODED PRD CODE UBPRD: NORMAL
BARCODED UNIT NUM UBUNT: NORMAL
BARCODED UNIT NUM UBUNT: NORMAL
COMPONENT R 8504R: NORMAL
COMPONENT R 8504R: NORMAL
PRODUCT TYPE UPROD: NORMAL
PRODUCT TYPE UPROD: NORMAL
UNIT STATUS USTAT: NORMAL
UNIT STATUS USTAT: NORMAL

## 2025-06-16 NOTE — Clinical Note
REFERRAL APPROVAL NOTICE         Sent on June 16, 2025                   Brunilda Webber  [address] [place] [address]                   Dear Ms. Webber,    After a careful review of the medical information and benefit coverage, [facility] has processed your referral. See below for additional details.    If applicable, you must be actively enrolled with your insurance for coverage of the authorized service. If you have any questions regarding your coverage, please contact your insurance directly.    REFERRAL INFORMATION   Referral #:  [id number]  Referred-To Department    Referred-By Provider:  Urogynecology    Prachi Bryant M.D.   [facility]      [address]   [address]  [place] [address]  [phone] [address], [address]  [place] [id number] [phone]    Referral Start Date:  06/16/2025  Referral End Date:   06/16/2026             SCHEDULING  If you do not already have an appointment, please call [phone] to make an appointment.     MORE INFORMATION  If you do not already have a [employer] account, sign up at: [email or web address]  You can access your medical information, make appointments, see lab results, billing information, and more.  If you have questions regarding this referral, please contact  the [facility] Referrals department at:             [phone]. Monday - Friday 8:00AM - 5:00PM.     Sincerely,    [facility]

## 2025-08-12 ENCOUNTER — GYNECOLOGY VISIT (OUTPATIENT)
Dept: GYNECOLOGY | Facility: CLINIC | Age: 35
End: 2025-08-12
Payer: COMMERCIAL

## 2025-08-12 VITALS
SYSTOLIC BLOOD PRESSURE: 113 MMHG | BODY MASS INDEX: 32.58 KG/M2 | HEART RATE: 74 BPM | HEIGHT: 67 IN | WEIGHT: 207.6 LBS | DIASTOLIC BLOOD PRESSURE: 77 MMHG

## 2025-08-12 DIAGNOSIS — D06.9 ADENOCARCINOMA IN SITU (AIS) OF UTERINE CERVIX: Primary | ICD-10-CM

## 2025-08-12 PROCEDURE — 3078F DIAST BP <80 MM HG: CPT | Performed by: STUDENT IN AN ORGANIZED HEALTH CARE EDUCATION/TRAINING PROGRAM

## 2025-08-12 PROCEDURE — 99204 OFFICE O/P NEW MOD 45 MIN: CPT | Performed by: STUDENT IN AN ORGANIZED HEALTH CARE EDUCATION/TRAINING PROGRAM

## 2025-08-12 PROCEDURE — 3074F SYST BP LT 130 MM HG: CPT | Performed by: STUDENT IN AN ORGANIZED HEALTH CARE EDUCATION/TRAINING PROGRAM

## 2025-08-12 ASSESSMENT — FIBROSIS 4 INDEX: FIB4 SCORE: 0.96

## (undated) DEVICE — CLOSURE SKIN STRIP 1/2 X 4 IN - (STERI STRIP) (50/BX 4BX/CA)

## (undated) DEVICE — DRESSING INTERCEED ABSORBABLE ADHESION BARRIER TC7 (10EA/CA)

## (undated) DEVICE — SODIUM CHL IRRIGATION 0.9% 1000ML (12EA/CA)

## (undated) DEVICE — CANISTER SUCTION 3000ML MECHANICAL FILTER AUTO SHUTOFF MEDI-VAC NONSTERILE LF DISP (40EA/CA)

## (undated) DEVICE — SUTURE GENERAL

## (undated) DEVICE — DRESSING NON-ADHERING 8 X 3 - (50/BX)

## (undated) DEVICE — DETERGENT RENUZYME PLUS 10 OZ PACKET (50/BX)

## (undated) DEVICE — ELECTRODE DUAL RETURN W/ CORD - (50/PK)

## (undated) DEVICE — TUBE E-T HI-LO CUFF 6.5MM (10EA/BX)

## (undated) DEVICE — TRAY SPINAL ANESTHESIA NON-SAFETY (10/CA)

## (undated) DEVICE — GOWN WARMING STANDARD FLEX - (30/CA)

## (undated) DEVICE — SET LEADWIRE 5 LEAD BEDSIDE DISPOSABLE ECG (1SET OF 5/EA)

## (undated) DEVICE — GLOVE SZ 8 BIOGEL PI MICRO - PF LF (50PR/BX)

## (undated) DEVICE — GLOVE BIOGEL SZ 8 SURGICAL PF LTX - (50PR/BX 4BX/CA)

## (undated) DEVICE — ADHESIVE MASTISOL - (48/BX)

## (undated) DEVICE — GLOVE SZ 6.5 BIOGEL PI MICRO - PF LF (50PR/BX)

## (undated) DEVICE — TRAY FOLEY CATHETER STATLOCK 16FR SURESTEP  (10EA/CA)

## (undated) DEVICE — TOWEL STOP TIMEOUT SAFETY FLAG (40EA/CA)

## (undated) DEVICE — LACTATED RINGERS INJ 1000 ML - (14EA/CA 60CA/PF)

## (undated) DEVICE — BANDAID SHEER STRIP 3/4 IN (100EA/BX 12BX/CA)

## (undated) DEVICE — SODIUM CHL. IRRIGATION 0.9% 3000ML (4EA/CA 65CA/PF)

## (undated) DEVICE — SUTURE 1 CHROMIC GUTCT-1 27 (36PK/BX)"

## (undated) DEVICE — CANNULA O2 COMFORT SOFT EAR ADULT 7 FT TUBING (50/CA)

## (undated) DEVICE — CANNULA W/SEAL 5X100 Z-THRE - ADED KII (12/BX)

## (undated) DEVICE — GLOVE BIOGEL INDICATOR SZ 8 SURGICAL PF LTX - (50/BX 4BX/CA)

## (undated) DEVICE — DRAPE LAPAROTOMY T SHEET - (12EA/CA)

## (undated) DEVICE — SET SUCTION/IRRIGATION WITH DISPOSABLE TIP (6/CA )PART #0250-070-520 IS A SUB

## (undated) DEVICE — TUBING CLEARLINK DUO-VENT - C-FLO (48EA/CA)

## (undated) DEVICE — SYRINGE 10 ML CONTROL LL (25EA/BX 4BX/CA)

## (undated) DEVICE — COVER LIGHT HANDLE ALC PLUS DISP (18EA/BX)

## (undated) DEVICE — SOD. CHL. INJ. 0.9% 250 ML - (36/CA 50CA/PF)

## (undated) DEVICE — TROCAR Z THREAD 11 X 100 - BLADED (6/BX)

## (undated) DEVICE — KIT  I.V. START (100EA/CA)

## (undated) DEVICE — SUTURE 0 VICRYL PLUS UR-6 - 27 INCH (36/BX)

## (undated) DEVICE — TOWELS CLOTH SURGICAL - (4/PK 20PK/CA)

## (undated) DEVICE — NEEDLE INSFL 120MM 14GA VRRS - (20/BX)

## (undated) DEVICE — WATER IRRIGATION STERILE 1000ML (12EA/CA)

## (undated) DEVICE — KIT CANISTER AQUILEX FLUID CONTROL SYSTEM X (36EA/BX)

## (undated) DEVICE — TROCAR Z THREAD 12 X 100 - BLADED (6/BX)

## (undated) DEVICE — GOWN SURGICAL XX-LARGE - (28EA/CA) SIRUS NON REINFORCED

## (undated) DEVICE — SET TUBING AQUILEX IN-FLOW MYOSURE (10EA/BX)

## (undated) DEVICE — PACK LAPAROSCOPY - (1/CA)

## (undated) DEVICE — TUBE CONNECTING SUCTION - CLEAR PLASTIC STERILE 72 IN (50EA/CA)

## (undated) DEVICE — SUTURE 0 36 CHROMIC GUT CTX (36PK/BX)

## (undated) DEVICE — SET EXTENSION WITH 2 PORTS (48EA/CA) ***PART #2C8610 IS A SUBSTITUTE*****

## (undated) DEVICE — SUTURE 0 VICRYL PLUS CT-1 - 36 INCH (36/BX)

## (undated) DEVICE — STERI STRIP COMPOUND BENZOIN - TINCTURE 0.6ML WITH APPLICATOR (40EA/BX)

## (undated) DEVICE — DRAPE UNDER BUTTOCKS FLUID - (20/CA)

## (undated) DEVICE — BRIEF STRETCH MATERNITY M/L - FITS 20-60IN (5EA/BG 20BG/CA)

## (undated) DEVICE — SENSOR OXIMETER ADULT SPO2 RD SET (20EA/BX)

## (undated) DEVICE — Device

## (undated) DEVICE — STAPLER SKIN DISP - (6/BX 10BX/CA) VISISTAT

## (undated) DEVICE — TROCAR STEP 11MM - (3/CA)

## (undated) DEVICE — PACK C-SECTION (2EA/CA)

## (undated) DEVICE — GLOVE BIOGEL PI INDICATOR SZ 6.0 SURGICAL PF LF -(200PR/CA)

## (undated) DEVICE — BAG RETRIEVAL 10ML (10EA/BX)

## (undated) DEVICE — CANISTER SUCTION 3000ML MECHANICAL FILTER AUTO SHUTOFF MEDI-VAC NONSTERILE LF DISP  (40EA/CA)

## (undated) DEVICE — SUTURE 0 VICRYL PLUS CT-2 - 27 INCH (36/BX)

## (undated) DEVICE — BANDAID X-LARGE 2 X 4 IN LF (50EA/BX)

## (undated) DEVICE — GOWN SURGEONS X-LARGE - DISP. (30/CA)

## (undated) DEVICE — SLEEVE VASO DVT COMPRESSION CALF MED - (10PR/CA)

## (undated) DEVICE — MANIFOLD NEPTUNE 1 PORT (20/PK)

## (undated) DEVICE — SLEEVE, VASO, THIGH, MED

## (undated) DEVICE — MASK OXYGEN VNYL ADLT MED CONC WITH 7 FOOT TUBING  - (50EA/CA)

## (undated) DEVICE — SYSTEM CLEARIFY VISUALIZATION (10EA/PK)

## (undated) DEVICE — GLOVE BIOGEL INDICATOR SZ 7.5 SURGICAL PF LTX - (50PR/BX 4BX/CA)

## (undated) DEVICE — PAD SANITARY 11IN MAXI IND WRAPPED  (12EA/PK 24PK/CA)

## (undated) DEVICE — GLOVE BIOGEL INDICATOR SZ 7SURGICAL PF LTX - (50/BX 4BX/CA)

## (undated) DEVICE — DRAPE STRLE REG TOWEL 18X24 - (10/BX 4BX/CA)"

## (undated) DEVICE — PACK MAJOR BASIN - (2EA/CA)

## (undated) DEVICE — PAD SANITARY 11IN MAXI IND WRAPPED (12EA/PK 24PK/CA)

## (undated) DEVICE — SET TUBING AQUILEX OUT-FLOW MYOSURE (10EA/BX)

## (undated) DEVICE — SUTURE 3-0 VICRYL PLUS CT-1 - 36 INCH (36/BX)

## (undated) DEVICE — CATHETER IV 20 GA X 1-1/4 ---SURG.& SDS ONLY--- (50EA/BX)

## (undated) DEVICE — BOVIE  BLADE 6 EXTENDED - (50/PK)

## (undated) DEVICE — SUTURE 2-0 VICRYL PLUS CT-2 - 27 INCH (36/BX)

## (undated) DEVICE — ELECTRODE MONOPOLAR ANGLED CUTTING LOOP DIAM 0.35 YELLOW 24FR (6EA/PK)

## (undated) DEVICE — CHLORAPREP 26 ML APPLICATOR - ORANGE TINT(25/CA)

## (undated) DEVICE — CATHETER IV NON-SAFETY 18 GA X 1 1/4 (50/BX 4BX/CA)

## (undated) DEVICE — SURGIFOAM (12X7) - (12EA/CA)

## (undated) DEVICE — HEAD HOLDER JUNIOR/ADULT

## (undated) DEVICE — SPONGE GAUZESTER 4 X 4 4PLY - (128PK/CA)

## (undated) DEVICE — GLOVE BIOGEL SZ 6 PF LATEX - (50EA/BX 4BX/CA)

## (undated) DEVICE — BLADE SURGICAL #15 - (50/BX 3BX/CA)

## (undated) DEVICE — SODIUM CHL. INJ. 0.9% 500ML (24EA/CA 50CA/PF)

## (undated) DEVICE — SLEEVE VASO CALF MED - (10PR/CA)

## (undated) DEVICE — LIGASURE 5MM BLUNT TIP LONG - 44CM (6EA/PK)

## (undated) DEVICE — GLOVE SZ 6 BIOGEL PI MICRO - PF LF (50PR/BX 4BX/CA)

## (undated) DEVICE — SUTURE 4-0 MONOCRYL PLUS PS-2 - 27 INCH (36/BX)

## (undated) DEVICE — COVER MAYO STAND X-LG - (22EA/CA)

## (undated) DEVICE — GLOVE BIOGEL SZ 7.5 SURGICAL PF LTX - (50PR/BX 4BX/CA)

## (undated) DEVICE — DRESSING TRANSPARENT FILM TEGADERM 2.375 X 2.75"  (100EA/BX)"

## (undated) DEVICE — GLOVE BIOGEL SZ 6.5 SURGICAL PF LTX (50PR/BX 4BX/CA)

## (undated) DEVICE — SUTURE 0 CHROMIC CT-1 - (36/BX)

## (undated) DEVICE — TROCAR5X55 KII SHIELDED SYS - (6/BX)

## (undated) DEVICE — GLOVE BIOGEL ECLIPSE PF LATEX SIZE 7.5

## (undated) DEVICE — SUTUREABS02-0 CT1 27IN - (36EA/BX)

## (undated) DEVICE — TRAY CATHETER FOLEY URINE METER W/STATLOCK 350ML (10EA/CA)

## (undated) DEVICE — DRESSING NON ADHERENT 3 X 4 - STERILE (100/BX 12BX/CA)

## (undated) DEVICE — NEEDLE NON SAFETY HYPO 22 GA X 1 1/2 IN (100/BX)

## (undated) DEVICE — TROCAR LAPSCP 100MM 12MM NTHRD - (6/BX)

## (undated) DEVICE — SUCTION INSTRUMENT YANKAUER BULBOUS TIP W/O VENT (50EA/CA)

## (undated) DEVICE — ARMREST CRADLE FOAM - (2PR/PK 12PR/CA)

## (undated) DEVICE — SUTURE 4-0 VICRYL PLUS FS-2 - 27 INCH (36/BX)

## (undated) DEVICE — CANISTER SUCTION RIGID RED 1500CC (40EA/CA)

## (undated) DEVICE — SPONGE GAUZESTER. 2X2 4-PL - (2/PK 50PK/BX 30BX/CS)

## (undated) DEVICE — NEEDLE INSUFFLATION FOR STEP - (12/BX)

## (undated) DEVICE — TRAY SRGPRP PVP IOD WT PRP - (20/CA)

## (undated) DEVICE — GLOVE SIZE 6.5  SURGEON ACCELERATOR FREE GREEN (50PR/BX)

## (undated) DEVICE — DRAPESURG STERI-DRAPE LONG - (10/BX 4BX/CA)

## (undated) DEVICE — ELECTRODE 5MM LHK LAPSCP STERILE DISP- MEGADYNE  (5/CA)